# Patient Record
Sex: MALE | Race: WHITE | NOT HISPANIC OR LATINO | URBAN - METROPOLITAN AREA
[De-identification: names, ages, dates, MRNs, and addresses within clinical notes are randomized per-mention and may not be internally consistent; named-entity substitution may affect disease eponyms.]

---

## 2023-03-06 ENCOUNTER — DOCUMENTATION (OUTPATIENT)
Dept: PRIMARY CARE | Facility: CLINIC | Age: 78
End: 2023-03-06

## 2023-03-06 DIAGNOSIS — J01.01 ACUTE RECURRENT MAXILLARY SINUSITIS: Primary | ICD-10-CM

## 2023-03-06 RX ORDER — AZITHROMYCIN 250 MG/1
TABLET, FILM COATED ORAL
Qty: 6 TABLET | Refills: 0 | OUTPATIENT
Start: 2023-03-06 | End: 2023-03-11

## 2023-03-06 NOTE — PROGRESS NOTES
Subjective   Reason for Visit: Travis Hunt is an 77 y.o. male here for a Medicare Wellness visit.               HPI                        Patient Care Team:  Yunior Nava DO as PCP - General     Review of Systems    Objective   Vitals:  There were no vitals taken for this visit.      Physical Exam    Assessment/Plan   Problem List Items Addressed This Visit    None

## 2023-04-03 DIAGNOSIS — R21 RASH: ICD-10-CM

## 2023-04-03 DIAGNOSIS — L29.9 ITCHING: ICD-10-CM

## 2023-04-04 DIAGNOSIS — L29.9 ITCHING: ICD-10-CM

## 2023-04-04 DIAGNOSIS — R21 RASH: ICD-10-CM

## 2023-04-04 RX ORDER — CLOTRIMAZOLE AND BETAMETHASONE DIPROPIONATE 10; .5 MG/ML; MG/ML
LOTION TOPICAL 2 TIMES DAILY
COMMUNITY
Start: 2015-02-06 | End: 2023-04-04 | Stop reason: SDUPTHER

## 2023-04-04 RX ORDER — FLUCONAZOLE 100 MG/1
100 TABLET ORAL DAILY
Qty: 14 TABLET | Refills: 0 | Status: SHIPPED | OUTPATIENT
Start: 2023-04-04 | End: 2023-04-18

## 2023-04-04 RX ORDER — ASPIRIN 81 MG/1
1 TABLET ORAL DAILY
COMMUNITY
Start: 2022-05-11

## 2023-04-04 RX ORDER — METHYLPREDNISOLONE 4 MG/1
TABLET ORAL
COMMUNITY
Start: 2022-04-29

## 2023-04-04 RX ORDER — CLOTRIMAZOLE AND BETAMETHASONE DIPROPIONATE 10; .5 MG/ML; MG/ML
LOTION TOPICAL EVERY 2 HOUR PRN
Qty: 120 ML | Refills: 3 | Status: SHIPPED | OUTPATIENT
Start: 2023-04-04 | End: 2023-12-19 | Stop reason: SDUPTHER

## 2023-04-04 RX ORDER — TAMSULOSIN HYDROCHLORIDE 0.4 MG/1
1 CAPSULE ORAL DAILY
COMMUNITY
Start: 2023-02-07

## 2023-04-04 RX ORDER — CICLOPIROX 80 MG/ML
SOLUTION TOPICAL
COMMUNITY
Start: 2023-02-16

## 2023-04-04 RX ORDER — METAXALONE 800 MG/1
1 TABLET ORAL DAILY
COMMUNITY
Start: 2018-03-14 | End: 2023-04-17

## 2023-04-04 RX ORDER — ATORVASTATIN CALCIUM 40 MG/1
1 TABLET, FILM COATED ORAL DAILY
COMMUNITY
Start: 2023-01-28

## 2023-04-04 RX ORDER — GABAPENTIN 300 MG/1
1 CAPSULE ORAL
COMMUNITY
Start: 2022-05-20

## 2023-04-04 RX ORDER — NITROGLYCERIN 0.4 MG/1
TABLET SUBLINGUAL
COMMUNITY
Start: 2022-05-16

## 2023-04-10 DIAGNOSIS — F32.1 CURRENT MODERATE EPISODE OF MAJOR DEPRESSIVE DISORDER, UNSPECIFIED WHETHER RECURRENT (MULTI): ICD-10-CM

## 2023-04-10 DIAGNOSIS — F34.1 PERSISTENT DEPRESSIVE DISORDER: ICD-10-CM

## 2023-04-10 DIAGNOSIS — E66.01 CLASS 3 SEVERE OBESITY WITH SERIOUS COMORBIDITY AND BODY MASS INDEX (BMI) OF 40.0 TO 44.9 IN ADULT, UNSPECIFIED OBESITY TYPE (MULTI): Primary | ICD-10-CM

## 2023-04-10 DIAGNOSIS — E66.09 OBESITY DUE TO EXCESS CALORIES WITH SERIOUS COMORBIDITY, UNSPECIFIED CLASSIFICATION: ICD-10-CM

## 2023-04-17 DIAGNOSIS — M62.838 MUSCLE SPASM: Primary | ICD-10-CM

## 2023-04-17 RX ORDER — METAXALONE 800 MG/1
TABLET ORAL
Qty: 90 TABLET | Refills: 3 | Status: SHIPPED | OUTPATIENT
Start: 2023-04-17 | End: 2024-03-15

## 2023-07-27 DIAGNOSIS — Z74.09 LIMITED MOBILITY: ICD-10-CM

## 2023-07-27 DIAGNOSIS — M76.891 TENDINITIS OF RIGHT KNEE: ICD-10-CM

## 2023-07-27 DIAGNOSIS — E66.01 CLASS 3 SEVERE OBESITY DUE TO EXCESS CALORIES WITH SERIOUS COMORBIDITY AND BODY MASS INDEX (BMI) OF 40.0 TO 44.9 IN ADULT (MULTI): ICD-10-CM

## 2023-07-27 DIAGNOSIS — G89.29 CHRONIC THORACIC BACK PAIN, UNSPECIFIED BACK PAIN LATERALITY: ICD-10-CM

## 2023-07-27 DIAGNOSIS — M54.6 CHRONIC THORACIC BACK PAIN, UNSPECIFIED BACK PAIN LATERALITY: ICD-10-CM

## 2023-10-26 DIAGNOSIS — I27.82 CHRONIC PULMONARY EMBOLISM WITHOUT ACUTE COR PULMONALE, UNSPECIFIED PULMONARY EMBOLISM TYPE (MULTI): Primary | ICD-10-CM

## 2023-10-26 RX ORDER — WARFARIN SODIUM 5 MG/1
TABLET ORAL
Qty: 90 TABLET | Refills: 3 | Status: SHIPPED | OUTPATIENT
Start: 2023-10-26

## 2023-12-19 DIAGNOSIS — L29.9 ITCHING: ICD-10-CM

## 2023-12-19 DIAGNOSIS — B37.9 YEAST INFECTION: Primary | ICD-10-CM

## 2023-12-19 DIAGNOSIS — R21 RASH: ICD-10-CM

## 2023-12-19 RX ORDER — CLOTRIMAZOLE AND BETAMETHASONE DIPROPIONATE 10; .5 MG/ML; MG/ML
LOTION TOPICAL EVERY 2 HOUR PRN
Qty: 150 ML | Refills: 3 | Status: SHIPPED | OUTPATIENT
Start: 2023-12-19

## 2023-12-20 ENCOUNTER — TELEPHONE (OUTPATIENT)
Dept: PRIMARY CARE | Facility: CLINIC | Age: 78
End: 2023-12-20

## 2023-12-21 DIAGNOSIS — R42 VERTIGO: Primary | ICD-10-CM

## 2023-12-22 RX ORDER — MECLIZINE HYDROCHLORIDE 25 MG/1
25 TABLET ORAL 3 TIMES DAILY PRN
Qty: 90 TABLET | Refills: 3 | Status: SHIPPED | OUTPATIENT
Start: 2023-12-22

## 2023-12-29 DIAGNOSIS — M10.9 GOUTY ARTHRITIS: Primary | ICD-10-CM

## 2023-12-29 RX ORDER — ALLOPURINOL 100 MG/1
100 TABLET ORAL 2 TIMES DAILY
Qty: 180 TABLET | Refills: 3 | Status: SHIPPED | OUTPATIENT
Start: 2023-12-29

## 2024-01-01 ENCOUNTER — APPOINTMENT (OUTPATIENT)
Dept: RADIOLOGY | Facility: HOSPITAL | Age: 79
End: 2024-01-01
Payer: MEDICARE

## 2024-01-01 ENCOUNTER — APPOINTMENT (OUTPATIENT)
Dept: NEUROLOGY | Facility: HOSPITAL | Age: 79
End: 2024-01-01
Payer: MEDICARE

## 2024-01-01 ENCOUNTER — APPOINTMENT (OUTPATIENT)
Dept: CARDIOLOGY | Facility: HOSPITAL | Age: 79
End: 2024-01-01
Payer: MEDICARE

## 2024-01-01 PROCEDURE — 70450 CT HEAD/BRAIN W/O DYE: CPT

## 2024-01-01 PROCEDURE — 71045 X-RAY EXAM CHEST 1 VIEW: CPT

## 2024-01-01 PROCEDURE — 93005 ELECTROCARDIOGRAM TRACING: CPT

## 2024-01-01 PROCEDURE — 74018 RADEX ABDOMEN 1 VIEW: CPT

## 2024-01-01 PROCEDURE — 70553 MRI BRAIN STEM W/O & W/DYE: CPT

## 2024-01-01 PROCEDURE — 70545 MR ANGIOGRAPHY HEAD W/DYE: CPT

## 2024-01-01 PROCEDURE — 72125 CT NECK SPINE W/O DYE: CPT

## 2024-02-12 DIAGNOSIS — M41.9 SCOLIOSIS OF THORACOLUMBAR SPINE, UNSPECIFIED SCOLIOSIS TYPE: ICD-10-CM

## 2024-02-12 DIAGNOSIS — G89.29 CHRONIC BILATERAL THORACIC BACK PAIN: ICD-10-CM

## 2024-02-12 DIAGNOSIS — E11.42 DIABETIC PERIPHERAL NEUROPATHY ASSOCIATED WITH TYPE 2 DIABETES MELLITUS (MULTI): Primary | ICD-10-CM

## 2024-02-12 DIAGNOSIS — M54.6 CHRONIC BILATERAL THORACIC BACK PAIN: ICD-10-CM

## 2024-03-12 DIAGNOSIS — M62.838 MUSCLE SPASM: ICD-10-CM

## 2024-03-15 RX ORDER — METAXALONE 800 MG/1
TABLET ORAL
Qty: 90 TABLET | Refills: 3 | Status: SHIPPED | OUTPATIENT
Start: 2024-03-15

## 2024-04-17 DIAGNOSIS — M54.17 LUMBOSACRAL RADICULOPATHY DUE TO INTERVERTEBRAL DISC DISORDER: Primary | ICD-10-CM

## 2024-04-17 RX ORDER — LIDOCAINE 50 MG/G
1 PATCH TOPICAL DAILY
Qty: 30 PATCH | Refills: 3 | Status: SHIPPED | OUTPATIENT
Start: 2024-04-17 | End: 2024-04-18

## 2024-04-18 ENCOUNTER — DOCUMENTATION (OUTPATIENT)
Dept: PRIMARY CARE | Facility: CLINIC | Age: 79
End: 2024-04-18

## 2024-04-18 DIAGNOSIS — M54.17 LUMBOSACRAL RADICULOPATHY DUE TO INTERVERTEBRAL DISC DISORDER: ICD-10-CM

## 2024-04-18 RX ORDER — LIDOCAINE 50 MG/G
1 PATCH TOPICAL DAILY
Qty: 30 PATCH | Refills: 3 | Status: SHIPPED | OUTPATIENT
Start: 2024-04-18 | End: 2025-04-18

## 2024-04-18 NOTE — PROGRESS NOTES
Prior authorization for Lidocaine patches submitted through [facility] today at 8:00 AM    Robin Brown MA

## 2024-05-31 ENCOUNTER — DOCUMENTATION (OUTPATIENT)
Dept: PRIMARY CARE | Facility: CLINIC | Age: 79
End: 2024-05-31

## 2024-07-17 DIAGNOSIS — J06.9 VIRAL UPPER RESPIRATORY TRACT INFECTION: ICD-10-CM

## 2024-07-17 RX ORDER — METHYLPREDNISOLONE 4 MG/1
TABLET ORAL
Qty: 21 TABLET | Refills: 0 | Status: SHIPPED | OUTPATIENT
Start: 2024-07-17 | End: 2024-07-24

## 2024-08-23 ENCOUNTER — TELEPHONE (OUTPATIENT)
Dept: PRIMARY CARE | Facility: CLINIC | Age: 79
End: 2024-08-23
Payer: MEDICARE

## 2024-08-23 NOTE — TELEPHONE ENCOUNTER
Dr. Nam Cast- pain man- [facility] [phone]    Asking if you can call him Monday to discuss mutual patient

## 2024-08-24 ENCOUNTER — HOSPITAL ENCOUNTER (INPATIENT)
Facility: HOSPITAL | Age: 79
End: 2024-08-24
Attending: STUDENT IN AN ORGANIZED HEALTH CARE EDUCATION/TRAINING PROGRAM | Admitting: STUDENT IN AN ORGANIZED HEALTH CARE EDUCATION/TRAINING PROGRAM
Payer: MEDICARE

## 2024-08-24 ENCOUNTER — ANESTHESIA EVENT (OUTPATIENT)
Dept: OPERATING ROOM | Facility: HOSPITAL | Age: 79
End: 2024-08-24
Payer: MEDICARE

## 2024-08-24 ENCOUNTER — ANESTHESIA (OUTPATIENT)
Dept: OPERATING ROOM | Facility: HOSPITAL | Age: 79
End: 2024-08-24
Payer: MEDICARE

## 2024-08-24 DIAGNOSIS — J96.00 ACUTE RESPIRATORY FAILURE, UNSPECIFIED WHETHER WITH HYPOXIA OR HYPERCAPNIA: ICD-10-CM

## 2024-08-24 DIAGNOSIS — I60.4: ICD-10-CM

## 2024-08-24 DIAGNOSIS — S06.5XAA SUBDURAL HEMATOMA (MULTI): Primary | ICD-10-CM

## 2024-08-24 PROBLEM — Z86.718 HISTORY OF DVT (DEEP VEIN THROMBOSIS): Status: ACTIVE | Noted: 2022-08-03

## 2024-08-24 PROBLEM — E78.5 HYPERLIPIDEMIA: Status: ACTIVE | Noted: 2022-04-26

## 2024-08-24 PROBLEM — K58.9 IRRITABLE BOWEL SYNDROME: Status: ACTIVE | Noted: 2024-08-24

## 2024-08-24 PROBLEM — Z79.01 ANTICOAGULANT LONG-TERM USE: Status: ACTIVE | Noted: 2024-08-24

## 2024-08-24 PROBLEM — Z86.711 HISTORY OF PULMONARY EMBOLISM: Status: ACTIVE | Noted: 2022-08-03

## 2024-08-24 PROBLEM — I25.10 CORONARY ARTERIOSCLEROSIS: Status: ACTIVE | Noted: 2022-04-26

## 2024-08-24 PROBLEM — Z95.5 STENTED CORONARY ARTERY: Status: ACTIVE | Noted: 2024-08-24

## 2024-08-24 PROBLEM — I82.409 DVT (DEEP VENOUS THROMBOSIS) (MULTI): Status: ACTIVE | Noted: 2024-08-24

## 2024-08-24 PROBLEM — Z85.528 HISTORY OF RENAL CELL CARCINOMA: Status: ACTIVE | Noted: 2024-08-24

## 2024-08-24 PROBLEM — I26.99 PULMONARY EMBOLUS (MULTI): Status: ACTIVE | Noted: 2024-08-24

## 2024-08-24 LAB
ABO GROUP (TYPE) IN BLOOD: NORMAL
ALBUMIN SERPL BCP-MCNC: 3.2 G/DL (ref 3.4–5)
ALP SERPL-CCNC: 54 U/L (ref 33–136)
ALT SERPL W P-5'-P-CCNC: 12 U/L (ref 10–52)
ANION GAP BLDA CALCULATED.4IONS-SCNC: 10 MMO/L (ref 10–25)
ANION GAP BLDV CALCULATED.4IONS-SCNC: 9 MMOL/L (ref 10–25)
ANION GAP SERPL CALC-SCNC: 15 MMOL/L (ref 10–20)
ANION GAP SERPL CALC-SCNC: 15 MMOL/L (ref 10–20)
ANTIBODY SCREEN: NORMAL
ANTIBODY SCREEN: NORMAL
AST SERPL W P-5'-P-CCNC: 32 U/L (ref 9–39)
BASE EXCESS BLDA CALC-SCNC: -6 MMOL/L (ref -2–3)
BASE EXCESS BLDV CALC-SCNC: -3.5 MMOL/L (ref -2–3)
BILIRUB SERPL-MCNC: 0.6 MG/DL (ref 0–1.2)
BLOOD EXPIRATION DATE: NORMAL
BODY TEMPERATURE: 37 DEGREES CELSIUS
BODY TEMPERATURE: 37 DEGREES CELSIUS
BUN SERPL-MCNC: 31 MG/DL (ref 6–23)
BUN SERPL-MCNC: 31 MG/DL (ref 6–23)
CA-I BLDA-SCNC: 1.09 MMOL/L (ref 1.1–1.33)
CA-I BLDV-SCNC: 1.18 MMOL/L (ref 1.1–1.33)
CALCIUM SERPL-MCNC: 8.2 MG/DL (ref 8.6–10.6)
CALCIUM SERPL-MCNC: 8.7 MG/DL (ref 8.6–10.6)
CFT FORM KAOLIN IND BLD RES TEG: 0.8 MIN (ref 0.8–2.1)
CHLORIDE BLDA-SCNC: 114 MMOL/L (ref 98–107)
CHLORIDE BLDV-SCNC: 114 MMOL/L (ref 98–107)
CHLORIDE SERPL-SCNC: 109 MMOL/L (ref 98–107)
CHLORIDE SERPL-SCNC: 111 MMOL/L (ref 98–107)
CLOT ANGLE.KAOLIN INDUCED BLD RES TEG: 79 DEG (ref 63–78)
CLOT INIT KAO IND P HEP NEUT BLD RES TEG: 4.8 MIN (ref 4.3–8.3)
CLOT INIT KAO IND P HEP NEUT BLD RES TEG: 5.1 MIN (ref 4.6–9.1)
CO2 SERPL-SCNC: 20 MMOL/L (ref 21–32)
CO2 SERPL-SCNC: 22 MMOL/L (ref 21–32)
CREAT SERPL-MCNC: 1.59 MG/DL (ref 0.5–1.3)
CREAT SERPL-MCNC: 1.63 MG/DL (ref 0.5–1.3)
DISPENSE STATUS: NORMAL
EGFRCR SERPLBLD CKD-EPI 2021: 43 ML/MIN/1.73M*2
EGFRCR SERPLBLD CKD-EPI 2021: 45 ML/MIN/1.73M*2
ERYTHROCYTE [DISTWIDTH] IN BLOOD BY AUTOMATED COUNT: 18.6 % (ref 11.5–14.5)
ETHANOL SERPL-MCNC: <10 MG/DL
FIBRINOGEN BLD CALC-MCNC: 766 MG/DL (ref 278–581)
GLUCOSE BLDA-MCNC: 173 MG/DL (ref 74–99)
GLUCOSE BLDV-MCNC: 137 MG/DL (ref 74–99)
GLUCOSE SERPL-MCNC: 129 MG/DL (ref 74–99)
GLUCOSE SERPL-MCNC: 141 MG/DL (ref 74–99)
HCO3 BLDA-SCNC: 20.7 MMOL/L (ref 22–26)
HCO3 BLDV-SCNC: 23.1 MMOL/L (ref 22–26)
HCT VFR BLD AUTO: 23.7 % (ref 41–52)
HCT VFR BLD EST: 27 % (ref 41–52)
HCT VFR BLD EST: ABNORMAL %
HGB BLD-MCNC: 7.9 G/DL (ref 13.5–17.5)
HGB BLDA-MCNC: ABNORMAL G/DL
HGB BLDV-MCNC: 9.1 G/DL (ref 13.5–17.5)
INHALED O2 CONCENTRATION: 60 %
LACTATE BLDA-SCNC: 1.3 MMOL/L (ref 0.4–2)
LACTATE BLDV-SCNC: 1.6 MMOL/L (ref 0.4–2)
LACTATE SERPL-SCNC: 1.7 MMOL/L (ref 0.4–2)
MA KAOLIN BLD RES TEG: 68 MM (ref 52–69)
MA KAOLIN+TF BLD RES TEG: 68 MM (ref 52–70)
MA TF IND+IIB-IIIA INH BLD RES TEG: 42 MM (ref 15–32)
MAGNESIUM SERPL-MCNC: 1.81 MG/DL (ref 1.6–2.4)
MCH RBC QN AUTO: 32.6 PG (ref 26–34)
MCHC RBC AUTO-ENTMCNC: 33.3 G/DL (ref 32–36)
MCV RBC AUTO: 98 FL (ref 80–100)
NRBC BLD-RTO: 0 /100 WBCS (ref 0–0)
OXYHGB MFR BLDA: ABNORMAL %
OXYHGB MFR BLDV: 54.6 % (ref 45–75)
PCO2 BLDA: 44 MM HG (ref 38–42)
PCO2 BLDV: 48 MM HG (ref 41–51)
PH BLDA: 7.28 PH (ref 7.38–7.42)
PH BLDV: 7.29 PH (ref 7.33–7.43)
PHOSPHATE SERPL-MCNC: 2.7 MG/DL (ref 2.5–4.9)
PHOSPHATE SERPL-MCNC: 2.7 MG/DL (ref 2.5–4.9)
PLATELET # BLD AUTO: 145 X10*3/UL (ref 150–450)
PO2 BLDA: 188 MM HG (ref 85–95)
PO2 BLDV: 37 MM HG (ref 35–45)
POTASSIUM BLDA-SCNC: 4.5 MMOL/L (ref 3.5–5.3)
POTASSIUM BLDV-SCNC: 5.2 MMOL/L (ref 3.5–5.3)
POTASSIUM SERPL-SCNC: 4.3 MMOL/L (ref 3.5–5.3)
POTASSIUM SERPL-SCNC: 4.8 MMOL/L (ref 3.5–5.3)
PRODUCT BLOOD TYPE: 5100
PRODUCT BLOOD TYPE: 5100
PRODUCT BLOOD TYPE: 8400
PRODUCT CODE: NORMAL
PROT SERPL-MCNC: 6.2 G/DL (ref 6.4–8.2)
RBC # BLD AUTO: 2.42 X10*6/UL (ref 4.5–5.9)
RH FACTOR (ANTIGEN D): NORMAL
SAO2 % BLDA: ABNORMAL %
SAO2 % BLDV: 56 % (ref 45–75)
SODIUM BLDA-SCNC: 140 MMOL/L (ref 136–145)
SODIUM BLDV-SCNC: 141 MMOL/L (ref 136–145)
SODIUM SERPL-SCNC: 141 MMOL/L (ref 136–145)
SODIUM SERPL-SCNC: 142 MMOL/L (ref 136–145)
UNIT ABO: NORMAL
UNIT NUMBER: NORMAL
UNIT RH: NORMAL
UNIT VOLUME: 277
UNIT VOLUME: 281
UNIT VOLUME: 283
WBC # BLD AUTO: 11.7 X10*3/UL (ref 4.4–11.3)

## 2024-08-24 PROCEDURE — 86901 BLOOD TYPING SEROLOGIC RH(D): CPT | Performed by: STUDENT IN AN ORGANIZED HEALTH CARE EDUCATION/TRAINING PROGRAM

## 2024-08-24 PROCEDURE — 99291 CRITICAL CARE FIRST HOUR: CPT | Performed by: EMERGENCY MEDICINE

## 2024-08-24 PROCEDURE — 86900 BLOOD TYPING SEROLOGIC ABO: CPT

## 2024-08-24 PROCEDURE — 85027 COMPLETE CBC AUTOMATED: CPT

## 2024-08-24 PROCEDURE — 84132 ASSAY OF SERUM POTASSIUM: CPT

## 2024-08-24 PROCEDURE — 2720000007 HC OR 272 NO HCPCS: Performed by: NEUROLOGICAL SURGERY

## 2024-08-24 PROCEDURE — 82077 ASSAY SPEC XCP UR&BREATH IA: CPT | Performed by: NURSE PRACTITIONER

## 2024-08-24 PROCEDURE — 80053 COMPREHEN METABOLIC PANEL: CPT | Performed by: NURSE PRACTITIONER

## 2024-08-24 PROCEDURE — 61312 CRNEC/CRNOT STTL XDRL/SDRL: CPT | Performed by: NEUROLOGICAL SURGERY

## 2024-08-24 PROCEDURE — 36600 WITHDRAWAL OF ARTERIAL BLOOD: CPT | Performed by: NURSE PRACTITIONER

## 2024-08-24 PROCEDURE — P9037 PLATE PHERES LEUKOREDU IRRAD: HCPCS

## 2024-08-24 PROCEDURE — C1713 ANCHOR/SCREW BN/BN,TIS/BN: HCPCS | Performed by: NEUROLOGICAL SURGERY

## 2024-08-24 PROCEDURE — 99291 CRITICAL CARE FIRST HOUR: CPT | Performed by: STUDENT IN AN ORGANIZED HEALTH CARE EDUCATION/TRAINING PROGRAM

## 2024-08-24 PROCEDURE — 3600000009 HC OR TIME - EACH INCREMENTAL 1 MINUTE - PROCEDURE LEVEL FOUR: Performed by: NEUROLOGICAL SURGERY

## 2024-08-24 PROCEDURE — C1757 CATH, THROMBECTOMY/EMBOLECT: HCPCS | Performed by: NEUROLOGICAL SURGERY

## 2024-08-24 PROCEDURE — 3700000002 HC GENERAL ANESTHESIA TIME - EACH INCREMENTAL 1 MINUTE: Performed by: NEUROLOGICAL SURGERY

## 2024-08-24 PROCEDURE — G0390 TRAUMA RESPONS W/HOSP CRITI: HCPCS

## 2024-08-24 PROCEDURE — 83735 ASSAY OF MAGNESIUM: CPT

## 2024-08-24 PROCEDURE — 86923 COMPATIBILITY TEST ELECTRIC: CPT

## 2024-08-24 PROCEDURE — 94799 UNLISTED PULMONARY SVC/PX: CPT

## 2024-08-24 PROCEDURE — 71045 X-RAY EXAM CHEST 1 VIEW: CPT | Performed by: RADIOLOGY

## 2024-08-24 PROCEDURE — C1763 CONN TISS, NON-HUMAN: HCPCS | Performed by: NEUROLOGICAL SURGERY

## 2024-08-24 PROCEDURE — 2780000003 HC OR 278 NO HCPCS: Performed by: NEUROLOGICAL SURGERY

## 2024-08-24 PROCEDURE — 2500000005 HC RX 250 GENERAL PHARMACY W/O HCPCS: Performed by: NEUROLOGICAL SURGERY

## 2024-08-24 PROCEDURE — 86901 BLOOD TYPING SEROLOGIC RH(D): CPT

## 2024-08-24 PROCEDURE — 2500000001 HC RX 250 WO HCPCS SELF ADMINISTERED DRUGS (ALT 637 FOR MEDICARE OP): Performed by: NEUROLOGICAL SURGERY

## 2024-08-24 PROCEDURE — 3600000004 HC OR TIME - INITIAL BASE CHARGE - PROCEDURE LEVEL FOUR: Performed by: NEUROLOGICAL SURGERY

## 2024-08-24 PROCEDURE — 85384 FIBRINOGEN ACTIVITY: CPT

## 2024-08-24 PROCEDURE — 94002 VENT MGMT INPAT INIT DAY: CPT

## 2024-08-24 PROCEDURE — 72125 CT NECK SPINE W/O DYE: CPT | Performed by: RADIOLOGY

## 2024-08-24 PROCEDURE — 84100 ASSAY OF PHOSPHORUS: CPT

## 2024-08-24 PROCEDURE — 70450 CT HEAD/BRAIN W/O DYE: CPT | Performed by: RADIOLOGY

## 2024-08-24 PROCEDURE — 36430 TRANSFUSION BLD/BLD COMPNT: CPT | Mod: GC

## 2024-08-24 PROCEDURE — 00C40ZZ EXTIRPATION OF MATTER FROM INTRACRANIAL SUBDURAL SPACE, OPEN APPROACH: ICD-10-PCS | Performed by: NEUROLOGICAL SURGERY

## 2024-08-24 PROCEDURE — 3700000001 HC GENERAL ANESTHESIA TIME - INITIAL BASE CHARGE: Performed by: NEUROLOGICAL SURGERY

## 2024-08-24 PROCEDURE — 99222 1ST HOSP IP/OBS MODERATE 55: CPT

## 2024-08-24 PROCEDURE — 36600 WITHDRAWAL OF ARTERIAL BLOOD: CPT

## 2024-08-24 PROCEDURE — 2020000001 HC ICU ROOM DAILY

## 2024-08-24 PROCEDURE — 82374 ASSAY BLOOD CARBON DIOXIDE: CPT

## 2024-08-24 PROCEDURE — 5A1955Z RESPIRATORY VENTILATION, GREATER THAN 96 CONSECUTIVE HOURS: ICD-10-PCS | Performed by: STUDENT IN AN ORGANIZED HEALTH CARE EDUCATION/TRAINING PROGRAM

## 2024-08-24 PROCEDURE — 2500000004 HC RX 250 GENERAL PHARMACY W/ HCPCS (ALT 636 FOR OP/ED)

## 2024-08-24 PROCEDURE — 36415 COLL VENOUS BLD VENIPUNCTURE: CPT | Performed by: STUDENT IN AN ORGANIZED HEALTH CARE EDUCATION/TRAINING PROGRAM

## 2024-08-24 PROCEDURE — 2500000005 HC RX 250 GENERAL PHARMACY W/O HCPCS

## 2024-08-24 PROCEDURE — 36415 COLL VENOUS BLD VENIPUNCTURE: CPT | Performed by: NURSE PRACTITIONER

## 2024-08-24 PROCEDURE — 83605 ASSAY OF LACTIC ACID: CPT | Performed by: NURSE PRACTITIONER

## 2024-08-24 DEVICE — IMPLANTABLE DEVICE: Type: IMPLANTABLE DEVICE | Site: SKULL | Status: FUNCTIONAL

## 2024-08-24 DEVICE — CROSS PIN, AXS SELF-TAP, 1.5 X 4MM: Type: IMPLANTABLE DEVICE | Site: SKULL | Status: FUNCTIONAL

## 2024-08-24 DEVICE — DURAGEN® PLUS DURAL REGENERATION MATRIX , 4 IN X 5 IN
Type: IMPLANTABLE DEVICE | Site: BRAIN | Status: FUNCTIONAL
Brand: DURAGEN® PLUS

## 2024-08-24 RX ORDER — CALCIUM GLUCONATE 20 MG/ML
2 INJECTION, SOLUTION INTRAVENOUS EVERY 6 HOURS PRN
Status: DISCONTINUED | OUTPATIENT
Start: 2024-08-24 | End: 2024-08-25

## 2024-08-24 RX ORDER — CLOPIDOGREL BISULFATE 75 MG/1
75 TABLET ORAL EVERY EVENING
COMMUNITY

## 2024-08-24 RX ORDER — MAGNESIUM SULFATE HEPTAHYDRATE 40 MG/ML
4 INJECTION, SOLUTION INTRAVENOUS EVERY 6 HOURS PRN
Status: DISCONTINUED | OUTPATIENT
Start: 2024-08-24 | End: 2024-08-25

## 2024-08-24 RX ORDER — LIDOCAINE HYDROCHLORIDE AND EPINEPHRINE 5; 5 MG/ML; UG/ML
INJECTION, SOLUTION INFILTRATION; PERINEURAL AS NEEDED
Status: DISCONTINUED | OUTPATIENT
Start: 2024-08-24 | End: 2024-08-25 | Stop reason: HOSPADM

## 2024-08-24 RX ORDER — MAGNESIUM SULFATE HEPTAHYDRATE 40 MG/ML
2 INJECTION, SOLUTION INTRAVENOUS EVERY 6 HOURS PRN
Status: DISCONTINUED | OUTPATIENT
Start: 2024-08-24 | End: 2024-08-25

## 2024-08-24 RX ORDER — POLYMYXIN B 500000 [USP'U]/1
INJECTION, POWDER, LYOPHILIZED, FOR SOLUTION INTRAMUSCULAR; INTRATHECAL; INTRAVENOUS; OPHTHALMIC AS NEEDED
Status: DISCONTINUED | OUTPATIENT
Start: 2024-08-24 | End: 2024-08-25 | Stop reason: HOSPADM

## 2024-08-24 RX ORDER — ROCURONIUM BROMIDE 10 MG/ML
INJECTION, SOLUTION INTRAVENOUS AS NEEDED
Status: DISCONTINUED | OUTPATIENT
Start: 2024-08-24 | End: 2024-08-25

## 2024-08-24 RX ORDER — BISACODYL 5 MG
10 TABLET, DELAYED RELEASE (ENTERIC COATED) ORAL DAILY PRN
Status: DISCONTINUED | OUTPATIENT
Start: 2024-08-24 | End: 2024-08-25

## 2024-08-24 RX ORDER — MINERAL OIL
180 ENEMA (ML) RECTAL EVERY EVENING
COMMUNITY

## 2024-08-24 RX ORDER — CALCIUM GLUCONATE 20 MG/ML
1 INJECTION, SOLUTION INTRAVENOUS EVERY 6 HOURS PRN
Status: DISCONTINUED | OUTPATIENT
Start: 2024-08-24 | End: 2024-08-25

## 2024-08-24 RX ORDER — SODIUM CHLORIDE 9 MG/ML
75 INJECTION, SOLUTION INTRAVENOUS CONTINUOUS
Status: DISCONTINUED | OUTPATIENT
Start: 2024-08-24 | End: 2024-08-27

## 2024-08-24 RX ORDER — POTASSIUM CHLORIDE 14.9 MG/ML
20 INJECTION INTRAVENOUS EVERY 6 HOURS PRN
Status: DISCONTINUED | OUTPATIENT
Start: 2024-08-24 | End: 2024-08-25

## 2024-08-24 RX ORDER — PHENYLEPHRINE HYDROCHLORIDE 10 MG/ML
INJECTION INTRAVENOUS AS NEEDED
Status: DISCONTINUED | OUTPATIENT
Start: 2024-08-24 | End: 2024-08-25

## 2024-08-24 RX ORDER — OXYCODONE AND ACETAMINOPHEN 7.5; 325 MG/1; MG/1
1 TABLET ORAL EVERY 8 HOURS PRN
COMMUNITY

## 2024-08-24 RX ORDER — BACITRACIN 500 [USP'U]/G
OINTMENT TOPICAL AS NEEDED
Status: DISCONTINUED | OUTPATIENT
Start: 2024-08-24 | End: 2024-08-25 | Stop reason: HOSPADM

## 2024-08-24 RX ORDER — CEFAZOLIN 1 G/1
INJECTION, POWDER, FOR SOLUTION INTRAVENOUS AS NEEDED
Status: DISCONTINUED | OUTPATIENT
Start: 2024-08-24 | End: 2024-08-25

## 2024-08-24 RX ORDER — FAMOTIDINE 20 MG/1
20 TABLET, FILM COATED ORAL EVERY EVENING
COMMUNITY

## 2024-08-24 RX ORDER — AMMONIUM LACTATE 12 G/100G
1 LOTION TOPICAL DAILY PRN
COMMUNITY

## 2024-08-24 RX ORDER — SODIUM CHLORIDE 0.9 G/100ML
IRRIGANT IRRIGATION AS NEEDED
Status: DISCONTINUED | OUTPATIENT
Start: 2024-08-24 | End: 2024-08-25 | Stop reason: HOSPADM

## 2024-08-24 RX ORDER — SYRING-NEEDL,DISP,INSUL,0.3 ML 29 G X1/2"
297 SYRINGE, EMPTY DISPOSABLE MISCELLANEOUS ONCE AS NEEDED
Status: DISCONTINUED | OUTPATIENT
Start: 2024-08-24 | End: 2024-08-25

## 2024-08-24 RX ADMIN — PHENYLEPHRINE HYDROCHLORIDE 120 MCG: 10 INJECTION INTRAVENOUS at 23:23

## 2024-08-24 RX ADMIN — ROCURONIUM BROMIDE 20 MG: 10 INJECTION INTRAVENOUS at 23:17

## 2024-08-24 RX ADMIN — ROCURONIUM BROMIDE 30 MG: 10 INJECTION INTRAVENOUS at 22:33

## 2024-08-24 RX ADMIN — NOREPINEPHRINE BITARTRATE 8 MCG: 1 INJECTION, SOLUTION, CONCENTRATE INTRAVENOUS at 23:28

## 2024-08-24 RX ADMIN — DESMOPRESSIN ACETATE 58 MCG: 4 INJECTION, SOLUTION INTRAVENOUS; SUBCUTANEOUS at 22:10

## 2024-08-24 RX ADMIN — PHENYLEPHRINE HYDROCHLORIDE 120 MCG: 10 INJECTION INTRAVENOUS at 23:10

## 2024-08-24 RX ADMIN — NOREPINEPHRINE BITARTRATE 8 MCG: 1 INJECTION, SOLUTION, CONCENTRATE INTRAVENOUS at 23:27

## 2024-08-24 RX ADMIN — CEFAZOLIN 3 G: 1 INJECTION, POWDER, FOR SOLUTION INTRAMUSCULAR; INTRAVENOUS at 22:51

## 2024-08-24 RX ADMIN — ROCURONIUM BROMIDE 20 MG: 10 INJECTION INTRAVENOUS at 23:01

## 2024-08-24 RX ADMIN — SODIUM CHLORIDE, SODIUM LACTATE, POTASSIUM CHLORIDE, AND CALCIUM CHLORIDE: 600; 310; 30; 20 INJECTION, SOLUTION INTRAVENOUS at 22:19

## 2024-08-24 RX ADMIN — PHENYLEPHRINE HYDROCHLORIDE 240 MCG: 10 INJECTION INTRAVENOUS at 23:26

## 2024-08-24 ASSESSMENT — ENCOUNTER SYMPTOMS
SEIZURES: 0
EYE DISCHARGE: 0
CHOKING: 0
ABDOMINAL DISTENTION: 1
WHEEZING: 0
FEVER: 0
TREMORS: 0
JOINT SWELLING: 1

## 2024-08-25 ENCOUNTER — APPOINTMENT (OUTPATIENT)
Dept: RADIOLOGY | Facility: HOSPITAL | Age: 79
End: 2024-08-25
Payer: MEDICARE

## 2024-08-25 LAB
ALBUMIN SERPL BCP-MCNC: 2.8 G/DL (ref 3.4–5)
ALBUMIN SERPL BCP-MCNC: 2.9 G/DL (ref 3.4–5)
ANION GAP BLDA CALCULATED.4IONS-SCNC: 10 MMO/L (ref 10–25)
ANION GAP BLDA CALCULATED.4IONS-SCNC: 12 MMO/L (ref 10–25)
ANION GAP SERPL CALC-SCNC: 13 MMOL/L (ref 10–20)
ANION GAP SERPL CALC-SCNC: 15 MMOL/L (ref 10–20)
BASE EXCESS BLDA CALC-SCNC: -4.2 MMOL/L (ref -2–3)
BASE EXCESS BLDA CALC-SCNC: -5.7 MMOL/L (ref -2–3)
BASE EXCESS BLDA CALC-SCNC: -6.2 MMOL/L (ref -2–3)
BLOOD EXPIRATION DATE: NORMAL
BLOOD EXPIRATION DATE: NORMAL
BODY TEMPERATURE: 37 DEGREES CELSIUS
BUN SERPL-MCNC: 24 MG/DL (ref 6–23)
BUN SERPL-MCNC: 29 MG/DL (ref 6–23)
CA-I BLDA-SCNC: 1.18 MMOL/L (ref 1.1–1.33)
CA-I BLDA-SCNC: 1.19 MMOL/L (ref 1.1–1.33)
CALCIUM SERPL-MCNC: 7.8 MG/DL (ref 8.6–10.6)
CALCIUM SERPL-MCNC: 7.9 MG/DL (ref 8.6–10.6)
CFT FORM KAOLIN IND BLD RES TEG: 0.8 MIN (ref 0.8–2.1)
CHLORIDE BLDA-SCNC: 111 MMOL/L (ref 98–107)
CHLORIDE BLDA-SCNC: 112 MMOL/L (ref 98–107)
CHLORIDE SERPL-SCNC: 110 MMOL/L (ref 98–107)
CHLORIDE SERPL-SCNC: 112 MMOL/L (ref 98–107)
CLOT ANGLE.KAOLIN INDUCED BLD RES TEG: 79.6 DEG (ref 63–78)
CLOT INIT KAO IND P HEP NEUT BLD RES TEG: 4.8 MIN (ref 4.6–9.1)
CLOT INIT KAO IND P HEP NEUT BLD RES TEG: 4.9 MIN (ref 4.3–8.3)
CO2 SERPL-SCNC: 21 MMOL/L (ref 21–32)
CO2 SERPL-SCNC: 22 MMOL/L (ref 21–32)
CREAT SERPL-MCNC: 1.28 MG/DL (ref 0.5–1.3)
CREAT SERPL-MCNC: 1.43 MG/DL (ref 0.5–1.3)
DISPENSE STATUS: NORMAL
DISPENSE STATUS: NORMAL
EGFRCR SERPLBLD CKD-EPI 2021: 51 ML/MIN/1.73M*2
EGFRCR SERPLBLD CKD-EPI 2021: 58 ML/MIN/1.73M*2
ERYTHROCYTE [DISTWIDTH] IN BLOOD BY AUTOMATED COUNT: 15.9 % (ref 11.5–14.5)
ERYTHROCYTE [DISTWIDTH] IN BLOOD BY AUTOMATED COUNT: 16.4 % (ref 11.5–14.5)
ERYTHROCYTE [DISTWIDTH] IN BLOOD BY AUTOMATED COUNT: 16.7 % (ref 11.5–14.5)
FIBRINOGEN BLD CALC-MCNC: 776 MG/DL (ref 278–581)
GLUCOSE BLD MANUAL STRIP-MCNC: 128 MG/DL (ref 74–99)
GLUCOSE BLD MANUAL STRIP-MCNC: 130 MG/DL (ref 74–99)
GLUCOSE BLD MANUAL STRIP-MCNC: 136 MG/DL (ref 74–99)
GLUCOSE BLD MANUAL STRIP-MCNC: 140 MG/DL (ref 74–99)
GLUCOSE BLD MANUAL STRIP-MCNC: 147 MG/DL (ref 74–99)
GLUCOSE BLDA-MCNC: 152 MG/DL (ref 74–99)
GLUCOSE BLDA-MCNC: 169 MG/DL (ref 74–99)
GLUCOSE SERPL-MCNC: 148 MG/DL (ref 74–99)
GLUCOSE SERPL-MCNC: 177 MG/DL (ref 74–99)
HCO3 BLDA-SCNC: 20.1 MMOL/L (ref 22–26)
HCO3 BLDA-SCNC: 20.7 MMOL/L (ref 22–26)
HCO3 BLDA-SCNC: 21.7 MMOL/L (ref 22–26)
HCT VFR BLD AUTO: 21.9 % (ref 41–52)
HCT VFR BLD AUTO: 23.9 % (ref 41–52)
HCT VFR BLD AUTO: 24.3 % (ref 41–52)
HCT VFR BLD EST: 20 % (ref 41–52)
HCT VFR BLD EST: 21 % (ref 41–52)
HGB BLD-MCNC: 6.7 G/DL (ref 13.5–17.5)
HGB BLD-MCNC: 7.2 G/DL (ref 13.5–17.5)
HGB BLD-MCNC: 7.7 G/DL (ref 13.5–17.5)
HGB BLDA-MCNC: 6.7 G/DL (ref 13.5–17.5)
HGB BLDA-MCNC: 6.9 G/DL (ref 13.5–17.5)
INHALED O2 CONCENTRATION: 40 %
INHALED O2 CONCENTRATION: 40 %
INHALED O2 CONCENTRATION: 60 %
LACTATE BLDA-SCNC: 0.9 MMOL/L (ref 0.4–2)
LACTATE BLDA-SCNC: 1.2 MMOL/L (ref 0.4–2)
MA KAOLIN BLD RES TEG: 70.4 MM (ref 52–69)
MA KAOLIN+TF BLD RES TEG: 70 MM (ref 52–70)
MA TF IND+IIB-IIIA INH BLD RES TEG: 42.5 MM (ref 15–32)
MAGNESIUM SERPL-MCNC: 1.57 MG/DL (ref 1.6–2.4)
MAGNESIUM SERPL-MCNC: 2.32 MG/DL (ref 1.6–2.4)
MCH RBC QN AUTO: 28.6 PG (ref 26–34)
MCH RBC QN AUTO: 28.8 PG (ref 26–34)
MCH RBC QN AUTO: 29.5 PG (ref 26–34)
MCHC RBC AUTO-ENTMCNC: 30.1 G/DL (ref 32–36)
MCHC RBC AUTO-ENTMCNC: 30.6 G/DL (ref 32–36)
MCHC RBC AUTO-ENTMCNC: 31.7 G/DL (ref 32–36)
MCV RBC AUTO: 93 FL (ref 80–100)
MCV RBC AUTO: 94 FL (ref 80–100)
MCV RBC AUTO: 95 FL (ref 80–100)
NRBC BLD-RTO: 0 /100 WBCS (ref 0–0)
NRBC BLD-RTO: 0.1 /100 WBCS (ref 0–0)
NRBC BLD-RTO: 0.1 /100 WBCS (ref 0–0)
OXYHGB MFR BLDA: 95.7 % (ref 94–98)
OXYHGB MFR BLDA: 96.3 % (ref 94–98)
OXYHGB MFR BLDA: 96.8 % (ref 94–98)
PCO2 BLDA: 40 MM HG (ref 38–42)
PCO2 BLDA: 43 MM HG (ref 38–42)
PCO2 BLDA: 45 MM HG (ref 38–42)
PH BLDA: 7.27 PH (ref 7.38–7.42)
PH BLDA: 7.31 PH (ref 7.38–7.42)
PH BLDA: 7.31 PH (ref 7.38–7.42)
PHOSPHATE SERPL-MCNC: 2.9 MG/DL (ref 2.5–4.9)
PHOSPHATE SERPL-MCNC: 3.3 MG/DL (ref 2.5–4.9)
PLATELET # BLD AUTO: 167 X10*3/UL (ref 150–450)
PLATELET # BLD AUTO: 191 X10*3/UL (ref 150–450)
PLATELET # BLD AUTO: 203 X10*3/UL (ref 150–450)
PO2 BLDA: 118 MM HG (ref 85–95)
PO2 BLDA: 132 MM HG (ref 85–95)
PO2 BLDA: 177 MM HG (ref 85–95)
POTASSIUM BLDA-SCNC: 4.3 MMOL/L (ref 3.5–5.3)
POTASSIUM BLDA-SCNC: 5 MMOL/L (ref 3.5–5.3)
POTASSIUM SERPL-SCNC: 4.8 MMOL/L (ref 3.5–5.3)
POTASSIUM SERPL-SCNC: 4.9 MMOL/L (ref 3.5–5.3)
PRODUCT BLOOD TYPE: 6200
PRODUCT BLOOD TYPE: 6200
PRODUCT CODE: NORMAL
PRODUCT CODE: NORMAL
RBC # BLD AUTO: 2.33 X10*6/UL (ref 4.5–5.9)
RBC # BLD AUTO: 2.52 X10*6/UL (ref 4.5–5.9)
RBC # BLD AUTO: 2.61 X10*6/UL (ref 4.5–5.9)
SAO2 % BLDA: 97 % (ref 94–100)
SAO2 % BLDA: 99 % (ref 94–100)
SAO2 % BLDA: 99 % (ref 94–100)
SODIUM BLDA-SCNC: 138 MMOL/L (ref 136–145)
SODIUM BLDA-SCNC: 140 MMOL/L (ref 136–145)
SODIUM SERPL-SCNC: 140 MMOL/L (ref 136–145)
SODIUM SERPL-SCNC: 143 MMOL/L (ref 136–145)
UNIT ABO: NORMAL
UNIT ABO: NORMAL
UNIT NUMBER: NORMAL
UNIT NUMBER: NORMAL
UNIT RH: NORMAL
UNIT RH: NORMAL
UNIT VOLUME: 350
UNIT VOLUME: 350
WBC # BLD AUTO: 14.1 X10*3/UL (ref 4.4–11.3)
WBC # BLD AUTO: 16 X10*3/UL (ref 4.4–11.3)
WBC # BLD AUTO: 21.8 X10*3/UL (ref 4.4–11.3)
XM INTEP: NORMAL
XM INTEP: NORMAL

## 2024-08-25 PROCEDURE — 2500000004 HC RX 250 GENERAL PHARMACY W/ HCPCS (ALT 636 FOR OP/ED)

## 2024-08-25 PROCEDURE — 2500000004 HC RX 250 GENERAL PHARMACY W/ HCPCS (ALT 636 FOR OP/ED): Performed by: EMERGENCY MEDICINE

## 2024-08-25 PROCEDURE — 70450 CT HEAD/BRAIN W/O DYE: CPT | Performed by: RADIOLOGY

## 2024-08-25 PROCEDURE — 70450 CT HEAD/BRAIN W/O DYE: CPT | Performed by: STUDENT IN AN ORGANIZED HEALTH CARE EDUCATION/TRAINING PROGRAM

## 2024-08-25 PROCEDURE — 71045 X-RAY EXAM CHEST 1 VIEW: CPT | Performed by: RADIOLOGY

## 2024-08-25 PROCEDURE — 85027 COMPLETE CBC AUTOMATED: CPT

## 2024-08-25 PROCEDURE — 99291 CRITICAL CARE FIRST HOUR: CPT | Performed by: EMERGENCY MEDICINE

## 2024-08-25 PROCEDURE — 84132 ASSAY OF SERUM POTASSIUM: CPT

## 2024-08-25 PROCEDURE — 3E033XZ INTRODUCTION OF VASOPRESSOR INTO PERIPHERAL VEIN, PERCUTANEOUS APPROACH: ICD-10-PCS | Performed by: STUDENT IN AN ORGANIZED HEALTH CARE EDUCATION/TRAINING PROGRAM

## 2024-08-25 PROCEDURE — 71045 X-RAY EXAM CHEST 1 VIEW: CPT

## 2024-08-25 PROCEDURE — P9016 RBC LEUKOCYTES REDUCED: HCPCS

## 2024-08-25 PROCEDURE — 94799 UNLISTED PULMONARY SVC/PX: CPT

## 2024-08-25 PROCEDURE — 2500000005 HC RX 250 GENERAL PHARMACY W/O HCPCS

## 2024-08-25 PROCEDURE — 82947 ASSAY GLUCOSE BLOOD QUANT: CPT

## 2024-08-25 PROCEDURE — 85384 FIBRINOGEN ACTIVITY: CPT

## 2024-08-25 PROCEDURE — 80069 RENAL FUNCTION PANEL: CPT

## 2024-08-25 PROCEDURE — 37799 UNLISTED PX VASCULAR SURGERY: CPT

## 2024-08-25 PROCEDURE — 83880 ASSAY OF NATRIURETIC PEPTIDE: CPT

## 2024-08-25 PROCEDURE — 36430 TRANSFUSION BLD/BLD COMPNT: CPT

## 2024-08-25 PROCEDURE — 82805 BLOOD GASES W/O2 SATURATION: CPT

## 2024-08-25 PROCEDURE — 2020000001 HC ICU ROOM DAILY

## 2024-08-25 PROCEDURE — 83735 ASSAY OF MAGNESIUM: CPT

## 2024-08-25 RX ORDER — LEVETIRACETAM 500 MG/1
500 TABLET ORAL 2 TIMES DAILY
Status: DISCONTINUED | OUTPATIENT
Start: 2024-08-25 | End: 2024-08-25

## 2024-08-25 RX ORDER — FAMOTIDINE 10 MG/ML
20 INJECTION INTRAVENOUS EVERY 12 HOURS SCHEDULED
Status: DISCONTINUED | OUTPATIENT
Start: 2024-08-25 | End: 2024-08-30

## 2024-08-25 RX ORDER — NOREPINEPHRINE BITARTRATE/D5W 8 MG/250ML
.01-.2 PLASTIC BAG, INJECTION (ML) INTRAVENOUS CONTINUOUS
Status: DISCONTINUED | OUTPATIENT
Start: 2024-08-25 | End: 2024-08-27

## 2024-08-25 RX ORDER — PROPOFOL 10 MG/ML
5-50 INJECTION, EMULSION INTRAVENOUS CONTINUOUS
Status: DISCONTINUED | OUTPATIENT
Start: 2024-08-25 | End: 2024-09-02

## 2024-08-25 RX ORDER — INSULIN LISPRO 100 [IU]/ML
0-5 INJECTION, SOLUTION INTRAVENOUS; SUBCUTANEOUS EVERY 4 HOURS
Status: DISCONTINUED | OUTPATIENT
Start: 2024-08-25 | End: 2024-09-06

## 2024-08-25 RX ORDER — ONDANSETRON HYDROCHLORIDE 2 MG/ML
INJECTION, SOLUTION INTRAVENOUS AS NEEDED
Status: DISCONTINUED | OUTPATIENT
Start: 2024-08-25 | End: 2024-08-25

## 2024-08-25 RX ORDER — FENTANYL CITRATE-0.9 % NACL/PF 10 MCG/ML
25-200 PLASTIC BAG, INJECTION (ML) INTRAVENOUS CONTINUOUS
Status: DISCONTINUED | OUTPATIENT
Start: 2024-08-25 | End: 2024-09-09

## 2024-08-25 RX ORDER — PROPOFOL 10 MG/ML
INJECTION, EMULSION INTRAVENOUS CONTINUOUS PRN
Status: DISCONTINUED | OUTPATIENT
Start: 2024-08-25 | End: 2024-08-25

## 2024-08-25 RX ORDER — NOREPINEPHRINE BITARTRATE/D5W 8 MG/250ML
.01-.2 PLASTIC BAG, INJECTION (ML) INTRAVENOUS CONTINUOUS
Status: DISCONTINUED | OUTPATIENT
Start: 2024-08-25 | End: 2024-08-25

## 2024-08-25 RX ORDER — LEVETIRACETAM 5 MG/ML
500 INJECTION INTRAVASCULAR EVERY 12 HOURS
Status: COMPLETED | OUTPATIENT
Start: 2024-08-25 | End: 2024-08-31

## 2024-08-25 RX ADMIN — SUGAMMADEX 200 MG: 100 INJECTION, SOLUTION INTRAVENOUS at 01:05

## 2024-08-25 RX ADMIN — ONDANSETRON 4 MG: 2 INJECTION, SOLUTION INTRAMUSCULAR; INTRAVENOUS at 00:21

## 2024-08-25 RX ADMIN — NOREPINEPHRINE BITARTRATE 8 MCG: 1 INJECTION, SOLUTION, CONCENTRATE INTRAVENOUS at 01:05

## 2024-08-25 RX ADMIN — PROPOFOL 45 MCG/KG/MIN: 10 INJECTION, EMULSION INTRAVENOUS at 00:21

## 2024-08-25 SDOH — SOCIAL STABILITY: SOCIAL INSECURITY
WITHIN THE LAST YEAR, HAVE YOU BEEN KICKED, HIT, SLAPPED, OR OTHERWISE PHYSICALLY HURT BY YOUR PARTNER OR EX-PARTNER?: PATIENT UNABLE TO ANSWER

## 2024-08-25 SDOH — ECONOMIC STABILITY: INCOME INSECURITY
HOW HARD IS IT FOR YOU TO PAY FOR THE VERY BASICS LIKE FOOD, HOUSING, MEDICAL CARE, AND HEATING?: PATIENT UNABLE TO ANSWER

## 2024-08-25 SDOH — SOCIAL STABILITY: SOCIAL NETWORK: HOW OFTEN DO YOU ATTEND CHURCH OR RELIGIOUS SERVICES?: PATIENT UNABLE TO ANSWER

## 2024-08-25 SDOH — SOCIAL STABILITY: SOCIAL INSECURITY: DO YOU FEEL UNSAFE GOING BACK TO THE PLACE WHERE YOU ARE LIVING?: UNABLE TO ASSESS

## 2024-08-25 SDOH — SOCIAL STABILITY: SOCIAL INSECURITY: HAS ANYONE EVER THREATENED TO HURT YOUR FAMILY OR YOUR PETS?: UNABLE TO ASSESS

## 2024-08-25 SDOH — SOCIAL STABILITY: SOCIAL NETWORK
DO YOU BELONG TO ANY CLUBS OR ORGANIZATIONS SUCH AS CHURCH GROUPS UNIONS, FRATERNAL OR ATHLETIC GROUPS, OR SCHOOL GROUPS?: PATIENT UNABLE TO ANSWER

## 2024-08-25 SDOH — SOCIAL STABILITY: SOCIAL NETWORK: ARE YOU MARRIED, WIDOWED, DIVORCED, SEPARATED, NEVER MARRIED, OR LIVING WITH A PARTNER?: PATIENT UNABLE TO ANSWER

## 2024-08-25 SDOH — HEALTH STABILITY: MENTAL HEALTH
HOW OFTEN DO YOU NEED TO HAVE SOMEONE HELP YOU WHEN YOU READ INSTRUCTIONS, PAMPHLETS, OR OTHER WRITTEN MATERIAL FROM YOUR DOCTOR OR PHARMACY?: PATIENT UNABLE TO RESPOND

## 2024-08-25 SDOH — HEALTH STABILITY: MENTAL HEALTH: HOW MANY STANDARD DRINKS CONTAINING ALCOHOL DO YOU HAVE ON A TYPICAL DAY?: PATIENT UNABLE TO ANSWER

## 2024-08-25 SDOH — SOCIAL STABILITY: SOCIAL INSECURITY: HAVE YOU HAD ANY THOUGHTS OF HARMING ANYONE ELSE?: UNABLE TO ASSESS

## 2024-08-25 SDOH — SOCIAL STABILITY: SOCIAL INSECURITY: HAVE YOU HAD THOUGHTS OF HARMING ANYONE ELSE?: NO

## 2024-08-25 SDOH — HEALTH STABILITY: MENTAL HEALTH: HOW OFTEN DO YOU HAVE 6 OR MORE DRINKS ON ONE OCCASION?: PATIENT UNABLE TO ANSWER

## 2024-08-25 SDOH — SOCIAL STABILITY: SOCIAL INSECURITY: WERE YOU ABLE TO COMPLETE ALL THE BEHAVIORAL HEALTH SCREENINGS?: YES

## 2024-08-25 SDOH — ECONOMIC STABILITY: TRANSPORTATION INSECURITY
IN THE PAST 12 MONTHS, HAS LACK OF TRANSPORTATION KEPT YOU FROM MEETINGS, WORK, OR FROM GETTING THINGS NEEDED FOR DAILY LIVING?: PATIENT UNABLE TO ANSWER

## 2024-08-25 SDOH — ECONOMIC STABILITY: FOOD INSECURITY
WITHIN THE PAST 12 MONTHS, YOU WORRIED THAT YOUR FOOD WOULD RUN OUT BEFORE YOU GOT MONEY TO BUY MORE.: PATIENT UNABLE TO ANSWER

## 2024-08-25 SDOH — ECONOMIC STABILITY: HOUSING INSECURITY: AT ANY TIME IN THE PAST 12 MONTHS, WERE YOU HOMELESS OR LIVING IN A SHELTER (INCLUDING NOW)?: PATIENT UNABLE TO ANSWER

## 2024-08-25 SDOH — SOCIAL STABILITY: SOCIAL NETWORK: HOW OFTEN DO YOU ATTENT MEETINGS OF THE CLUB OR ORGANIZATION YOU BELONG TO?: PATIENT UNABLE TO ANSWER

## 2024-08-25 SDOH — HEALTH STABILITY: PHYSICAL HEALTH
ON AVERAGE, HOW MANY DAYS PER WEEK DO YOU ENGAGE IN MODERATE TO STRENUOUS EXERCISE (LIKE A BRISK WALK)?: PATIENT UNABLE TO ANSWER

## 2024-08-25 SDOH — ECONOMIC STABILITY: INCOME INSECURITY
IN THE PAST 12 MONTHS, HAS THE ELECTRIC, GAS, OIL, OR WATER COMPANY THREATENED TO SHUT OFF SERVICE IN YOUR HOME?: PATIENT UNABLE TO ANSWER

## 2024-08-25 SDOH — SOCIAL STABILITY: SOCIAL NETWORK: HOW OFTEN DO YOU GET TOGETHER WITH FRIENDS OR RELATIVES?: PATIENT UNABLE TO ANSWER

## 2024-08-25 SDOH — HEALTH STABILITY: MENTAL HEALTH
STRESS IS WHEN SOMEONE FEELS TENSE, NERVOUS, ANXIOUS, OR CAN'T SLEEP AT NIGHT BECAUSE THEIR MIND IS TROUBLED. HOW STRESSED ARE YOU?: PATIENT UNABLE TO ANSWER

## 2024-08-25 SDOH — SOCIAL STABILITY: SOCIAL INSECURITY: ARE THERE ANY APPARENT SIGNS OF INJURIES/BEHAVIORS THAT COULD BE RELATED TO ABUSE/NEGLECT?: UNABLE TO ASSESS

## 2024-08-25 SDOH — SOCIAL STABILITY: SOCIAL NETWORK: IN A TYPICAL WEEK, HOW MANY TIMES DO YOU TALK ON THE PHONE WITH FAMILY, FRIENDS, OR NEIGHBORS?: PATIENT UNABLE TO ANSWER

## 2024-08-25 SDOH — SOCIAL STABILITY: SOCIAL INSECURITY: ABUSE: ADULT

## 2024-08-25 SDOH — ECONOMIC STABILITY: INCOME INSECURITY
IN THE LAST 12 MONTHS, WAS THERE A TIME WHEN YOU WERE NOT ABLE TO PAY THE MORTGAGE OR RENT ON TIME?: PATIENT UNABLE TO ANSWER

## 2024-08-25 SDOH — SOCIAL STABILITY: SOCIAL INSECURITY
WITHIN THE LAST YEAR, HAVE TO BEEN RAPED OR FORCED TO HAVE ANY KIND OF SEXUAL ACTIVITY BY YOUR PARTNER OR EX-PARTNER?: PATIENT UNABLE TO ANSWER

## 2024-08-25 SDOH — SOCIAL STABILITY: SOCIAL INSECURITY: DOES ANYONE TRY TO KEEP YOU FROM HAVING/CONTACTING OTHER FRIENDS OR DOING THINGS OUTSIDE YOUR HOME?: UNABLE TO ASSESS

## 2024-08-25 SDOH — SOCIAL STABILITY: SOCIAL INSECURITY: WITHIN THE LAST YEAR, HAVE YOU BEEN AFRAID OF YOUR PARTNER OR EX-PARTNER?: PATIENT UNABLE TO ANSWER

## 2024-08-25 SDOH — SOCIAL STABILITY: SOCIAL INSECURITY: ARE YOU OR HAVE YOU BEEN THREATENED OR ABUSED PHYSICALLY, EMOTIONALLY, OR SEXUALLY BY ANYONE?: UNABLE TO ASSESS

## 2024-08-25 SDOH — HEALTH STABILITY: MENTAL HEALTH: HOW OFTEN DO YOU HAVE A DRINK CONTAINING ALCOHOL?: PATIENT UNABLE TO ANSWER

## 2024-08-25 SDOH — SOCIAL STABILITY: SOCIAL INSECURITY
WITHIN THE LAST YEAR, HAVE YOU BEEN HUMILIATED OR EMOTIONALLY ABUSED IN OTHER WAYS BY YOUR PARTNER OR EX-PARTNER?: PATIENT UNABLE TO ANSWER

## 2024-08-25 SDOH — ECONOMIC STABILITY: TRANSPORTATION INSECURITY
IN THE PAST 12 MONTHS, HAS THE LACK OF TRANSPORTATION KEPT YOU FROM MEDICAL APPOINTMENTS OR FROM GETTING MEDICATIONS?: PATIENT UNABLE TO ANSWER

## 2024-08-25 SDOH — ECONOMIC STABILITY: FOOD INSECURITY
WITHIN THE PAST 12 MONTHS, THE FOOD YOU BOUGHT JUST DIDN'T LAST AND YOU DIDN'T HAVE MONEY TO GET MORE.: PATIENT UNABLE TO ANSWER

## 2024-08-25 SDOH — ECONOMIC STABILITY: HOUSING INSECURITY: IN THE PAST 12 MONTHS, HOW MANY TIMES HAVE YOU MOVED WHERE YOU WERE LIVING?: 1

## 2024-08-25 SDOH — HEALTH STABILITY: PHYSICAL HEALTH: ON AVERAGE, HOW MANY MINUTES DO YOU ENGAGE IN EXERCISE AT THIS LEVEL?: PATIENT UNABLE TO ANSWER

## 2024-08-25 SDOH — SOCIAL STABILITY: SOCIAL INSECURITY: DO YOU FEEL ANYONE HAS EXPLOITED OR TAKEN ADVANTAGE OF YOU FINANCIALLY OR OF YOUR PERSONAL PROPERTY?: UNABLE TO ASSESS

## 2024-08-25 ASSESSMENT — ACTIVITIES OF DAILY LIVING (ADL)
GROOMING: UNABLE TO ASSESS
PATIENT'S MEMORY ADEQUATE TO SAFELY COMPLETE DAILY ACTIVITIES?: UNABLE TO ASSESS
JUDGMENT_ADEQUATE_SAFELY_COMPLETE_DAILY_ACTIVITIES: UNABLE TO ASSESS
BATHING: UNABLE TO ASSESS
TOILETING: UNABLE TO ASSESS
WALKS IN HOME: UNABLE TO ASSESS
HEARING - RIGHT EAR: UNABLE TO ASSESS
FEEDING YOURSELF: UNABLE TO ASSESS
DRESSING YOURSELF: UNABLE TO ASSESS
HEARING - LEFT EAR: UNABLE TO ASSESS
ADEQUATE_TO_COMPLETE_ADL: UNABLE TO ASSESS

## 2024-08-25 ASSESSMENT — PATIENT HEALTH QUESTIONNAIRE - PHQ9
SUM OF ALL RESPONSES TO PHQ9 QUESTIONS 1 & 2: 0
2. FEELING DOWN, DEPRESSED OR HOPELESS: NOT AT ALL
1. LITTLE INTEREST OR PLEASURE IN DOING THINGS: NOT AT ALL

## 2024-08-25 ASSESSMENT — PAIN - FUNCTIONAL ASSESSMENT

## 2024-08-25 ASSESSMENT — PAIN SCALES - GENERAL
PAIN_LEVEL: 0
PAINLEVEL_OUTOF10: 0 - NO PAIN

## 2024-08-25 ASSESSMENT — COGNITIVE AND FUNCTIONAL STATUS - GENERAL: PATIENT BASELINE BEDBOUND: UNABLE TO ASSESS AT THIS TIME

## 2024-08-25 ASSESSMENT — LIFESTYLE VARIABLES
AUDIT-C TOTAL SCORE: -1
AUDIT-C TOTAL SCORE: -1
HOW OFTEN DO YOU HAVE A DRINK CONTAINING ALCOHOL: PATIENT UNABLE TO ANSWER
SKIP TO QUESTIONS 9-10: 0
HOW OFTEN DO YOU HAVE 6 OR MORE DRINKS ON ONE OCCASION: PATIENT UNABLE TO ANSWER
AUDIT-C TOTAL SCORE: -1
HOW MANY STANDARD DRINKS CONTAINING ALCOHOL DO YOU HAVE ON A TYPICAL DAY: PATIENT UNABLE TO ANSWER
SKIP TO QUESTIONS 9-10: 0

## 2024-08-25 ASSESSMENT — COLUMBIA-SUICIDE SEVERITY RATING SCALE - C-SSRS
6. HAVE YOU EVER DONE ANYTHING, STARTED TO DO ANYTHING, OR PREPARED TO DO ANYTHING TO END YOUR LIFE?: NO
2. HAVE YOU ACTUALLY HAD ANY THOUGHTS OF KILLING YOURSELF?: NO
1. IN THE PAST MONTH, HAVE YOU WISHED YOU WERE DEAD OR WISHED YOU COULD GO TO SLEEP AND NOT WAKE UP?: NO

## 2024-08-25 NOTE — PROGRESS NOTES
Pharmacy Admission Order Reconciliation Review    Travis Hunt is a 78 y.o. male admitted for Subdural hematoma (Multi). Pharmacy reviewed the patient's unreconciled admission medications.    Prior to admission medications that were reviewed and acted on by the pharmacist include:  Lac-hydrin  Atorvastatin  Plavix  Pepcid  Allegra  Antivert  Omeprazole  Percocet  Warfarin   These medications have been reconciled.     Any other unreconcilied medications have been addressed and will be ordered or held by the patient's medical team. Medications addressed by the pharmacist may be added or changed by the patient's medical team at any time.    Charlene Barajas, PharmD  Transitions of Care Pharmacist  [facility]  Please reach out via Secure Chat for questions

## 2024-08-25 NOTE — PROGRESS NOTES
Physical Therapy                 Therapy Communication Note    Patient Name: Travis Hunt  MRN: [id number]  Today's Date: 8/25/2024     Discipline: Physical Therapy    Missed Visit Reason: Missed Visit Reason:  (Per RN and MD, pt not yet medically ready for therapy services. Plan to hold PT. Att. 0800.)    Missed Time: Attempt   Please schedule follow-up.

## 2024-08-25 NOTE — PROGRESS NOTES
[facility]  TRAUMA ICU - ATTENDING PROGRESS NOTE    I personally saw and evaluated the patient with the resident physician/advanced pactice provider.  I reviewed the case on multidisciplinary rounds this morning.  I reviewed all of the pertinent imaging and laboratory data.  I reviewed the resident/KARTHIKEYAN note.  My independent note with assessment and plan are below::    78-year-old gentleman admitted 8/24/2024 in the setting of traumatic brain injury s/p fall.    I am currently managing this critically ill patient for the following issues:    Traumatic subdural hematoma s/p decompressive craniotomy 8/24  Severe traumatic brain injury  Acute encephalopathy 2/2 TBI  Endotracheally intubated on mechanical ventilation  History of hypertension/hyperlipidemia  History of DVT/PE on warfarin  Acute kidney injury  Acute blood loss anemia  Leukocytosis: Likely reactive in the postoperative period    Daily Plan:  Discussed with trauma team  Neurosurgery recommendations reviewed: Repeat head CT this morning  Keppra through 9/1  Propofol/fentanyl for sedation and pain control: Moving right side, weak on left.  May need EEG/MRI if exam does not improve  Continue mechanical ventilation: SBT/SAT today.  Tolerating pressure support but mental status may preclude safe extubation  Maintain SBP less than 160, PRNs available.  Currently on Levophed due to hypotension, likely sedation related  Check echocardiogram  N.p.o., start tube feeds if not extubated  Urine output/electrolytes acceptable.  Creatinine downtrending.  Maintain euglycemia, sliding scale available  No systemic infectious concerns, monitoring off antibiotics  2 units PRBCs ordered this morning.  Check TEG    DVT Prophylaxis: SCDs  GI Prophylaxis: Pepcid  Diet: NPO  CVC: NO  [place]: Yes  [place]: Yes  Restraints: Yes  Code Status: Full Code  Dispo: ICU, Critically ill     Critical Care Time: 32 min

## 2024-08-25 NOTE — ED PROCEDURE NOTE
Procedure  Critical Care    Performed by: José Miguel Sahu MD  Authorized by: José Miguel Sahu MD    Critical care provider statement:     Critical care time (minutes):  45    Critical care time was exclusive of:  Separately billable procedures and treating other patients and teaching time    Critical care was necessary to treat or prevent imminent or life-threatening deterioration of the following conditions:  Trauma    Critical care was time spent personally by me on the following activities:  Blood draw for specimens, development of treatment plan with patient or surrogate, discussions with consultants, evaluation of patient's response to treatment, examination of patient, ordering and performing treatments and interventions, ordering and review of laboratory studies, ordering and review of radiographic studies, pulse oximetry, re-evaluation of patient's condition and review of old charts    Care discussed with: admitting provider                 José Miguel Sahu MD  08/24/24 5538

## 2024-08-25 NOTE — OP NOTE
RIGHT CRANIOTOMY,SUPRATENTORIAL,EXPLORATORY, SUBDURAL HEMATOMA EVACUATION (R) Operative Note     Date: 2024 - 2024  OR Location: [facility] OR    Name: Travis Hunt YOB: 1945, Age: 78 y.o., MRN: [id number], Sex: male    Diagnosis  Pre-op Diagnosis      * Subdural hematoma (Multi) [S06.5XAA] Post-op Diagnosis     * Subdural hematoma (Multi) [S06.5XAA]     Procedures  RIGHT CRANIOTOMY,SUPRATENTORIAL,EXPLORATORY, SUBDURAL HEMATOMA EVACUATION  01591 - NH CRANIECTOMY/CRANIOTOMY EXPL SUPRATENTORIAL    NH CRANIECTOMY HMTMA SUPRATENTORIAL EXTRA/SUBDURAL [98023]    Right craniotomy for subdural hematoma evacuation    Surgeons      * Zion Dhillon - Primary    Resident/Fellow/Other Assistant:  Surgeons and Role:     * Wilma Miramontes MD - Resident - Assisting     * Lionel Benson MD - Fellow    Procedure Summary  Anesthesia: General  ASA: III  Anesthesia Staff: Anesthesiologist: Alessandra Raymond DO  Anesthesia Resident: Carlos Alberto Boyd MD  Estimated Blood Loss: 250mL  Intra-op Medications:   Administrations occurring from 2215 to 2345 on 24:   Medication Name Total Dose   lidocaine-epinephrine (Xylocaine W/EPI) 0.5 %-1:200,000 injection 20 mL   thrombin (recombinant) (Recothrom) topical solution 10,000 Units   gelatin absorbable (Gelfoam) 100 sponge 1 each   bacitracin ointment 1 Application   polymyxin B injection 1,000,000 Units   sodium chloride 0.9 % irrigation solution 3,000 mL              Anesthesia Record               Intraprocedure I/O Totals          Intake    PLATELETS 560.00 mL    Total Intake 560 mL       Output    Urine 200 mL    Total Output 200 mL       Net    Net Volume 360 mL          Specimen: No specimens collected     Staff:   Circulator: Camilo  Scrub Person: Jo Ann         Drains and/or Catheters:   Closed/Suction Drain Right Scalp Bulb  (Active)       Urethral Catheter Straight-tip 16 Fr. (Active)       Tourniquet Times:         Implants:  Implants       Type Name Action Serial No.       Graft GRAFT MATRIX, DURAL, DURAGEN PLUS 4X5 (1/PK) - [id number] Implanted      Graft GRAFT MATRIX, DURAL, DURAGEN PLUS 4X5 (1/PK) - [id number] Implanted      Screw COVER, LW PROF KORINA HOLE, 14MM W/ - [id number] Implanted      Screw COVER, LW PROF KORINA HOLE, 20MM W/ - [id number] Implanted      Neuro Interventional Implant CROSS PIN, AXS SELF-TAP, 1.5 X 4MM - [id number] Implanted               Findings: thick acute blood under high pressure    Indications: Travis Hunt is an 78 y.o. male who is having surgery for Subdural hematoma (Multi) [S06.5XAA].     The patient was seen in the preoperative area. The risks, benefits, complications, treatment options, non-operative alternatives, expected recovery and outcomes were discussed with the patient. The possibilities of reaction to medication, pulmonary aspiration, injury to surrounding structures, bleeding, recurrent infection, the need for additional procedures, failure to diagnose a condition, and creating a complication requiring transfusion or operation were discussed with the patient. The patient concurred with the proposed plan, giving informed consent.  The site of surgery was properly noted/marked if necessary per policy. The patient has been actively warmed in preoperative area. Preoperative antibiotics have been ordered and given within 1 hours of incision. Venous thrombosis prophylaxis have been ordered including bilateral sequential compression devices    Procedure Details:     Patient was identified in the preop and brought back to the operating room. A safety huddle was performed and patient identifiers, operative site, medications and allergies reviewed. Patient was transferred to the operating table. All pressure points were padded. The patient was already intubated on arrival to the OR.    Patient was placed in supine position with head rotated. Midline was marked. The right side of scalp was cleansed, prepped and draped in usual sterile fashion. A  reverse question tejas incision was planned and marked from the root of zygoma, around  the ear, 5-cm posterior to pinna, superiorly to 1-cm lateral of midline, and anteriorly to just behind hairline. Local anesthetic was used to infiltrate skin over incision.     Incision was opened using #10 blade. Hemostasis was achieved with Eli clips. Skin flap was advanced anteriorly to Santana’s keyhole. Fish hooks were placed under anterior aspect of flap for retraction. Craniectomy flap was marked using bovey electrocautery. High speed acorn drill was used to make 4 burrholes--one along superior aspect of planned craniectomy on coronal suture; one at the posterior aspect of the craniotomy; one at Santana’s keyhole; one at the root of zygoma.     The B1 drillbit with footplate was used to carry out craniectomy from burrhole to burrhole until full craniectomy was complete. Penfield 3 was used to strip dura from bone flap and bone was removed. Dura was largely adherent to the bone flap and came up en bloc with the bone flap. Once good hemostasis was achieved, leksell rongeur was used to bite down bone to floor of middle fossa.     Hemostasis was achieved with combination of thrombin-soaked gel foam, fibrillar, and surgicell. We did have a significant amount of bleeding from the dura along the floor of the middle fossa which was controlled with hemostatics as before.    Then a duragen was placed over the parenchymal surface. The bone was affixed to native skull with Johnson fixation plates and 4mm screws. A 7-flat subgaleal drain was tunneled 1-cm away from incision. Wound was copiously irrigated with antibiotic-impregnated saline, and closure was completed with 2-0 vicryls for galea and staples for skin. Drain was secured with a 2-0 silk suture.    Sterile dressing was applied, and patient was turned over to anesthesia in guarded condition for transport to ICU.      Complications:  None; patient tolerated the procedure well.     Disposition: ICU - intubated and critically ill.  Condition: stable         Additional Details: none    Attending Attestation:     Zion Dhillon  Phone Number: [phone]

## 2024-08-25 NOTE — PROGRESS NOTES
"Travis Hunt is a 78 y.o. male on day 1 of admission presenting with Subdural hematoma (Multi).    Subjective   NAEON       Objective     Physical Exam  Sedated on prop and Fentanyl  Off sedation:   ECNS  RUE flaccid  RLE min withdraws  LUE follows command hand    LLE follows command wiggle toes    Last Recorded Vitals  Blood pressure 150/60, pulse 92, temperature 36.7 °C (98.1 °F), temperature source Temporal, resp. rate 19, height 1.753 m (5' 9.02\"), weight 144 kg (317 lb 15.9 oz), SpO2 100%.  Intake/Output last 3 Shifts:  No intake/output data recorded.    Relevant Results                        Assessment/Plan   Assessment & Plan  Subdural hematoma (Multi)    Stented coronary artery    Pulmonary embolus (Multi)    Irritable bowel syndrome    Anticoagulant long-term use    DVT (deep venous thrombosis) (Multi)    Pt is a 79 yo M w/ h/o HTN, GERD, CAD s/p PCI (on PLX), renal cell carcinoma s/p R nephrectomy, secondary hypoparathyroidism, CKD III, DVT/PE (on Coumadin), p/w syncopal fall    8/24 s/p R crani for SDH evac, CTH w/ improvement of SDH and MLS, incre RF contusion and SDH    Plan:  Trauma primary  Please obtain repeat non-contrast CTH at 9AM  Maintain SGD  Hold PLX/Coumadin   Neurosurgery will continue to follow           Gamaliel Oh MD      "

## 2024-08-25 NOTE — PROGRESS NOTES
[facility]  TRAUMA ICU - PROGRESS NOTE    Patient Name: Travis Hunt  MRN: [id number]  Admit Date: 824  : 1945  AGE: 78 y.o.   GENDER: male  ==============================================================================     MECHANISM OF INJURY:   79 yo male came to [facility] as a transfer from [facility] after a fall backwards from a chair landing on the back of his head. + Headstrike. Unknown LOC. NIH of 4 at , altered and combative, so he was intubated at outside hospital for airway protection. CT Head found large SDH with midline shift and was transferred to [facility] for neurosurgical intervention and ICU admission. 3% NS at OSH. On arrival to [facility], GCS 3. Sedation turned off. Plan for emergent OR for decompressive craniotomy.    LOC (yes/no?): unknown  Anticoagulant / Anti-platelet Rx? (for what dx?): ASA abd Plavix  Referring Facility Name (N/A for scene EMR run): [facility]     INJURIES:   R subdural hematoma with 1.5 cm Right to left shift     OTHER MEDICAL PROBLEMS:  Prior fracture deformity of L transverse process of L5.  R posterior rib fracture of 12th rib unchanged since scan on 22.     INCIDENTAL FINDINGS:  Densities around left kidney  Ventral hernia containing bowel segment.   Solid lung nodule in Right lower lobe increased in size from previous exam on 21 and now 0.7 X 0.6 cm.    PROCEDURES:  Hemicraniotomy     ==============================================================================  TODAY'S ASSESSMENT AND PLAN OF CARE:  Travis Hunt is a 78 y.o. male in the ICU due to: SDH after fall on plavix/coumadin.    NEURO/PAIN/SEDATION:   #Sedation/Pain  - Propofol  - Fentanyl  #Subdural hematoma  - s/p PCC and DDAVP  - OR  with NSGY for decompressive craniotomy  - Keppra BID x 7 days  - SBP <160  - Does NOT need helmet (has bone flap  - DVT ppx 48 hours post op  - Repeat Head CT pending  - Hb>7; INR<1.6; plt >100k; fibrinogen >150 (can  use serial TEGs to inform coagulopathy correction)     RESPIRATORY:   #Intubated and sedated  - Wean vent as able  - Bronchial hygiene    CARDIOVASC:   #CAD, HTN, HLD  - HOLD home meds  #Post-operative hypotension  - Levophed, MAP goal >65    GI:   #NTD     FEN:   #NPO    HEMATOLOGIC:   #Monitor H&H for acute blood loss anemia  - On plavix/coumadin at home  - Reversed with PCC and DDAVP   - Daily coags    ENDOCRINE:   #No hx diabetes or endocrine disorder  - Q4 Glucose checks  - SSI per unit protocol    MUSCULOSKELETAL/SKIN:   #NTD    INFECTIOUS DISEASE:   - Perioperative ancef    GI PROPHYLAXIS: HOLD  DVT PROPHYLAXIS: HOLD pharmacologic DVT ppx, SCD's    DISPOSITION: TICU    ==============================================================================  CHIEF COMPLAINT / OVERNIGHT EVENTS / HPI:   Fall resulting in SDH with midline shift. On plavix/coumaden at home, reversed with pcc and DDAVP. Taken emergently to OR for hemicraniotomy.    MEDICAL HISTORY / ROS:  Admission history and ROS reviewed. Pertinent changes as follows:  None    PHYSICAL EXAM:  Heart Rate:  [82-91]   Resp:  [16-18]   BP: (112-176)/(68-89)   Weight:  [144 kg (318 lb)]   SpO2:  [93 %-100 %]   Physical Exam  Vitals and nursing note reviewed.   Constitutional:       Interventions: He is sedated and intubated.   HENT:      Mouth/Throat:      Comments: Endotracheal tube in place and secured  Cardiovascular:      Rate and Rhythm: Normal rate and regular rhythm.      Pulses: Normal pulses.   Pulmonary:      Effort: No respiratory distress. He is intubated.      Breath sounds: Normal breath sounds.   Abdominal:      Palpations: Abdomen is soft.   Musculoskeletal:      Comments: SCDs in place.   Skin:     General: Skin is warm and dry.         IMAGING SUMMARY:  (summary of new imaging findings, not a copy of dictation)  CT Head/Face: R cerebral subdural hematoma 1.5 cm with right to left midline shift measuring 2 cm  CT C-Spine: Prior fracture  deformity of L transverse process of L5.  CT Chest/Abd/Pelvis: no traumatic abnormality with abdomen or pelvis. Densities around left kidney. Ventral hernia containing bowel segment. No evidence of bowel obstruction. Inflammatory stranding of superficial soft tissues of anterior/inferior abdominopelvic wall.  Solid lung nodule in Right lower lobe increased in size from previous exam on 5/6/21 and now .7X.6cm. R posterior rib fracture of 12th rib unchanged since scan on 11/30/22.  CXR/PXR: diffuse perihilar interstitial prominence with bibasilar opacity. No acute fracture       LABS:          Results from last 7 days   Lab Units 08/24/24 2058   SODIUM mmol/L 141   POTASSIUM mmol/L 4.8   CHLORIDE mmol/L 111*   CO2 mmol/L 20*   BUN mg/dL 31*   CREATININE mg/dL 1.59*   CALCIUM mg/dL 8.2*   PROTEIN TOTAL g/dL 6.2*   BILIRUBIN TOTAL mg/dL 0.6   ALK PHOS U/L 54   ALT U/L 12   AST U/L 32   GLUCOSE mg/dL 129*     Results from last 7 days   Lab Units 08/24/24 2058   BILIRUBIN TOTAL mg/dL 0.6         I have reviewed all medications, laboratory results, and imaging pertinent for today's encounter.

## 2024-08-25 NOTE — SIGNIFICANT EVENT
S/p craniotomy for SDH evacuation    Needs STAT postop CT head without contrast (ordered)  Hb>7; INR<1.6; plt >100k; fibrinogen >150 (can use serial TEGs to inform coagulopathy correction)  Okay for DVT chemoppx on postoperative day 2--hold all other AP/AC until further in patient course, which we will guide  WTE as able  SBP<160  Does NOT need helmet when out of bed---the bone flap was replaced.    Page [id number] for questions

## 2024-08-25 NOTE — PROGRESS NOTES
Pharmacy Medication History Review    Traivs Hunt is a 78 y.o. male admitted for Subdural hematoma (Multi). Pharmacy reviewed the patient's colzq-uo-jtazylluv medications and allergies for accuracy.    The list below reflects the updated PTA list.   Comments regarding how patient may be taking   medications differently can be found   in the Admit Orders Activity  Prior to Admission Medications   Prescriptions  Chart / Spouse Reported?   allopurinol (Zyloprim) 100 mg tablet  Yes   Sig: TAKE 1 TABLET TWICE A DAY  --> Last Fill 6/26/24, #180 / 90 day   ammonium lactate (Lac-Hydrin) 12 % lotion  Yes   Sig: Apply 1 Application topically once daily as needed.  --> Last Fill 8/1/24, 225 g / 30 day   atorvastatin (Lipitor) 40 mg tablet  Yes   Sig: Take 1 tablet (40 mg) by mouth once daily at bedtime.  --> Last Fill 8/4/24, #90 / 90 day   ciclopirox (Penlac) 8 % solution  Yes   Sig: APPLY TO AFFECTED NAILS ONCE DAILY.   REMOVE ONCE WEEKLY WITH ALCOHOL  --> No recent fill history; wife states has not started / has not used.   clopidogrel (Plavix) 75 mg tablet  Yes   Sig: Take 1 tablet (75 mg) by mouth once daily in the evening.  --> Last Fill 7/25/24, #90 / 90 day   clotrimazole-betamethasone (Lotrisone) lotion  Yes   Sig: Apply topically every 2 hours if needed (apply every   2 hours as needed if itching persists).  --> Wife states daily use; Last Fill 7/22/24, #60 / 30 day   fexofenadine (Allegra) 180 mg tablet  Yes   Sig: Take 1 tablet (180 mg) by mouth once daily in the evening.  --> Reported OTC by wife   lidocaine (Lidoderm) 5 % patch  Yes   Sig: Place 1 patch on the skin once daily as needed.   Apply to painful area 12 hours per day, remove for 12 hours.  --> To Knee per wife, Last Fill 5/20/24, #30 / 30 day   meclizine (Antivert) 25 mg tablet  Yes   Sig: Take 1 tablet (25 mg) by mouth once daily at bedtime.  --> Wife states every night at bedtime, Last Fill 3/14/24, #90   metaxalone (Skelaxin) 800 mg tablet  Yes    Sig: Take 1 tablet (800 mg) by mouth once daily as needed.  --> Last Fill 5/31/24, #90 / 90 day; wife states PRN use.   nitroglycerin (Nitrostat) 0.4 mg SL tablet  Yes   Sig: TAKE 1 TABS SUBLINGUAL EVERY 5 MINUTES   AS NEEDED FOR CHEST PAIN  --> No recent fill activity; wife states has but never took.   famotidine (Pepcid) 20 mg tablet  Yes   Sig: Take 1 tablet (20 mg) by mouth once daily in the evening.  --> Reported by wife, no fill history found.      oxyCODONE-acetaminophen (Percocet) 7.5-325 mg tablet  Yes   Sig: Take 1 tablet by mouth every 8 hours if needed.  --> Last Fill 8/2/24, #100 / 30 day.  --> Wife states recent consideration of dose decrease due to potential side effects.   tamsulosin (Flomax) 0.4 mg 24 hr capsule  Yes   Sig: Take 1 capsule (0.4 mg) by mouth once daily in the evening.  --> Last Fill 7/26/24, #90 / 90 day   warfarin (Coumadin) 5 mg tablet  Yes   Sig: TAKE 1 TABLET (5 MG) FIVE DAYS A WEEK AND   TAKE ONE-HALF (1/2) TABLET (2.5 MG) TWO DAYS A WEEK (Mon, Fri).  --> Last Fill 7/22/24, #78 / 90 day  --> Unable to find recent anticoag note; last per [facility] Feb 2024.  --> Wife unsure of current sig; above represents last known regimen.  --> Wife states skipped dose within past 4 days. Last dose possibly yesterday.              The list below reflects the updated allergy list. Please review each documented allergy for additional clarification and justification.  Allergies  Reviewed by Godfrey Arteaga [facility] on 8/24/2024        Severity Reactions Comments    Iodinated Contrast Media Not Specified Hives     Penicillins Not Specified Unknown     Shellfish Containing Products Not Specified Hives           Local Pharmacy if Needed:  [facility]/pharmacy #[id number] - [place], [place] - [address] AT [address] [facility]   P: [phone]     Sources used to complete the med history include:  Pt Interview (in OR, unable to interview).  Interview with Spouse Renu (provides history with med name  prompting)  Epic Dispense Report (Pharmacy Fill History)  Current  Ambulatory Med List / Chart Review / PTA List  CareEverywhere: [facility] (Summary of Episode Notes)  CareEverywhere: OHIP Summarization of Episode Note  [place] (oxycodone/APAP)    Below are additional concerns with the patient's PTA list:  History of aspirin 81 mg daily; previous current encounter notes state pt taking aspirin 81 mg, however, on interview with spouse, she states not currently taking, hasn't for ~1-2 years. Removed from PTA List; follow-up with patient when able to confirm.    Removed from PTA List:   Gabapentin   Medrol Dosepak  Aspirin 81 mg (see comments above)    Added to PTA List:  Clopidogrel  Ammonium Lactate Lotion  Oxycodone / APAP  Allegra  Pepcid    -------------------------------  Godfrey Arteaga, PharmD, [facility]  Transitions of Care Pharmacist  Medication reconciliation complete  Please reach out via [employer] Secure Chat for questions,   or if no response call [employer] or [employer].   Meds Ambulatory and Retail Services

## 2024-08-25 NOTE — PROGRESS NOTES
[facility]  TRAUMA ICU - PROGRESS NOTE    Patient Name: Travis Hunt  MRN: [id number]  Admit Date: 824  : 1945  AGE: 78 y.o.   GENDER: male  ==============================================================================     MECHANISM OF INJURY:   77 yo male came to [facility] as a transfer from [facility] after a fall backwards from a chair landing on the back of his head. + Headstrike. Unknown LOC. NIH of 4 at , altered and combative, so he was intubated at outside hospital for airway protection. CT Head found large SDH with midline shift and was transferred to [facility] for neurosurgical intervention and ICU admission. 3% NS at OSH. On arrival to [facility] campus, GCS 3. Sedation turned off. Plan for emergent OR for decompressive craniotomy.    LOC (yes/no?): unknown  Anticoagulant / Anti-platelet Rx? (for what dx?): ASA abd Plavix  Referring Facility Name (N/A for scene EMR run): [facility]     INJURIES:   R subdural hematoma with 1.5 cm Right to left shift     OTHER MEDICAL PROBLEMS:  Prior fracture deformity of L transverse process of L5.  R posterior rib fracture of 12th rib unchanged since scan on 22.     INCIDENTAL FINDINGS:  Densities around left kidney  Ventral hernia containing bowel segment.   Solid lung nodule in Right lower lobe increased in size from previous exam on 21 and now 0.7 X 0.6 cm.    PROCEDURES:  : Right craniotomy for SDH evacuation    ==============================================================================  TODAY'S ASSESSMENT AND PLAN OF CARE:  Travis Hunt is a 78 y.o. male in the ICU due to: SDH after fall on plavix/coumadin.    NEURO/PAIN/SEDATION:   Severe traumatic brain injury  Subdural hematoma - s/p decompressive craniotomy  - Keppra BID x7 days [-]  - SBP <160  - Does NOT need helmet (has bone flap)  - Repeat CT head pending  Acute pain  - Fentanyl gtt while intubated  - Propofol for sedation    RESPIRATORY:  "  Endotracheally intubated on mechanical ventilation  - SBT/SAT today, extubate as able    CARDIOVASC:   H/o CAD, HLD - s/p coronary stent  H/o HTN  - Holding home atorvastatin while intubated  Hemorrhagic shock - s/p PCC and DDAVP  - Wean Levophed as able  - MAP goal > 65  - TTE to evaluate for heart failure    GI:  H/o GERD  - Continue home famotidine  - Holding home omeprazole     :  DARIUSZ  H/o CKD III in the setting of renal cell carcinoma  H/o BPH  - Holding home tamsulosin  - Evans catheter  - Strict Is & Os    FEN:   - NPO  - NS @ 75 ml/hr pending TTE  - Will place dobhoff for TFs if unable to extubate today    HEMATOLOGIC:   Acute blood loss anemia  - Holding home Plavix and warfarin (reversed with PCC and DDAVP 8/24)  - 2u pRBC this AM, will follow-up with CBC and TEG  - Monitor CBC and TEG  - Hb>7; INR<1.6; plt >100k; fibrinogen >150    ENDOCRINE:   - Q4H POCT glucose + SSI  - Hypoglycemia protocol    MUSCULOSKELETAL/SKIN:   - ICU skin protocol    INFECTIOUS DISEASE:   - Perioperative Ancef    GI PROPHYLAXIS: home famotidine  DVT PROPHYLAXIS: SCDs, holding pharmacologic ppx until 48h post-op [8/27 AM]    DISPOSITION: TICU      This patient was seen and discussed with Dr. Grace.    Zo Benites MD  PGY-1   Trauma ICU  [facility] [id number]  ==============================================================================  CHIEF COMPLAINT / OVERNIGHT EVENTS / HPI:   Patient admitted to ICU overnight following R craniotomy for SDH evacuation in OR with neurosurgery. Patient remains intubated and sedated, plan for second post-op CT head this AM. No nursing concerns.    MEDICAL HISTORY / ROS:  Admission history and ROS reviewed. Pertinent changes as follows: None.    PHYSICAL EXAM:  Heart Rate:  []   Temp:  [35.9 °C (96.6 °F)-36.7 °C (98.1 °F)]   Resp:  [13-27]   BP: (106-176)/(44-89)   Height:  [175.3 cm (5' 9.02\")]   Weight:  [144 kg (317 lb 15.9 oz)-144 kg (318 lb)]   SpO2:  [93 %-100 %]   Physical Exam  Vitals " and nursing note reviewed.   Constitutional:       General: He is not in acute distress.     Interventions: He is sedated and intubated.   HENT:      Head:      Comments: Staples in place reflecting s/p R craniotomy; subgaleal drain in place to bulb suction     Nose: Nose normal.      Mouth/Throat:      Comments: Endotracheal tube in place and secured  Eyes:      Conjunctiva/sclera: Conjunctivae normal.      Pupils: Pupils are equal, round, and reactive to light.   Cardiovascular:      Rate and Rhythm: Normal rate and regular rhythm.      Pulses: Normal pulses.   Pulmonary:      Effort: Pulmonary effort is normal. No respiratory distress. He is intubated.      Comments: Mechanically ventilated on AC VC  Abdominal:      Palpations: Abdomen is soft.   Genitourinary:     Comments: Evans catheter in place.  Musculoskeletal:         General: Swelling present.      Comments: SCDs in place. Significant non-pitting edema to BLE.   Skin:     General: Skin is warm and dry.      Findings: Bruising and lesion present.      Comments: Scattered ecchymoses and bullae on BLE.   Neurological:      Comments: Sedated. Moves RUE and RLE; does not withdraw LUE or LLE from pain.       IMAGING SUMMARY:  8/25 CT head, post-op: decreased SDH and MLS; increased RF contusion/SDH    LABS:  Results from last 7 days   Lab Units 08/25/24  0848 08/25/24  0148 08/24/24  2300   WBC AUTO x10*3/uL 16.0* 21.8* 11.7*   HEMOGLOBIN g/dL 6.7* 7.2* 7.9*   HEMATOCRIT % 21.9* 23.9* 23.7*   PLATELETS AUTO x10*3/uL 191 203 145*         Results from last 7 days   Lab Units 08/25/24  0148 08/24/24  2201 08/24/24  2058   SODIUM mmol/L 143 142 141   POTASSIUM mmol/L 4.8 4.3 4.8   CHLORIDE mmol/L 112* 109* 111*   CO2 mmol/L 21 22 20*   BUN mg/dL 29* 31* 31*   CREATININE mg/dL 1.43* 1.63* 1.59*   CALCIUM mg/dL 7.9* 8.7 8.2*   PROTEIN TOTAL g/dL  --   --  6.2*   BILIRUBIN TOTAL mg/dL  --   --  0.6   ALK PHOS U/L  --   --  54   ALT U/L  --   --  12   AST U/L  --   --   32   GLUCOSE mg/dL 177* 141* 129*     Results from last 7 days   Lab Units 08/24/24  2058   BILIRUBIN TOTAL mg/dL 0.6     Results from last 7 days   Lab Units 08/25/24  0849 08/25/24  0147 08/24/24  2354   POCT PH, ARTERIAL pH 7.31* 7.27* 7.31*   POCT PCO2, ARTERIAL mm Hg 43* 45* 40   POCT PO2, ARTERIAL mm Hg 132* 118* 177*   POCT HCO3 CALCULATED, ARTERIAL mmol/L 21.7* 20.7* 20.1*   POCT BASE EXCESS, ARTERIAL mmol/L -4.2* -6.2* -5.7*     I have reviewed all medications, laboratory results, and imaging pertinent for today's encounter.

## 2024-08-25 NOTE — H&P
[facility]  TRAUMA SERVICE - HISTORY AND PHYSICAL / CONSULT    Patient Name: Travis Hunt  MRN: [id number]  Admit Date: 824  : 1945  AGE: 78 y.o.   GENDER: male  ==============================================================================  MECHANISM OF INJURY / CHIEF COMPLAINT:   77 yo male came to [facility] as a transfer from [facility] after a fall backwards into a chair. + Headstrike. Unknown LOC. NIH of 4, altered and combative, so he was intubated at outside hospital for airway protection. On arrival to [facility], GCS 3. Sedation turned off.   LOC (yes/no?): unknown  Anticoagulant / Anti-platelet Rx? (for what dx?): ASA abd Plavix  Referring Facility Name (N/A for scene EMR run): [facility]    INJURIES:   R subdural hematoma with 1.5 cm Right to left shift      OTHER MEDICAL PROBLEMS:  Prior fracture deformity of L transverse process of L5.  R posterior rib fracture of 12th rib unchanged since scan on 22.    INCIDENTAL FINDINGS:  Densities around left kidney  Ventral hernia containing bowel segment.   Solid lung nodule in Right lower lobe increased in size from previous exam on 21 and now .7X.6cm.  ==============================================================================  ADMISSION PLAN OF CARE:  R subdural hematoma  Admit to ICU. Ortho consulted and will take to OR for L craniotomy/ectomy for subdural hematoma evacuation. Platelets and DDAVP prior to OR  Pain control  Intubated: wean vent as able  NPO  Plavix/coumadin reversed with PCC and DDAVP  Daily coags  Periop abx: Ancef    Consultants notified (specialty, provider name, time): KELLEE     Pt seen and discussed with attending Dr. Kent     Total face to face time spent with patient/family of 30 minutes, with >50% of the time spent discussing plan of care/management, counseling/educating on disease processes, explaining results of diagnostic testing. I have reviewed all medications,  laboratory results, and imaging pertinent for today's encounter.    Cecile Fitzgerald PA-C  Trauma, Critical Care, Acute Care Surgery   Floor: [id number]  TSICU: [id number]    ==============================================================================  PAST MEDICAL HISTORY:   PMH:   Past Medical History:   Diagnosis Date    Abnormal weight loss 12/22/2020    Excessive weight loss    Personal history of other venous thrombosis and embolism     History of deep venous thrombosis       PSH:   Past Surgical History:   Procedure Laterality Date    CHOLECYSTECTOMY  06/11/2015    Cholecystectomy    HAND SURGERY  06/11/2015    Hand Surgery                                                                                                                                                          HEMORRHOID SURGERY  06/11/2015    Hemorrhoidectomy     SOCIAL HISTORY:    Smoking: unknown  Social History     Tobacco Use   Smoking Status Not on file   Smokeless Tobacco Not on file       Alcohol: unknown  Social History     Substance and Sexual Activity   Alcohol Use Not on file       Drug use: unknown    MEDICATIONS:   Prior to Admission medications    Medication Sig Start Date End Date Taking? Authorizing Provider   allopurinol (Zyloprim) 100 mg tablet TAKE 1 TABLET TWICE A DAY 12/29/23   Niranjan Ferrari DO   aspirin 81 mg EC tablet Take 1 tablet (81 mg) by mouth once daily. 5/11/22   Historical Provider, MD   atorvastatin (Lipitor) 40 mg tablet Take 1 tablet (40 mg) by mouth once daily. 1/28/23   Historical Provider, MD   ciclopirox (Penlac) 8 % solution APPLY TO AFFECTED NAILS ONCE DAILY. REMOVE ONCE WEEKLY WITH ALCOHOL 2/16/23   Historical Provider, MD   clotrimazole-betamethasone (Lotrisone) lotion Apply topically every 2 hours if needed (apply every 2 hours as needed if itching persists). 12/19/23   Niranjan Ferrari DO   gabapentin (Neurontin) 300 mg capsule Take 1 capsule (300 mg) by mouth every 6 hours during the day. 5/20/22    Historical Provider, MD   lidocaine (Lidoderm) 5 % patch PLACE 1 PATCH OVER 12 HOURS ON THE SKIN ONCE DAILY. APPLY TO PAINFUL AREA 12 HOURS PER DAY, REMOVE FOR 12 HOURS. 4/18/24 4/18/25  Niranjan Ferrari DO   meclizine (Antivert) 25 mg tablet TAKE 1 TABLET BY MOUTH THREE TIMES A DAY AS NEEDED 12/22/23   Niranjan Ferrari DO   metaxalone (Skelaxin) 800 mg tablet TAKE 1 TABLET DAILY 3/15/24   Niranjan Ferrari DO   methylPREDNISolone (Medrol Dospak) 4 mg tablets TAKE 6 TABLETS ON DAY 1 AS DIRECTED ON PACKAGE AND DECREASE BY 1 TAB EACH DAY FOR A TOTAL OF 6 DAYS 4/29/22   Historical Provider, MD   nitroglycerin (Nitrostat) 0.4 mg SL tablet TAKE 1 TABS SUBLINGUAL EVERY 5 MINUTES AS NEEDED FOR CHEST PAIN 5/16/22   Historical Provider, MD   omeprazole (PriLOSEC) 20 mg DR capsule TAKE 1 CAPSULE DAILY 4/13/23   Yunior Nava DO   tamsulosin (Flomax) 0.4 mg 24 hr capsule Take 1 capsule (0.4 mg) by mouth once daily. 2/7/23   Historical Provider, MD   warfarin (Coumadin) 5 mg tablet TAKE 1 TABLET (5 MG) FIVE DAYS A WEEK AND TAKE ONE-HALF (1/2) TABLET (2.5 MG) TWO DAYS A WEEK 10/26/23   Niranjan Ferrari DO     ALLERGIES: contrast  No Known Allergies    REVIEW OF SYSTEMS:  Review of Systems   Constitutional:  Negative for fever.   HENT:  Negative for mouth sores and sneezing.    Eyes:  Negative for discharge.   Respiratory:  Negative for choking and wheezing.    Cardiovascular:  Positive for leg swelling.        B/l lower extremity swelling   Gastrointestinal:  Positive for abdominal distention.   Genitourinary:  Positive for scrotal swelling.        Scrotal hematoma   Musculoskeletal:  Positive for joint swelling.        Swelling of Left medial ankle   Skin:         Severe skin break down generalized   Neurological:  Negative for tremors and seizures.     PHYSICAL EXAM:  PRIMARY SURVEY:  Airway  Airway is patent. Patient was pre-arrival intubation prior to arrival with a(n) endotracheal tube.    Breathing  Breathing is  normal. Right breath sounds are normal. Left breath sounds are normal.     Circulation  Cardiac rhythm is regular. Rate is regular.   Pulses  Radial: 2+ on the right; 2+ on the left.  Femoral: 2+ on the right; 2+ on the left.  Pedal: 2+ on the right; 2+ on the left.    Disability  Garrison Coma Score  Eye:1   Verbal:1T   Motor:1      3T  Pupils  Right Pupil:   round   not reactive     Left Pupil:   round   not reactive        Motor Strength   strength:  0/5 on the right  0/5 on the left  Dorsiflex strength:  0/5 on the right  0/5 on the left  Plantarflex strength:  0/5 on the right  0/5 on the left        SECONDARY SURVEY/PHYSICAL EXAM:  Physical Exam  Vitals reviewed.   Constitutional:       Appearance: He is obese. He is ill-appearing.   HENT:      Head: Normocephalic.      Right Ear: External ear normal.      Left Ear: External ear normal.      Nose: Nose normal.      Mouth/Throat:      Mouth: Mucous membranes are moist.      Pharynx: Oropharynx is clear.      Comments: Intubated  Eyes:      Comments: B/l fixed pinpoint pupils   Cardiovascular:      Rate and Rhythm: Normal rate and regular rhythm.      Pulses: Normal pulses.      Heart sounds: Normal heart sounds.   Pulmonary:      Effort: Pulmonary effort is normal.      Breath sounds: Normal breath sounds.   Abdominal:      General: There is distension.      Comments: Ventral hernia on palpation. Morbidly obese abdomen   Genitourinary:     Comments: Scrotal hematoma. No blood at urethral meatus  Musculoskeletal:         General: Swelling present.      Right lower leg: Edema present.      Left lower leg: Edema present.      Comments: Swelling of medial L ankle.    Skin:     General: Skin is warm and dry.      Findings: Bruising, erythema, lesion and rash present.      Comments: Purpura to upper b/L arms and chest. Scattered ecchymosis to b/l UE. Erythematous rash under chest skin folds and abdominal skin folds. 3 cm skin tear underneath the right side of the  abdominal skin fold. 4 cm bullae on b/L knees. Scrotal hematoma. Left bullae is ruptured.   Neurological:      Comments: No purposeful movements. Currently intubated GCS 3.        IMAGING SUMMARY:  (summary of findings, not a copy of dictation). Reviewed images via EMS from [facility].   CT Head/Face: R cerebral subdural hematoma 1.5 cm with right to left midline shift measuring 2 cm  CT C-Spine: Prior fracture deformity of L transverse process of L5.  CT Chest/Abd/Pelvis: no traumatic abnormality with abdomen or pelvis. Densities around left kidney. Ventral hernia containing bowel segment. No evidence of bowel obstruction. Inflammatory stranding of superficial soft tissues of anterior/inferior abdominopelvic wall.  Solid lung nodule in Right lower lobe increased in size from previous exam on 5/6/21 and now .7X.6cm. R posterior rib fracture of 12th rib unchanged since scan on 11/30/22.  CXR/PXR: diffuse perihilar interstitial prominence with bibasilar opacity. No acute fracture      LABS:  Results for orders placed or performed during the hospital encounter of 08/24/24 (from the past 24 hour(s))   Blood Gas Venous Full Panel Unsolicited   Result Value Ref Range    POCT pH, Venous 7.29 (L) 7.33 - 7.43 pH    POCT pCO2, Venous 48 41 - 51 mm Hg    POCT pO2, Venous 37 35 - 45 mm Hg    POCT SO2, Venous 56 45 - 75 %    POCT Oxy Hemoglobin, Venous 54.6 45.0 - 75.0 %    POCT Hematocrit Calculated, Venous 27.0 (L) 41.0 - 52.0 %    POCT Sodium, Venous 141 136 - 145 mmol/L    POCT Potassium, Venous 5.2 3.5 - 5.3 mmol/L    POCT Chloride, Venous 114 (H) 98 - 107 mmol/L    POCT Ionized Calicum, Venous 1.18 1.10 - 1.33 mmol/L    POCT Glucose, Venous 137 (H) 74 - 99 mg/dL    POCT Lactate, Venous 1.6 0.4 - 2.0 mmol/L    POCT Base Excess, Venous -3.5 (L) -2.0 - 3.0 mmol/L    POCT HCO3 Calculated, Venous 23.1 22.0 - 26.0 mmol/L    POCT Hemoglobin, Venous 9.1 (L) 13.5 - 17.5 g/dL    POCT Anion Gap, Venous 9.0 (L) 10.0 - 25.0 mmol/L     Patient Temperature 37.0 degrees Celsius   Comprehensive Metabolic Panel   Result Value Ref Range    Glucose 129 (H) 74 - 99 mg/dL    Sodium 141 136 - 145 mmol/L    Potassium 4.8 3.5 - 5.3 mmol/L    Chloride 111 (H) 98 - 107 mmol/L    Bicarbonate 20 (L) 21 - 32 mmol/L    Anion Gap 15 10 - 20 mmol/L    Urea Nitrogen 31 (H) 6 - 23 mg/dL    Creatinine 1.59 (H) 0.50 - 1.30 mg/dL    eGFR 45 (L) >60 mL/min/1.73m*2    Calcium 8.2 (L) 8.6 - 10.6 mg/dL    Albumin 3.2 (L) 3.4 - 5.0 g/dL    Alkaline Phosphatase 54 33 - 136 U/L    Total Protein 6.2 (L) 6.4 - 8.2 g/dL    AST 32 9 - 39 U/L    Bilirubin, Total 0.6 0.0 - 1.2 mg/dL    ALT 12 10 - 52 U/L   Alcohol   Result Value Ref Range    Alcohol <10 <=10 mg/dL   Phosphorus   Result Value Ref Range    Phosphorus 2.7 2.5 - 4.9 mg/dL   Prepare Platelets   Result Value Ref Range    PRODUCT CODE M4759M72     Unit Number [id number]     Unit ABO O     Unit RH POS     Dispense Status EI     Blood Expiration Date 8/25/2024 11:59:00 PM EDT     PRODUCT BLOOD TYPE 5100     UNIT VOLUME 277     PRODUCT CODE C1727E04     Unit Number [id number]     Unit ABO O     Unit RH POS     Dispense Status RE     Blood Expiration Date 8/25/2024 11:59:00 PM EDT     PRODUCT BLOOD TYPE 5100     UNIT VOLUME 281    Prepare Platelets   Result Value Ref Range    PRODUCT CODE U3755Q25     Unit Number [id number]     Unit ABO AB     Unit RH POS     Dispense Status EI     Blood Expiration Date 8/25/2024 11:59:00 PM EDT     PRODUCT BLOOD TYPE 8400     UNIT VOLUME 283        I have reviewed all laboratory and imaging results ordered/pertinent for this encounter.

## 2024-08-25 NOTE — CONSULTS
Reason For Consult  Acute subdural hematoma    History Of Present Illness  Travis Hunt is a 78 y.o. male presenting with mechanical fall vs syncopal event with + headstrike.     Past Medical History  He has a past medical history of Abnormal weight loss (12/22/2020) and Personal history of other venous thrombosis and embolism.    Surgical History  He has a past surgical history that includes Hemorrhoid surgery (06/11/2015); Cholecystectomy (06/11/2015); and Hand surgery (06/11/2015).     Social History  He has no history on file for tobacco use, alcohol use, and drug use.    Family History  No family history on file.     Allergies  Patient has no known allergies.    Review of Systems  10 point ROS is obtained and negative except the ones mentioned in the HPI      Physical Exam  General: intubated, no sedation  HEENT: mucus membranes moist  Neuro:   Intubated  Eyes open to noxious stimulus  Cranial Nerves II-XII: PERRL, both eyes 3R, +c/+c/+g,  Face symmetric,   Motor: RUE spontaneous  RLE withdraws to noxious stimulus  L flaccid  Sensation: unable to assess due to patient's mental status  DTRS: 2+ Throughout, No Hoffmans or Clonus   Cardiac: RRR on monitor  Respiratory: breathing comfortably on RA  Abdomen: non tender, non distended   Skin: ecchymosis throughout     Last Recorded Vitals  Blood pressure 136/74, pulse 83, resp. rate 17, SpO2 100%.    Relevant Results  Imaging personally reviewed and is significant for: CTH with left > 1cm acute traumatic subdural with tentorial component causing significant compression and MLS.      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 Dhillon who agrees with the above recommendations    Wilma Miramontes MD

## 2024-08-25 NOTE — HOSPITAL COURSE
77 yo male presented to  General ED after fall backwards from a chair. + Headstrike Unknown LOC. Altered and combative, intubated to protect airway. Transferred via EMS to [facility]. Pt underwent Right decompressive craniotomy for hematoma evacuation on 8/24, and was admitted to TICU. Pt remained intubated for poor GCS. Completed keppra seizure ppx. Pt did develop VAP on 8/30 for which antibiotics were initiated. BAL cultures grew E coli and GBS for which patient was maintained on cefepime.  Due to poor mental status, vEEG ordered which showed severe diffuse encephalopathy and right hemispheric structural lesion. No seizures seen. He also underwent MRI/MRV with no new findings. Neuroprognostication team was consulted on 9/7. Clarified that based on imaging and exam, the patient has some neurologic functional reserve; especially thalami and brainstem are largely reserved and pt is tracking. However, cognitive, motor, visual and sensory deficits can be expected, and recovery to pt's new baseline will likely take a considerable amount of time during which he might need an artifical airway and PEG.      Current status is due to both underlying structural abnormality (large SDH, midline shift, R frontal contusion) as well as metabolic (eg, DARIUSZ) and infectious illness (eg, pneumonia). From the neurologic perspective, his prognosis remains guarded and the chance of functional and meaningful neurologic recovery is, unfortunately, unlikely. Recommend ongoing GOC with family by primary team.     Ongoing goals of care with family. On 9/12, patient's family elected to pursue comfort care/palliative measures. Patient was compassionately extubated per wishes of the family. Comfort care protocols initiated. Time of death declared at 19:17 on 9/12/2024 by JANET Urias MD. Exam was documented in EMR. Multiple unsuccessful attempts were made to notify family by phone. Post-mortem measures implemented by nursing team. Patient transferred  [facility]on [facility].

## 2024-08-25 NOTE — CONSULTS
Wound Care Consult     Visit Date: 8/25/2024      Patient Name: Travis Hunt         MRN: [id number]           YOB: 1945     Reason for Consult: assess sacrum, B/L knees, Left hand, groin              Pertinent Labs:   Albumin   Date Value Ref Range Status   08/25/2024 2.9 (L) 3.4 - 5.0 g/dL Final       Wound Assessment:  Wound 08/24/24  Buttocks Right (Active)   Wound Image   08/25/24 1239   Margins Attached edges 08/25/24 0400   Drainage Description None 08/25/24 0400   Drainage Amount None 08/25/24 0400   Dressing Foam 08/25/24 0400       Wound 08/24/24 Pretibial Proximal;Right (Active)   Wound Image   08/24/24 2150   Drainage Description Sanguineous 08/25/24 0400   Drainage Amount Scant 08/25/24 0400   Dressing Open to air 08/25/24 0400       Wound 08/24/24 Pretibial Left;Proximal (Active)   Wound Image   08/25/24 1232   Wound Length (cm) 5 cm 08/25/24 1232   Wound Width (cm) 4.5 cm 08/25/24 1232   Wound Surface Area (cm^2) 22.5 cm^2 08/25/24 1232   Drainage Description None 08/25/24 1232   Drainage Amount None 08/25/24 1232   Dressing Xeroform;Silicone border dressing 08/25/24 1232   Dressing Changed New 08/25/24 1232   Dressing Status Clean 08/25/24 1232       Wound 08/24/24 Leg Left;Proximal;Upper;Ventral (Active)   Wound Image   08/24/24 2151   Drainage Description Sanguineous 08/25/24 0400   Drainage Amount Small 08/25/24 0400   Dressing Open to air 08/25/24 0400       Wound 08/24/24 Leg Proximal;Right;Upper;Ventral (Active)   Wound Image   08/24/24 2153   Drainage Description Sanguineous 08/25/24 0400   Drainage Amount Small 08/25/24 0400   Dressing Open to air 08/25/24 0400       Wound 08/24/24 Incision Head Right;Upper (Active)   Closure Approximated;Staples 08/25/24 0400   Sutures/Staple Line Approximated 08/25/24 0400   Dressing Non adherent 08/25/24 0400   Dressing Changed New 08/25/24 0101   Dressing Status Clean;Dry;Occlusive 08/25/24 0400       Wound 08/25/24 Hand Dorsal;Left (Active)    Wound Image   08/25/24 1224   Wound Length (cm) 4 cm 08/25/24 1224   Wound Width (cm) 3.5 cm 08/25/24 1224   Wound Surface Area (cm^2) 14 cm^2 08/25/24 1224   Wound Depth (cm) 0.2 cm 08/25/24 1224   Wound Volume (cm^3) 2.8 cm^3 08/25/24 1224   Drainage Description Sanguineous 08/25/24 1224   Drainage Amount Small 08/25/24 1224   Dressing Xeroform;Silicone border dressing 08/25/24 1224   Dressing Changed New 08/25/24 1224   Dressing Status Clean 08/25/24 1224       Wound Pressure Injury Ischium Left (Active)   Site Assessment Purple 08/25/24 1239   Shahana-Wound Assessment Ecchymotic 08/25/24 1239   Pressure Injury Stage DTPI 08/25/24 1239   Wound Length (cm) 6 cm 08/25/24 1239   Wound Width (cm) 6 cm 08/25/24 1239   Wound Surface Area (cm^2) 36 cm^2 08/25/24 1239   Drainage Description Serosanguineous 08/25/24 1239   Drainage Amount Scant 08/25/24 1239   Dressing Silicone border dressing;Xeroform 08/25/24 1239   Dressing Changed New 08/25/24 1239   Dressing Status Clean 08/25/24 1239       Wound Pressure Injury Ischium Right (Active)   Site Assessment Other (Comment);Purple 08/25/24 1239   Shahana-Wound Assessment Ecchymotic 08/25/24 1239   Pressure Injury Stage DTPI 08/25/24 1239   Wound Length (cm) 6 cm 08/25/24 1239   Wound Width (cm) 5 cm 08/25/24 1239   Wound Surface Area (cm^2) 30 cm^2 08/25/24 1239   Drainage Description Sanguineous 08/25/24 1239   Drainage Amount Scant 08/25/24 1239   Dressing Silicone border dressing;Xeroform 08/25/24 1239   Dressing Changed New 08/25/24 1239   Dressing Status Clean 08/25/24 1239       Wound Team Summary Assessment:   Recommendation for Left hand:   Cleanse wound with normal saline   Cover skin tear with Xeroform  Apply Mepilex border dressing   Apply second layer of DSD and kerlix if needed for pressure r/t bleeding     Recommendation for Left knee   Gently cleanse wound with normal saline   Cover blister with Xeroform   Cover with Mepilex border dressing     Recommendation  For B/L ischiums :  Gently cleanse wound with normal saline   Cover blister with Xeroform   Cover with Mepilex border dressing   Turn every 2 hours   Elevate heels at all times      Wound Team Plan: Please review recommendations. Wound care will reassess 9/2    Krys Martins RN CWON  8/25/2024  5:01 PM

## 2024-08-25 NOTE — ED PROVIDER NOTES
Emergency Department Provider Note        History of Present Illness     History provided by: EMS  Limitations to History: Trauma Activation    HPI:  Travis Hunt is a 78 y.o. male presenting to the ED as a Full Trauma Activation after found with subarachnoid at outside hospital.  Patient was intubated at outside hospital for airway protection.  On arrival to trauma bay, patient's GCS is 3T.  Sedation turned off.  Patient arrived from [facility] after a fall backwards from a chair.  On aspirin and Plavix, received Kcentra and vitamin K at outside hospital.  NIH of 4 and became were altered and combative so he was intubated.  CT at the outside hospital revealed a right subdural hematoma with 1.5 cm right-to-left shift    Physical Exam   Arrival Vitals:  T    HR 90  /68  RR 18  O2 100 %      Primary Survey:  Airway: Intact & patent  Breathing: Equal breath sounds bilaterally  Circulation: 2+ radial and DP pulses bilaterally  Disability: GCS 3.  Gross motor and sensation intact in the bilateral upper and lower extremities.  Exposure: Patient fully exposed, warm blankets applied    Secondary Survey:    Head: Atraumatic. No cephalohematoma.  Eye: Pupils equal, round, and reactive to light. Gaze is conjugate. No orbital ridge bony step-offs, or tenderness.  ENT:   Midface is stable.  No mandibular tenderness or dental malocclusion's.  There is no nasal bone tenderness or deformity.  No epistaxis.  No blood or CSF drainage and external auditory canals.  No intraoral lesions.  Neck: Cervical collar in place. There is no C-spine midline tenderness to palpation, step-offs, or deformities.  Trachea is midline.  Chest: Clear to auscultation bilaterally, no chest wall tenderness palpation, crepitus, flail segments noted.  No bruising or abrasions noted to the anterior chest wall.  Cardiovascular: Regular rate, rhythm  Abdomen: Soft, nontender, nondistended.  No bruising or lacerations noted.  Not  peritonitic.  Ventral hernia  Pelvis: Stable to compression  : Normal external genitalia, no blood at the urethral meatus  Back/spine: No midline T or L-spine tenderness palpation, step-offs, or deformities.  No lacerations, abrasions, or bruising noted.  Extremities: No gross bony deformities, no bony tenderness palpation.  Full range of motion all in 4 extremities.  Skin: Multiple skin lesions and abrasions present over the bilateral chest wall.  Patient also has large violaceous bullae with surrounding erythema in the bilateral knees.    Neuro: Alert and oriented to person, place, time.  Face symmetric, speech fluent.  Gross motor and sensory function intact in the bilateral upper and lower extremities.    Medical Decision Making & ED Course   Medical Decision Makin y.o. male presenting to the ED as a Full Trauma Activation after fall with head bleed.  On arrival to the ED, the patient was immediately brought to the resuscitation bay.  Primary and secondary survey completed.  Chest x-ray in the trauma bay ordered.  Patient will require hemicraniotomy with neurosurgery.  Patient stabilized, and immediately transported to the trauma ICU for further management.  ----    EKG Independent Interpretation: EKG interpreted by myself. Please see ED Course for full interpretation.    Independent Result Review and Interpretation: Relevant laboratory and radiographic results were reviewed and independently interpreted by myself.  As necessary, they are commented on in the ED Course.    Social Determinants of Health which Significantly Impact Care: None identified     Chronic conditions affecting the patient's care: As documented above in [facility]    The patient was discussed with the following consultants/services: Trauma Surgery regarding traumatic injuries and final disposition.    Care Considerations: As documented above in [facility]    ED Course:       Disposition   As a result of their workup, the patient will require  admission to the hospital.  The patient was informed of his diagnosis.  The patient was given the opportunity to ask questions and I answered them. The patient agreed to be admitted to the hospital.    Procedures   Procedures    Patient seen and discussed with ED attending physician.    Oksana Morales DO  Emergency Medicine       Oksana Morales DO  Resident  08/24/24 5613

## 2024-08-25 NOTE — ANESTHESIA PREPROCEDURE EVALUATION
Patient: Travis Hunt    Procedure Information       Date: 08/24/24    Procedure: CRANIECTOMY OR CRANIOTOMY,SUPRATENTORIAL,EXPLORATORY (Right)    Location: [facility] OR    Surgeons: Zion Dhillon MD            Relevant Problems   Anesthesia (within normal limits)      Cardiac   (+) Coronary arteriosclerosis   (+) Hyperlipidemia   (+) Stented coronary artery (Per wife, LAD and RCA stented in 2022)      Pulmonary   (+) Pulmonary embolus (Multi) (History of PE per wife )      Neuro  Current traumatic subdural hematoma       GI   (+) Irritable bowel syndrome      /Renal  History of nephrectomy      Liver (within normal limits)      Hematology  History of DVT and PE, on warfarin and Plavix at home for DVT/PE history and coronary stent    (+) Anticoagulant long-term use   (+) DVT (deep venous thrombosis) (Multi)      Musculoskeletal  Lower extremity skin changes noted bilaterally        Clinical information reviewed:                   NPO Detail:  No data recorded     Drug Allergies per wife: penicillin, iodine, shellfish     Physical Exam    Airway  Mallampati: unable to assess  Comments: ETT in situ, cervical collar in place    Cardiovascular    Dental    Pulmonary    Abdominal      Other findings: ETT in situ, cervical collar in place, PIV          Anesthesia Plan    History of general anesthesia?: yes  History of complications of general anesthesia?: no    ASA 3 - emergent     general   (GETA, ETT in situ, arterial line, additional PIV access )  inhalational induction   Anesthetic plan and risks discussed with spouse.  Use of blood products discussed with spouse who consented to blood products.    Plan discussed with resident and attending.    Patient with subdural hematoma with emergency need for OR for evacuation with Neurosurgery. Patient seen in TICU, intubated and on ventilator. Per report, patient received neuromuscular blockade for intubation which was not reversed; no sedation on board when patient  seen in TICU. Patient's history and anesthesia plan and risks discussed with patient's wife and son-in-law at the bedside. Questions answered, patient's wife agrees to proceed. Will plan to return to TICU, likely intubated, post-operatively. Patient transported with ICU monitors in place, Ambu-bag for ventilation via ETT in situ to OR. Vitals monitored and stable throughout transport.     Preoperative evaluation done prior to proceeding to OR, entered late due to nature of emergent case.   GO Raymond DO

## 2024-08-25 NOTE — ANESTHESIA POSTPROCEDURE EVALUATION
Patient: Travis Hunt    Procedure Summary       Date: 08/24/24 Room / Location: [facility] OR 25 / [facility] OR    Anesthesia Start: 2219 Anesthesia Stop: 08/25/24 0115    Procedure: RIGHT CRANIOTOMY,SUPRATENTORIAL,EXPLORATORY, SUBDURAL HEMATOMA EVACUATION (Right) Diagnosis:       Subdural hematoma (Multi)      (Subdural hematoma (Multi) [S06.5XAA])    Surgeons: Zion Dhillon MD Responsible Provider: Alessandra Raymond DO    Anesthesia Type: general ASA Status: 3 - Emergent            Anesthesia Type: general    Vitals Value Taken Time   /92 08/25/24 0108   Temp 36 08/25/24 0115   Pulse 87 08/25/24 0114   Resp 22 08/25/24 0057   SpO2 100 08/25/24 0115   Vitals shown include unfiled device data.    Anesthesia Post Evaluation    Patient location during evaluation: ICU  Patient participation: complete - patient cannot participate  Level of consciousness: sedated  Pain score: 0  Pain management: adequate  Multimodal analgesia pain management approach  Airway patency: patent  Cardiovascular status: acceptable and hemodynamically stable  Respiratory status: acceptable, intubated and ventilator  Hydration status: acceptable  Postoperative Nausea and Vomiting: none        There were no known notable events for this encounter.

## 2024-08-25 NOTE — ANESTHESIA PROCEDURE NOTES
Peripheral IV  Date/Time: 8/24/2024 11:50 AM      Placement  Needle size: 16 G  Laterality: left  Location: antecubital  Local anesthetic: none  Site prep: chlorhexidine  Technique: ultrasound guided  Attempts: 1  Difficult Venous Access: Yes

## 2024-08-25 NOTE — ED TRIAGE NOTES
Patient is a full trauma activation from [facility] following a fall backwards into a chair; patient is on ASA and Plavix and received kcentra and Vitamin K at OSH to reverse this; he was initially with a NIH of 4, but progressively became more altered and combative, so they ultimately made the decision to intubate him; GCS 3T; R pupil is fixed and pinpoint; CT at OSH revealed R SDH 1.5 cm with R to L midline shift; also has a ventral hernia

## 2024-08-25 NOTE — ANESTHESIA PROCEDURE NOTES
Arterial Line:    Date/Time: 8/24/2024 10:38 PM    Staffing  Performed: attending   Authorized by: Alessandra Raymond DO    Performed by: Carlos Alberto Boyd MD    An arterial line was placed. Procedure performed using ultrasound guidance.in the OR for the following indication(s): continuous blood pressure monitoring and blood sampling needed.    A 20 gauge (size), 1 and 3/4 inch (length), Angiocath (type) catheter was placed into the Right radial artery, secured by Tegaderm,   Seldinger technique used.  Events:  patient tolerated procedure well with no complications.

## 2024-08-26 ENCOUNTER — APPOINTMENT (OUTPATIENT)
Dept: CARDIOLOGY | Facility: HOSPITAL | Age: 79
End: 2024-08-26
Payer: MEDICARE

## 2024-08-26 VITALS
BODY MASS INDEX: 46.65 KG/M2 | HEIGHT: 69 IN | DIASTOLIC BLOOD PRESSURE: 44 MMHG | RESPIRATION RATE: 16 BRPM | HEART RATE: 78 BPM | SYSTOLIC BLOOD PRESSURE: 152 MMHG | WEIGHT: 315 LBS | OXYGEN SATURATION: 100 % | TEMPERATURE: 97 F

## 2024-08-26 LAB
ALBUMIN SERPL BCP-MCNC: 2.7 G/DL (ref 3.4–5)
ANION GAP BLDA CALCULATED.4IONS-SCNC: 11 MMO/L (ref 10–25)
ANION GAP SERPL CALC-SCNC: 12 MMOL/L (ref 10–20)
ANION GAP SERPL CALC-SCNC: 13 MMOL/L (ref 10–20)
AORTIC VALVE MEAN GRADIENT: 23 MMHG
AORTIC VALVE PEAK VELOCITY: 3.39 M/S
APTT PPP: 28 SECONDS (ref 27–38)
AV PEAK GRADIENT: 46 MMHG
AVA (PEAK VEL): 1.29 CM2
AVA (VTI): 1.35 CM2
BASE EXCESS BLDA CALC-SCNC: -4.5 MMOL/L (ref -2–3)
BLOOD EXPIRATION DATE: NORMAL
BNP SERPL-MCNC: 55 PG/ML (ref 0–99)
BODY SURFACE AREA: 2.65 M2
BODY TEMPERATURE: 37 DEGREES CELSIUS
BUN SERPL-MCNC: 22 MG/DL (ref 6–23)
BUN SERPL-MCNC: 23 MG/DL (ref 6–23)
CA-I BLDA-SCNC: 1.15 MMOL/L (ref 1.1–1.33)
CALCIUM SERPL-MCNC: 7.4 MG/DL (ref 8.6–10.6)
CALCIUM SERPL-MCNC: 7.5 MG/DL (ref 8.6–10.6)
CHLORIDE BLDA-SCNC: 110 MMOL/L (ref 98–107)
CHLORIDE SERPL-SCNC: 110 MMOL/L (ref 98–107)
CHLORIDE SERPL-SCNC: 111 MMOL/L (ref 98–107)
CO2 SERPL-SCNC: 22 MMOL/L (ref 21–32)
CO2 SERPL-SCNC: 23 MMOL/L (ref 21–32)
CREAT SERPL-MCNC: 1.11 MG/DL (ref 0.5–1.3)
CREAT SERPL-MCNC: 1.22 MG/DL (ref 0.5–1.3)
DISPENSE STATUS: NORMAL
EGFRCR SERPLBLD CKD-EPI 2021: 61 ML/MIN/1.73M*2
EGFRCR SERPLBLD CKD-EPI 2021: 69 ML/MIN/1.73M*2
EJECTION FRACTION APICAL 4 CHAMBER: 66.1
EJECTION FRACTION: 68 %
ERYTHROCYTE [DISTWIDTH] IN BLOOD BY AUTOMATED COUNT: 16.1 % (ref 11.5–14.5)
GLUCOSE BLD MANUAL STRIP-MCNC: 117 MG/DL (ref 74–99)
GLUCOSE BLD MANUAL STRIP-MCNC: 120 MG/DL (ref 74–99)
GLUCOSE BLD MANUAL STRIP-MCNC: 136 MG/DL (ref 74–99)
GLUCOSE BLD MANUAL STRIP-MCNC: 138 MG/DL (ref 74–99)
GLUCOSE BLD MANUAL STRIP-MCNC: 147 MG/DL (ref 74–99)
GLUCOSE BLDA-MCNC: 147 MG/DL (ref 74–99)
GLUCOSE SERPL-MCNC: 137 MG/DL (ref 74–99)
GLUCOSE SERPL-MCNC: 144 MG/DL (ref 74–99)
HCO3 BLDA-SCNC: 21.1 MMOL/L (ref 22–26)
HCT VFR BLD AUTO: 24.1 % (ref 41–52)
HCT VFR BLD EST: 29 % (ref 41–52)
HGB BLD-MCNC: 7.4 G/DL (ref 13.5–17.5)
HGB BLDA-MCNC: 9.8 G/DL (ref 13.5–17.5)
INHALED O2 CONCENTRATION: 40 %
INR PPP: 1.1 (ref 0.9–1.1)
LACTATE BLDA-SCNC: 0.7 MMOL/L (ref 0.4–2)
LEFT ATRIUM VOLUME AREA LENGTH INDEX BSA: 27.2 ML/M2
LEFT VENTRICLE INTERNAL DIMENSION DIASTOLE: 5.2 CM (ref 3.5–6)
LEFT VENTRICULAR OUTFLOW TRACT DIAMETER: 2.5 CM
MAGNESIUM SERPL-MCNC: 2.2 MG/DL (ref 1.6–2.4)
MCH RBC QN AUTO: 28.8 PG (ref 26–34)
MCHC RBC AUTO-ENTMCNC: 30.7 G/DL (ref 32–36)
MCV RBC AUTO: 94 FL (ref 80–100)
MITRAL VALVE E/A RATIO: 0.68
NRBC BLD-RTO: 0.5 /100 WBCS (ref 0–0)
OXYHGB MFR BLDA: 96.6 % (ref 94–98)
PCO2 BLDA: 40 MM HG (ref 38–42)
PH BLDA: 7.33 PH (ref 7.38–7.42)
PHOSPHATE SERPL-MCNC: 2.4 MG/DL (ref 2.5–4.9)
PLATELET # BLD AUTO: 165 X10*3/UL (ref 150–450)
PO2 BLDA: 120 MM HG (ref 85–95)
POTASSIUM BLDA-SCNC: 4.7 MMOL/L (ref 3.5–5.3)
POTASSIUM SERPL-SCNC: 4.6 MMOL/L (ref 3.5–5.3)
POTASSIUM SERPL-SCNC: 4.6 MMOL/L (ref 3.5–5.3)
PRODUCT BLOOD TYPE: 5100
PRODUCT BLOOD TYPE: 5100
PRODUCT BLOOD TYPE: 8400
PRODUCT CODE: NORMAL
PROTHROMBIN TIME: 12.2 SECONDS (ref 9.8–12.8)
RBC # BLD AUTO: 2.57 X10*6/UL (ref 4.5–5.9)
RIGHT VENTRICLE FREE WALL PEAK S': 21.2 CM/S
RIGHT VENTRICLE PEAK SYSTOLIC PRESSURE: 40.9 MMHG
SAO2 % BLDA: 99 % (ref 94–100)
SODIUM BLDA-SCNC: 137 MMOL/L (ref 136–145)
SODIUM SERPL-SCNC: 140 MMOL/L (ref 136–145)
SODIUM SERPL-SCNC: 141 MMOL/L (ref 136–145)
TRICUSPID ANNULAR PLANE SYSTOLIC EXCURSION: 2.4 CM
UNIT ABO: NORMAL
UNIT NUMBER: NORMAL
UNIT RH: NORMAL
UNIT VOLUME: 277
UNIT VOLUME: 281
UNIT VOLUME: 283
WBC # BLD AUTO: 13.3 X10*3/UL (ref 4.4–11.3)

## 2024-08-26 PROCEDURE — 71045 X-RAY EXAM CHEST 1 VIEW: CPT | Performed by: RADIOLOGY

## 2024-08-26 PROCEDURE — 02HV33Z INSERTION OF INFUSION DEVICE INTO SUPERIOR VENA CAVA, PERCUTANEOUS APPROACH: ICD-10-PCS | Performed by: SURGERY

## 2024-08-26 PROCEDURE — 2500000004 HC RX 250 GENERAL PHARMACY W/ HCPCS (ALT 636 FOR OP/ED): Performed by: EMERGENCY MEDICINE

## 2024-08-26 PROCEDURE — 85610 PROTHROMBIN TIME: CPT

## 2024-08-26 PROCEDURE — 37799 UNLISTED PX VASCULAR SURGERY: CPT | Performed by: STUDENT IN AN ORGANIZED HEALTH CARE EDUCATION/TRAINING PROGRAM

## 2024-08-26 PROCEDURE — 37799 UNLISTED PX VASCULAR SURGERY: CPT

## 2024-08-26 PROCEDURE — 83735 ASSAY OF MAGNESIUM: CPT

## 2024-08-26 PROCEDURE — 94003 VENT MGMT INPAT SUBQ DAY: CPT

## 2024-08-26 PROCEDURE — 84132 ASSAY OF SERUM POTASSIUM: CPT

## 2024-08-26 PROCEDURE — 0B9J8ZX DRAINAGE OF LEFT LOWER LUNG LOBE, VIA NATURAL OR ARTIFICIAL OPENING ENDOSCOPIC, DIAGNOSTIC: ICD-10-PCS | Performed by: SURGERY

## 2024-08-26 PROCEDURE — 82947 ASSAY GLUCOSE BLOOD QUANT: CPT

## 2024-08-26 PROCEDURE — 84132 ASSAY OF SERUM POTASSIUM: CPT | Performed by: STUDENT IN AN ORGANIZED HEALTH CARE EDUCATION/TRAINING PROGRAM

## 2024-08-26 PROCEDURE — 2580000001 HC RX 258 IV SOLUTIONS: Performed by: STUDENT IN AN ORGANIZED HEALTH CARE EDUCATION/TRAINING PROGRAM

## 2024-08-26 PROCEDURE — 2500000004 HC RX 250 GENERAL PHARMACY W/ HCPCS (ALT 636 FOR OP/ED)

## 2024-08-26 PROCEDURE — 70450 CT HEAD/BRAIN W/O DYE: CPT | Performed by: RADIOLOGY

## 2024-08-26 PROCEDURE — 93306 TTE W/DOPPLER COMPLETE: CPT

## 2024-08-26 PROCEDURE — 0B9F8ZX DRAINAGE OF RIGHT LOWER LUNG LOBE, VIA NATURAL OR ARTIFICIAL OPENING ENDOSCOPIC, DIAGNOSTIC: ICD-10-PCS | Performed by: SURGERY

## 2024-08-26 PROCEDURE — 85027 COMPLETE CBC AUTOMATED: CPT

## 2024-08-26 PROCEDURE — 2020000001 HC ICU ROOM DAILY

## 2024-08-26 PROCEDURE — 93306 TTE W/DOPPLER COMPLETE: CPT | Performed by: INTERNAL MEDICINE

## 2024-08-26 RX ORDER — 3% SODIUM CHLORIDE 3 G/100ML
30 INJECTION, SOLUTION INTRAVENOUS ONCE
Status: DISCONTINUED | OUTPATIENT
Start: 2024-08-26 | End: 2024-08-26

## 2024-08-26 ASSESSMENT — PAIN - FUNCTIONAL ASSESSMENT

## 2024-08-26 NOTE — CARE PLAN
The clinical goals for the shift include to Remain hemodynamically stable      Problem: Skin  Goal: Decreased wound size/increased tissue granulation at next dressing change  Outcome: Progressing  Goal: Participates in plan/prevention/treatment measures  Outcome: Progressing  Goal: Prevent/manage excess moisture  Outcome: Progressing  Goal: Prevent/minimize sheer/friction injuries  Outcome: Progressing  Goal: Promote/optimize nutrition  Outcome: Progressing  Goal: Promote skin healing  Outcome: Progressing     Problem: Fall/Injury  Goal: Not fall by end of shift  Outcome: Progressing  Goal: Be free from injury by end of the shift  Outcome: Progressing  Goal: Verbalize understanding of personal risk factors for fall in the hospital  Outcome: Progressing  Goal: Verbalize understanding of risk factor reduction measures to prevent injury from fall in the home  Outcome: Progressing  Goal: Use assistive devices by end of the shift  Outcome: Progressing  Goal: Pace activities to prevent fatigue by end of the shift  Outcome: Progressing     Problem: Pain  Goal: Takes deep breaths with improved pain control throughout the shift  Outcome: Progressing  Goal: Turns in bed with improved pain control throughout the shift  Outcome: Progressing  Goal: Walks with improved pain control throughout the shift  Outcome: Progressing  Goal: Performs ADL's with improved pain control throughout shift  Outcome: Progressing  Goal: Participates in PT with improved pain control throughout the shift  Outcome: Progressing  Goal: Free from opioid side effects throughout the shift  Outcome: Progressing  Goal: Free from acute confusion related to pain meds throughout the shift  Outcome: Progressing     Problem: Safety - Medical Restraint  Goal: Remains free of injury from restraints (Restraint for Interference with Medical Device)  Outcome: Progressing  Goal: Free from restraint(s) (Restraint for Interference with Medical Device)  Outcome:  Progressing

## 2024-08-26 NOTE — CONSULTS
"Nutrition Initial Assessment:   Nutrition Assessment    Reason for Assessment: Admission nursing screening, Tube feeding recommendations    Patient is a 78 y.o. male presenting with as a transfer from [facility] after a fall backwards into a chair, + headstrike. Found with R subdural hematoma. S/p hemicraniectomy on 8/24. Intubated and sedated.     Nutrition History:  Food and Nutrient History: Unable to obtain nutrition hx 2/2 intubation + sedation. No family present. OGT in place. Currently running Letsgofordinner 1.5 @ 10ml/hr.  Food Allergies/Intolerances:   shellfish containing products     Anthropometrics:  Height: 175.3 cm (5' 9.02\")   Weight: 144 kg (317 lb 15.9 oz)   BMI (Calculated): 46.94  IBW/kg (Dietitian Calculated): 72.7 kg  Percent of IBW: 198 %     Weight History:   Wt Readings from Last 10 Encounters:   08/25/24 144 kg (317 lb 15.9 oz)   08/24/24 144 kg (318 lb)   10/04/22 128 kg (283 lb)   10/22/21 128 kg (283 lb)   10/19/21 129 kg (285 lb)   10/14/21 113 kg (249 lb)   07/29/21 112 kg (248 lb)   07/16/21 114 kg (251 lb)   07/07/21 114 kg (251 lb)   06/28/21 114 kg (251 lb)   Weight from outside facility on 5/20/24: 148.2kg  Weight from [facility] on 2/6/24: 140.6kg      Weight Change %:  Weight History / % Weight Change: Weight increased by 16kg over the last 2 years.    Nutrition Focused Physical Exam Findings:  Subcutaneous Fat Loss:   Orbital Fat Pads: Well nourished (slightly bulging fat pads)  Buccal Fat Pads: Well nourished (full, rounded cheeks)  Muscle Wasting:  Temporalis: Well nourished (well-defined muscle)  Pectoralis (Clavicular Region): Well nourished (clavicle not visible)  Deltoid/Trapezius: Well nourished (rounded appearance at arm, shoulder, neck)  Quadriceps: Well nourished (well developed, well rounded)  Edema:  Edema: +3 moderate  Edema Location: generalized  Physical Findings:  Skin: Positive (wound to L and R leg, DTPI to L and R ischium, surgical incision to head)    Nutrition " "Significant Labs:  CBC Trend:   Results from last 7 days   Lab Units 08/26/24 0222 08/25/24 2001 08/25/24 0848 08/25/24 0148   WBC AUTO x10*3/uL 13.3* 14.1* 16.0* 21.8*   RBC AUTO x10*6/uL 2.57* 2.61* 2.33* 2.52*   HEMOGLOBIN g/dL 7.4* 7.7* 6.7* 7.2*   HEMATOCRIT % 24.1* 24.3* 21.9* 23.9*   MCV fL 94 93 94 95   PLATELETS AUTO x10*3/uL 165 167 191 203    , Renal Lab Trend:   Results from last 7 days   Lab Units 08/26/24 0222 08/25/24 1631 08/25/24 0148 08/24/24 2201   POTASSIUM mmol/L 4.6 4.9 4.8 4.3   PHOSPHORUS mg/dL 2.4* 2.9 3.3 2.7   SODIUM mmol/L 141 140 143 142   MAGNESIUM mg/dL 2.20 2.32 1.57* 1.81   EGFR mL/min/1.73m*2 61 58* 51* 43*   BUN mg/dL 23 24* 29* 31*   CREATININE mg/dL 1.22 1.28 1.43* 1.63*    , TPN/PPN Labs:   Results from last 7 days   Lab Units 08/26/24 0222 08/25/24 1631 08/25/24 0148 08/24/24 2201 08/24/24 2058 08/24/24 2058   GLUCOSE mg/dL 144* 148* 177* 141*  --  129*   POTASSIUM mmol/L 4.6 4.9 4.8 4.3  --  4.8   PHOSPHORUS mg/dL 2.4* 2.9 3.3 2.7  --  2.7   MAGNESIUM mg/dL 2.20 2.32 1.57* 1.81   < >  --    SODIUM mmol/L 141 140 143 142  --  141   CHLORIDE mmol/L 111* 110* 112* 109*  --  111*   ALT U/L  --   --   --   --   --  12   AST U/L  --   --   --   --   --  32   ALK PHOS U/L  --   --   --   --   --  54   BILIRUBIN TOTAL mg/dL  --   --   --   --   --  0.6    < > = values in this interval not displayed.    , Vit D:   Lab Results   Component Value Date    VITD25 13 (A) 10/04/2022    , Vit B12: No results found for: \"[id number]\"     Nutrition Specific Medications:  Scheduled medications  famotidine, 20 mg, intravenous, q12h RUBÉN  insulin lispro, 0-5 Units, subcutaneous, q4h  levETIRAcetam, 500 mg, intravenous, q12h  oxygen, , inhalation, Continuous - Inhalation  perflutren lipid microspheres, 0.5-10 mL of dilution, intravenous, Once in imaging  perflutren protein A microsphere, 0.5 mL, intravenous, Once in imaging  sulfur hexafluoride microsphr, 2 mL, intravenous, Once in " imaging      Continuous medications  fentaNYL,  mcg/hr, Last Rate: 50 mcg/hr (08/26/24 0331)  norepinephrine, 0.01-0.2 mcg/kg/min, Last Rate: 0.03 mcg/kg/min (08/26/24 0715)  propofol, 5-50 mcg/kg/min, Last Rate: Stopped (08/26/24 0310)  sodium chloride 0.9%, 75 mL/hr, Last Rate: 75 mL/hr (08/26/24 0000)        I/O:   Last BM Date:  (unknown); Stool Appearance: Unable to assess (08/26/24 0400)    Dietary Orders (From admission, onward)       Start     Ordered    08/26/24 0102  Enteral feeding with NPO Isosource 1.5; NG (nasogastric tube); 10  Diet effective now        Question Answer Comment   Tube feeding formula: Isosource 1.5    Tube feeding additive: Pro-Stat AWC    Tube feeding additive frequency: Once a day    Feeding route: NG (nasogastric tube)    Tube feeding continuous rate (mL/hr): 10        08/26/24 0101                     Estimated Needs:   Total Energy Estimated Needs (kCal):  (3711-3881)  Method for Estimating Needs: 11-14kcal/kg CBW (25kcal/kg IBW (+3 edema) = 1818 / PSU REE =2258)  Total Protein Estimated Needs (g): 145 g  Method for Estimating Needs: ~ 2.0g/kg IBW  Total Fluid Estimated Needs (mL):  (per MD/team)  Method for Estimating Needs: per MD /team        Nutrition Diagnosis   Malnutrition Diagnosis  Patient has Malnutrition Diagnosis: No    Nutrition Diagnosis  Patient has Nutrition Diagnosis: Yes  Diagnosis Status (1): New  Nutrition Diagnosis 1: Increased nutrient needs  Related to (1): increased metabolic demand  As Evidenced by (1): s/p hemicranietcomy, critical illness, vent support       Nutrition Interventions/Recommendations         Nutrition Prescription:  Individualized Nutrition Prescription Provided for : enteral nutrition  Replete low electrolytes prior to advancing TF rate   Would start 100mg thiamine as precaution d/t low phos   Change to Pivot 1.5 @ 45ml/hr + 3pkt Prostat daily   Start @ 15ml/hr and by [id number] until goal is met   Start advancement as medically  feasible   FWF per MD/team discretion     *shellfish allergy noted- Pivot contains interesterfied marine oils however is typically well tolerated and is deemed suitable for patients with shellfish allergies         Nutrition Interventions:   Interventions: Enteral intake  Enteral Intake: Modify composition of enteral nutrition  Goal: Pivot 1.5 @ 45ml/hr + 3pkt prostat   TF = 1920kcal, 152gm protein, and 900ml free water      Nutrition Monitoring and Evaluation   Food/Nutrient Related History Monitoring  Monitoring and Evaluation Plan: Enteral and parenteral nutrition intake  Enteral and Parenteral Nutrition Intake: Enteral nutrition intake  Criteria: tolerate TF @ goal         Biochemical Data, Medical Tests and Procedures  Monitoring and Evaluation Plan: Electrolyte/renal panel, Glucose/endocrine profile  Electrolyte and Renal Panel: Magnesium, Phosphorus, Potassium  Criteria: lytes WNL  Glucose/Endocrine Profile: Glucose, casual  Criteria: ICU gluc control 140-180mg/dL              Time Spent (min): 45 minutes

## 2024-08-26 NOTE — PROGRESS NOTES
08/26/24 1131   Discharge Planning   Living Arrangements Spouse/significant other   Support Systems Family members;Spouse/significant other;Friends/neighbors   Assistance Needed IPTA with ADL's   Type of Residence Private residence   Number of Stairs to Enter Residence 3   Number of Stairs Within Residence 13   Who is requesting discharge planning? Provider   Home or Post Acute Services Post acute facilities (Rehab/SNF/etc)   Type of Post Acute Facility Services Rehab   Expected Discharge Disposition IRF   Does the patient need discharge transport arranged? Yes   RoundTrip coordination needed? 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 No     PAYOR: [employer] Medicare     DISCHARGE NEED: LOC TBD by remaining hospital course     BARRIER: none    SOCIAL: Renu (sp) [phone]  Confirmed demographics with spouse via telephone. 3 SEAN through garage, bedroom and bathroom on ground level of home. IPTA- uses walker to ambulate long distances. Patient had 1 fall last week prior to fall that led to admission. [place] [facility] [place], fell the next day and [place] [facility] [place] [facility]   -discussed FMLA ppw with spouse who is employed FT as a nurse.     PCP: Niranjan Ferrari- last visit within 6 months of hospital admission.     DME: walker

## 2024-08-26 NOTE — PROGRESS NOTES
[facility]  TRAUMA ICU - ATTENDING PROGRESS NOTE    Patient Name: Travis Hunt  MRN: [id number]  : 1945  AGE: 78 y.o.   GENDER: male  ==============================================================================    2024  1:01 PM    I evaluated and examined the patient with the critical care team. As a multidisciplinary team, we discussed all findings, as well as assessment and plan. I personally spent 35 minutes of critical care time excluding procedures at the bedside with the patient. I personally reviewed all labs, results and studies. My independent note with assessment and plan are below:    Neurologic:        Severe TBI, SDH with midline shift    Decompressive craniotomy   Repeat head CT planned.  Worsening right frontal edema around IPH   GCS 11T.  Follows on right and only moves toes on left       Analgesia: fentanyl       Sedation: propofol  HEENT:        CHARLIE       C-collar: none  Cardiovascular:        PMHx: CAD, HTN, HLD       Vasopressors: levophed 0.02  Pulmonary:        Acute hypoxic respiratory failure       Oxygen requirement:  CMV 16/500/5/40  Gastrointestinal:        NS@75       Diet: NPO, OG tube until stable head CT       GI prophylaxis: pepcid  Renal/:        RCC stage 3       BPH, flomax       Evans catheter: maintain for accurate I/O  Hematologic:       DVT, PE on coumadin (held and reversed)       Central line: place central line       Arterial line: remove arterial line       DVT prophylaxis: SCD's; lovenox held for worsening head CT  Musculoskeletal:       ICU Mobility Score: 0       Skin breakdown: groin wound, under abdomen, bilateral thigh, unstageable sacral wound present on admission       Restraints: yes, maintain restraints  Endocrine:       CHARLIE       Glucose control <180, POC glucose checks and insulin sliding scale  ID:       CHARLIE       Antibiotics: none    Disposition: The patient remains remains critically ill.    Osmel CLARKE  MD [place]

## 2024-08-26 NOTE — PROGRESS NOTES
"Travis Hunt is a 78 y.o. male on day 2 of admission presenting with Subdural hematoma (Multi).    Subjective   NAEON       Objective     Physical Exam  Sedated on prop and Fentanyl  Off sedation:   ECNS  R FC  LUE min w/d  LLE min spont    Last Recorded Vitals  Blood pressure (!) 124/36, pulse 74, temperature 36.2 °C (97.2 °F), temperature source Temporal, resp. rate 18, height 1.753 m (5' 9.02\"), weight 144 kg (317 lb 15.9 oz), SpO2 100%.  Intake/Output last 3 Shifts:  I/O last 3 completed shifts:  In: 5322.8 (36.9 mL/kg) [I.V.:1662.8 (11.5 mL/kg); Blood:910; IV Piggyback:2750]  Out: 1789 (12.4 mL/kg) [Urine:1194 (0.2 mL/kg/hr); Emesis/NG output:150; Drains:295; Blood:150]  Weight: 144.2 kg     Relevant Results                        Assessment/Plan   Assessment & Plan  Subdural hematoma (Multi)    Stented coronary artery    Pulmonary embolus (Multi)    Irritable bowel syndrome    Anticoagulant long-term use    DVT (deep venous thrombosis) (Multi)    Pt is a 77 yo M w/ h/o HTN, GERD, CAD s/p PCI (on PLX), renal cell carcinoma s/p R nephrectomy, secondary hypoparathyroidism, CKD III, 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

## 2024-08-26 NOTE — PROGRESS NOTES
[facility]  TRAUMA SERVICE - PROGRESS NOTE    Patient Name: Travis Hunt  MRN: [id number]  Admit Date: 824  : 1945  AGE: 78 y.o.   GENDER: male  ==============================================================================  MECHANISM OF INJURY:   79 yo male came to [facility] as a transfer from [facility] after a fall backwards from a chair landing on the back of his head. + Headstrike. Unknown LOC. CTH with large SDH with midline shift and was transferred to [facility] for neurosurgical intervention and ICU admission. 3% NS at OSH. On arrival to [facility] campus, GCS 3. S/p emergent OR for decompressive craniotomy.     LOC (yes/no?): unknown  Anticoagulant / Anti-platelet Rx? 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  R posterior rib fracture of 12th rib unchanged since scan on 22.  Densities around left kidney  Ventral hernia containing bowel segment.   Solid lung nodule in Right lower lobe increased in size from previous exam on 21 and now 0.7 X 0.6 cm    PROCEDURES:  : Right craniotomy for hematoma evacuation    ==============================================================================  TODAY'S ASSESSMENT AND PLAN OF CARE:  79 yo M s/p fall with R SDH on coumadin/ASA/plavix s/p decompressive craniotomy. Repeat CTH with continued blossoming of intraparenchymal hematoma but stable subdural and subarachnoid collections.    - Continue keppra (until )  - Plan for CTH  AM  - Wean levo as tolerated  - NPO until stable CTH  - DVT ppx: held in context of worsening bleed    ==============================================================================  CHIEF COMPLAINT / OVERNIGHT EVENTS:    No acute overnight events.    MEDICAL HISTORY / ROS:  Admission history and ROS reviewed. Pertinent changes as follows:  None    PHYSICAL EXAM:  Heart Rate:  []   Temp:  [35.9 °C (96.6 °F)-36.9 °C (98.4 °F)]   Resp:  [15-32]   BP: (107-170)/(29-62)   SpO2:  [97 %-100 %]   Physical Exam  Constitutional:       Comments: Sedated   Eyes:      Pupils: Pupils are equal, round, and reactive to light.   Cardiovascular:      Rate and Rhythm: Normal rate and regular rhythm.   Pulmonary:      Effort: No respiratory distress.      Comments: Intubated  Abdominal:      General: There is no distension.      Palpations: Abdomen is soft.      Tenderness: There is no abdominal tenderness.   Skin:     General: Skin is warm and dry.   Neurological:      Comments: GCS 11T.  Follows on right and only moves toes on left.   S/p craniotomy         IMAGING SUMMARY:  (summary of new imaging findings, not a copy of dictation)  [facility] 08/26:  1. Interval increase in intraparenchymal hematoma and similar subdural and subarachnoid collections as described above. Interval worsening of right-to-left midline shift now measuring up to 1.1 cm.  2. Persistent vasogenic edema and diffuse sulcal effacement of the right cerebral hemisphere.    LABS:  Results from last 7 days   Lab Units 08/26/24 0222 08/25/24 2001 08/25/24  0848   WBC AUTO x10*3/uL 13.3* 14.1* 16.0*   HEMOGLOBIN g/dL 7.4* 7.7* 6.7*   HEMATOCRIT % 24.1* 24.3* 21.9*   PLATELETS AUTO x10*3/uL 165 167 191     Results from last 7 days   Lab Units 08/26/24 0222   APTT seconds 28   INR  1.1     Results from last 7 days   Lab Units 08/26/24 0222 08/25/24  1631 08/25/24  0148 08/24/24  2201 08/24/24  2058   SODIUM mmol/L 141 140 143   < > 141   POTASSIUM mmol/L 4.6 4.9 4.8   < > 4.8   CHLORIDE mmol/L 111* 110* 112*   < > 111*   CO2 mmol/L 22 22 21   < > 20*   BUN mg/dL 23 24* 29*   < > 31*   CREATININE mg/dL 1.22 1.28 1.43*   < > 1.59*   CALCIUM mg/dL 7.4* 7.8* 7.9*   < > 8.2*   PROTEIN  TOTAL g/dL  --   --   --   --  6.2*   BILIRUBIN TOTAL mg/dL  --   --   --   --  0.6   ALK PHOS U/L  --   --   --   --  54   ALT U/L  --   --   --   --  12   AST U/L  --   --   --   --  32   GLUCOSE mg/dL 144* 148* 177*   < > 129*    < > = values in this interval not displayed.     Results from last 7 days   Lab Units 08/24/24 2058   BILIRUBIN TOTAL mg/dL 0.6     Results from last 7 days   Lab Units 08/26/24  0223 08/25/24  0849 08/25/24  0147   POCT PH, ARTERIAL pH 7.33* 7.31* 7.27*   POCT PCO2, ARTERIAL mm Hg 40 43* 45*   POCT PO2, ARTERIAL mm Hg 120* 132* 118*   POCT HCO3 CALCULATED, ARTERIAL mmol/L 21.1* 21.7* 20.7*   POCT BASE EXCESS, ARTERIAL mmol/L -4.5* -4.2* -6.2*       I have reviewed all medications, laboratory results, and imaging pertinent for today's encounter.

## 2024-08-26 NOTE — PROGRESS NOTES
[facility]  TRAUMA ICU - PROGRESS NOTE     Patient Name: Travis Hunt  MRN: [id number]  Admit Date: 824  : 1945                      AGE: 78 y.o.                             GENDER: male  ==============================================================================     MECHANISM OF INJURY:   77 yo male came to [facility] as a transfer from [facility] after a fall backwards from a chair landing on the back of his head. + Headstrike. Unknown LOC. NIH of 4 at , altered and combative, so he was intubated at outside hospital for airway protection. CT Head found large SDH with midline shift and was transferred to [facility] for neurosurgical intervention and ICU admission. 3% NS at OSH. On arrival to [facility], GCS 3. Sedation turned off. Plan for emergent OR for decompressive craniotomy.     LOC (yes/no?): unknown  Anticoagulant / Anti-platelet Rx? (for what dx?): ASA abd Plavix  Referring Facility Name (N/A for scene EMR run): [facility]     INJURIES:   R subdural hematoma with 1.5 cm Right to left shift     OTHER MEDICAL PROBLEMS:  Prior fracture deformity of L transverse process of L5.  R posterior rib fracture of 12th rib unchanged since scan on 22.     INCIDENTAL FINDINGS:  Densities around left kidney  Ventral hernia containing bowel segment.   Solid lung nodule in Right lower lobe increased in size from previous exam on 21 and now 0.7 X 0.6 cm.     PROCEDURES:  : Right craniotomy for SDH evacuation     ==============================================================================  TODAY'S ASSESSMENT AND PLAN OF CARE:  Travis Hunt is a 78 y.o. male in the ICU due to: SDH after fall on plavix/coumadin.     NEURO/PAIN/SEDATION:   # Severe traumatic brain injury  # Subdural hematoma - s/p decompressive craniotomy  - rCTH: worsening MLS     -> 3% NS 30ml/hr     -> rCTH   - Keppra BID x7 days [-]  - SBP <160  - Does NOT need helmet (has bone flap)  - Repeat  CT head pending  # Acute pain  - Fentanyl gtt while intubated  - Propofol for sedation     RESPIRATORY:   # Intubated on mechanical ventilation  - settings: 18, 450, 5, 40      CARDIOVASC:   # H/o CAD, HLD - s/p coronary stent  # H/o HTN  - Holding home atorvastatin while intubated  # Hemorrhagic shock - s/p PCC and DDAVP  - On levophed 0.01   - MAP goal > 65  - TTE to evaluate for heart failure     GI:  H/o GERD  - Dobhoff   - Continue home famotidine  - Holding home omeprazole     :  # Resolved DARIUSZ Cr 1.22   H/o CKD III in the setting of renal cell carcinoma  H/o BPH  - Holding home tamsulosin  - Evans catheter  - Strict Is & Os     FEN:   - NPO  - NS @ 75 ml/hr pending TTE     HEMATOLOGIC:   # Acute blood loss anemia  - Holding home Plavix and warfarin (reversed with PCC and DDAVP 8/24)  - 2u pRBC this AM, will follow-up with CBC and TEG  - Monitor CBC and TEG  - Hb>7; INR<1.6; plt >100k; fibrinogen >150     ENDOCRINE:   - Q4H POCT glucose + SSI  - Hypoglycemia protocol     MUSCULOSKELETAL/SKIN:   - ICU skin protocol     INFECTIOUS DISEASE:   # Leukocytosis 13.3   - Perioperative Ancef     GI PROPHYLAXIS: home famotidine  DVT PROPHYLAXIS: SCDs, holding pharmacologic iso SDH      DISPOSITION: TICU     This patient was seen and discussed with Dr. Naylor     ==============================================================================  CHIEF COMPLAINT / OVERNIGHT EVENTS / HPI:   No acute events overnight      MEDICAL HISTORY / ROS:  Admission history and ROS reviewed. Pertinent changes as follows: None.     PHYSICAL EXAM:  .Heart Rate:  []   Temp:  [35.9 °C (96.6 °F)-36.9 °C (98.4 °F)]   Resp:  [15-32]   BP: (107-170)/(29-62)   SpO2:  [97 %-100 %]      Physical Exam  Vitals and nursing note reviewed.   Constitutional:       General: He is not in acute distress.     Interventions: He is sedated and intubated.   HENT:      Head:      Comments: Staples in place reflecting s/p R craniotomy; subgaleal drain in  place to bulb suction     Nose: Nose normal.      Mouth/Throat:      Comments: Endotracheal tube in place and secured  Eyes:      Conjunctiva/sclera: Conjunctivae normal.      Pupils: Pupils are equal, round, and reactive to light.   Cardiovascular:      Rate and Rhythm: Normal rate and regular rhythm.      Pulses: Normal pulses.   Pulmonary:      Effort: Pulmonary effort is normal. No respiratory distress. He is intubated.      Comments: Mechanically ventilated on AC VC  Abdominal:      Palpations: Abdomen is soft.   Genitourinary:     Comments: Evans catheter in place.  Musculoskeletal:         General: Swelling present.      Comments: SCDs in place. Significant non-pitting edema to BLE.   Skin:     General: Skin is warm and dry.      Findings: Bruising and lesion present.      Comments: Scattered ecchymoses and bullae on BLE.   Neurological:      Comments: Sedated. Moves RUE and RLE; does not withdraw LUE or LLE from pain.         IMAGING SUMMARY:  CXR: Bilateral atelectasis     I have reviewed all medications, laboratory results, and imaging pertinent for today's encounter.     Zulay Juárez DDS      Case seen and discussed with Dr Naylor

## 2024-08-26 NOTE — PROCEDURES
Central Venous Catheter Insertion Procedure Note        Indications:  vascular access    Procedure Details   Informed consent was obtained for the procedure, including sedation.  Risks of lung perforation, hemorrhage, arrhythmia, and adverse drug reaction were discussed.     Maximum sterile technique was used including antiseptics, cap, gloves, gown, hand hygiene, mask, and sheet.    Under sterile conditions the skin above the on the right internal jugular vein was prepped with betadine and covered with a sterile drape. Local anesthesia was applied to the skin and subcutaneous tissues. A 22-gauge needle was used to identify the vein. An 18-gauge needle was then inserted into the vein. A guide wire was then passed easily through the catheter. There were no arrhythmias. The catheter was then withdrawn. A 7.0 Guamanian triple-lumen was then inserted into the vessel over the guide wire. The catheter was sutured into place.    Findings:  There were no changes to vital signs. Catheter was flushed with 10 cc NS. Patient did tolerate procedure well.    Recommendations:  CXR ordered to verify placement.

## 2024-08-27 ENCOUNTER — APPOINTMENT (OUTPATIENT)
Dept: RADIOLOGY | Facility: HOSPITAL | Age: 79
End: 2024-08-27
Payer: MEDICARE

## 2024-08-27 LAB
ALBUMIN SERPL BCP-MCNC: 2.4 G/DL (ref 3.4–5)
ALBUMIN SERPL BCP-MCNC: 2.4 G/DL (ref 3.4–5)
ALBUMIN SERPL BCP-MCNC: 2.5 G/DL (ref 3.4–5)
ANION GAP BLDA CALCULATED.4IONS-SCNC: 10 MMO/L (ref 10–25)
ANION GAP BLDV CALCULATED.4IONS-SCNC: 9 MMOL/L (ref 10–25)
ANION GAP SERPL CALC-SCNC: 11 MMOL/L (ref 10–20)
ANION GAP SERPL CALC-SCNC: 11 MMOL/L (ref 10–20)
ANION GAP SERPL CALC-SCNC: 13 MMOL/L (ref 10–20)
ANION GAP SERPL CALC-SCNC: 13 MMOL/L (ref 10–20)
ANION GAP SERPL CALC-SCNC: 14 MMOL/L (ref 10–20)
ANION GAP SERPL CALC-SCNC: 14 MMOL/L (ref 10–20)
APTT PPP: 26 SECONDS (ref 27–38)
BASE EXCESS BLDA CALC-SCNC: -2.4 MMOL/L (ref -2–3)
BASE EXCESS BLDV CALC-SCNC: -2.1 MMOL/L (ref -2–3)
BLOOD EXPIRATION DATE: NORMAL
BODY TEMPERATURE: 37 DEGREES CELSIUS
BODY TEMPERATURE: 37 DEGREES CELSIUS
BUN SERPL-MCNC: 21 MG/DL (ref 6–23)
BUN SERPL-MCNC: 22 MG/DL (ref 6–23)
BUN SERPL-MCNC: 23 MG/DL (ref 6–23)
CA-I BLDA-SCNC: 1.2 MMOL/L (ref 1.1–1.33)
CA-I BLDV-SCNC: 1.19 MMOL/L (ref 1.1–1.33)
CALCIUM SERPL-MCNC: 7.4 MG/DL (ref 8.6–10.6)
CALCIUM SERPL-MCNC: 7.5 MG/DL (ref 8.6–10.6)
CALCIUM SERPL-MCNC: 7.5 MG/DL (ref 8.6–10.6)
CALCIUM SERPL-MCNC: 7.7 MG/DL (ref 8.6–10.6)
CHLORIDE BLDA-SCNC: 115 MMOL/L (ref 98–107)
CHLORIDE BLDV-SCNC: 112 MMOL/L (ref 98–107)
CHLORIDE SERPL-SCNC: 113 MMOL/L (ref 98–107)
CHLORIDE SERPL-SCNC: 113 MMOL/L (ref 98–107)
CHLORIDE SERPL-SCNC: 114 MMOL/L (ref 98–107)
CHLORIDE SERPL-SCNC: 114 MMOL/L (ref 98–107)
CHLORIDE SERPL-SCNC: 115 MMOL/L (ref 98–107)
CHLORIDE SERPL-SCNC: 116 MMOL/L (ref 98–107)
CO2 SERPL-SCNC: 20 MMOL/L (ref 21–32)
CO2 SERPL-SCNC: 20 MMOL/L (ref 21–32)
CO2 SERPL-SCNC: 21 MMOL/L (ref 21–32)
CO2 SERPL-SCNC: 22 MMOL/L (ref 21–32)
CO2 SERPL-SCNC: 24 MMOL/L (ref 21–32)
CO2 SERPL-SCNC: 24 MMOL/L (ref 21–32)
CREAT SERPL-MCNC: 1.02 MG/DL (ref 0.5–1.3)
CREAT SERPL-MCNC: 1.02 MG/DL (ref 0.5–1.3)
CREAT SERPL-MCNC: 1.07 MG/DL (ref 0.5–1.3)
CREAT SERPL-MCNC: 1.07 MG/DL (ref 0.5–1.3)
CREAT SERPL-MCNC: 1.09 MG/DL (ref 0.5–1.3)
CREAT SERPL-MCNC: 1.09 MG/DL (ref 0.5–1.3)
DISPENSE STATUS: NORMAL
EGFRCR SERPLBLD CKD-EPI 2021: 70 ML/MIN/1.73M*2
EGFRCR SERPLBLD CKD-EPI 2021: 70 ML/MIN/1.73M*2
EGFRCR SERPLBLD CKD-EPI 2021: 72 ML/MIN/1.73M*2
EGFRCR SERPLBLD CKD-EPI 2021: 72 ML/MIN/1.73M*2
EGFRCR SERPLBLD CKD-EPI 2021: 76 ML/MIN/1.73M*2
EGFRCR SERPLBLD CKD-EPI 2021: 76 ML/MIN/1.73M*2
ERYTHROCYTE [DISTWIDTH] IN BLOOD BY AUTOMATED COUNT: 15.8 % (ref 11.5–14.5)
ERYTHROCYTE [DISTWIDTH] IN BLOOD BY AUTOMATED COUNT: 15.8 % (ref 11.5–14.5)
ERYTHROCYTE [DISTWIDTH] IN BLOOD BY AUTOMATED COUNT: 15.9 % (ref 11.5–14.5)
ERYTHROCYTE [DISTWIDTH] IN BLOOD BY AUTOMATED COUNT: 16.3 % (ref 11.5–14.5)
GLUCOSE BLD MANUAL STRIP-MCNC: 110 MG/DL (ref 74–99)
GLUCOSE BLD MANUAL STRIP-MCNC: 111 MG/DL (ref 74–99)
GLUCOSE BLD MANUAL STRIP-MCNC: 112 MG/DL (ref 74–99)
GLUCOSE BLD MANUAL STRIP-MCNC: 118 MG/DL (ref 74–99)
GLUCOSE BLD MANUAL STRIP-MCNC: 129 MG/DL (ref 74–99)
GLUCOSE BLD MANUAL STRIP-MCNC: 134 MG/DL (ref 74–99)
GLUCOSE BLDA-MCNC: 124 MG/DL (ref 74–99)
GLUCOSE BLDV-MCNC: 124 MG/DL (ref 74–99)
GLUCOSE SERPL-MCNC: 120 MG/DL (ref 74–99)
GLUCOSE SERPL-MCNC: 125 MG/DL (ref 74–99)
GLUCOSE SERPL-MCNC: 126 MG/DL (ref 74–99)
GLUCOSE SERPL-MCNC: 126 MG/DL (ref 74–99)
HCO3 BLDA-SCNC: 20.9 MMOL/L (ref 22–26)
HCO3 BLDV-SCNC: 23.2 MMOL/L (ref 22–26)
HCT VFR BLD AUTO: 20.8 % (ref 41–52)
HCT VFR BLD AUTO: 21.1 % (ref 41–52)
HCT VFR BLD AUTO: 22.3 % (ref 41–52)
HCT VFR BLD AUTO: 22.5 % (ref 41–52)
HCT VFR BLD EST: 17 % (ref 41–52)
HCT VFR BLD EST: 32 % (ref 41–52)
HGB BLD-MCNC: 6.5 G/DL (ref 13.5–17.5)
HGB BLD-MCNC: 7.1 G/DL (ref 13.5–17.5)
HGB BLD-MCNC: 7.1 G/DL (ref 13.5–17.5)
HGB BLD-MCNC: 7.4 G/DL (ref 13.5–17.5)
HGB BLDA-MCNC: 5.8 G/DL (ref 13.5–17.5)
HGB BLDV-MCNC: 10.6 G/DL (ref 13.5–17.5)
HOLD SPECIMEN: NORMAL
INHALED O2 CONCENTRATION: 30 %
INHALED O2 CONCENTRATION: 30 %
INR PPP: 1 (ref 0.9–1.1)
LACTATE BLDA-SCNC: 1 MMOL/L (ref 0.4–2)
LACTATE BLDV-SCNC: 0.8 MMOL/L (ref 0.4–2)
MAGNESIUM SERPL-MCNC: 1.89 MG/DL (ref 1.6–2.4)
MCH RBC QN AUTO: 28.6 PG (ref 26–34)
MCH RBC QN AUTO: 28.7 PG (ref 26–34)
MCH RBC QN AUTO: 28.9 PG (ref 26–34)
MCH RBC QN AUTO: 29.1 PG (ref 26–34)
MCHC RBC AUTO-ENTMCNC: 30.8 G/DL (ref 32–36)
MCHC RBC AUTO-ENTMCNC: 31.6 G/DL (ref 32–36)
MCHC RBC AUTO-ENTMCNC: 33.2 G/DL (ref 32–36)
MCHC RBC AUTO-ENTMCNC: 34.1 G/DL (ref 32–36)
MCV RBC AUTO: 85 FL (ref 80–100)
MCV RBC AUTO: 86 FL (ref 80–100)
MCV RBC AUTO: 91 FL (ref 80–100)
MCV RBC AUTO: 95 FL (ref 80–100)
NRBC BLD-RTO: 0.2 /100 WBCS (ref 0–0)
NRBC BLD-RTO: 0.2 /100 WBCS (ref 0–0)
NRBC BLD-RTO: 0.3 /100 WBCS (ref 0–0)
NRBC BLD-RTO: 0.3 /100 WBCS (ref 0–0)
OSMOLALITY SERPL: 301 MOSM/KG (ref 280–300)
OSMOLALITY SERPL: 302 MOSM/KG (ref 280–300)
OSMOLALITY SERPL: 302 MOSM/KG (ref 280–300)
OXYHGB MFR BLDA: 96.1 % (ref 94–98)
OXYHGB MFR BLDV: 62.8 % (ref 45–75)
PCO2 BLDA: 28 MM HG (ref 38–42)
PCO2 BLDV: 41 MM HG (ref 41–51)
PH BLDA: 7.48 PH (ref 7.38–7.42)
PH BLDV: 7.36 PH (ref 7.33–7.43)
PHOSPHATE SERPL-MCNC: 1.5 MG/DL (ref 2.5–4.9)
PHOSPHATE SERPL-MCNC: 1.7 MG/DL (ref 2.5–4.9)
PHOSPHATE SERPL-MCNC: 2 MG/DL (ref 2.5–4.9)
PLATELET # BLD AUTO: 129 X10*3/UL (ref 150–450)
PLATELET # BLD AUTO: 136 X10*3/UL (ref 150–450)
PLATELET # BLD AUTO: 141 X10*3/UL (ref 150–450)
PLATELET # BLD AUTO: 141 X10*3/UL (ref 150–450)
PO2 BLDA: 84 MM HG (ref 85–95)
PO2 BLDV: 36 MM HG (ref 35–45)
POTASSIUM BLDA-SCNC: 4.3 MMOL/L (ref 3.5–5.3)
POTASSIUM BLDV-SCNC: 4.7 MMOL/L (ref 3.5–5.3)
POTASSIUM SERPL-SCNC: 4.1 MMOL/L (ref 3.5–5.3)
POTASSIUM SERPL-SCNC: 4.2 MMOL/L (ref 3.5–5.3)
POTASSIUM SERPL-SCNC: 4.7 MMOL/L (ref 3.5–5.3)
POTASSIUM SERPL-SCNC: 4.7 MMOL/L (ref 3.5–5.3)
PRODUCT BLOOD TYPE: 6200
PRODUCT CODE: NORMAL
PROTHROMBIN TIME: 11.7 SECONDS (ref 9.8–12.8)
RBC # BLD AUTO: 2.23 X10*6/UL (ref 4.5–5.9)
RBC # BLD AUTO: 2.46 X10*6/UL (ref 4.5–5.9)
RBC # BLD AUTO: 2.47 X10*6/UL (ref 4.5–5.9)
RBC # BLD AUTO: 2.59 X10*6/UL (ref 4.5–5.9)
SAO2 % BLDA: 99 % (ref 94–100)
SAO2 % BLDV: 65 % (ref 45–75)
SODIUM BLDA-SCNC: 142 MMOL/L (ref 136–145)
SODIUM BLDV-SCNC: 139 MMOL/L (ref 136–145)
SODIUM SERPL-SCNC: 143 MMOL/L (ref 136–145)
SODIUM SERPL-SCNC: 143 MMOL/L (ref 136–145)
SODIUM SERPL-SCNC: 144 MMOL/L (ref 136–145)
SODIUM SERPL-SCNC: 144 MMOL/L (ref 136–145)
SODIUM SERPL-SCNC: 146 MMOL/L (ref 136–145)
SODIUM SERPL-SCNC: 146 MMOL/L (ref 136–145)
UNIT ABO: NORMAL
UNIT NUMBER: NORMAL
UNIT RH: NORMAL
UNIT VOLUME: 350
WBC # BLD AUTO: 10 X10*3/UL (ref 4.4–11.3)
WBC # BLD AUTO: 10.1 X10*3/UL (ref 4.4–11.3)
WBC # BLD AUTO: 10.9 X10*3/UL (ref 4.4–11.3)
WBC # BLD AUTO: 9.2 X10*3/UL (ref 4.4–11.3)
XM INTEP: NORMAL

## 2024-08-27 PROCEDURE — 2500000005 HC RX 250 GENERAL PHARMACY W/O HCPCS

## 2024-08-27 PROCEDURE — 85610 PROTHROMBIN TIME: CPT

## 2024-08-27 PROCEDURE — 85027 COMPLETE CBC AUTOMATED: CPT | Performed by: STUDENT IN AN ORGANIZED HEALTH CARE EDUCATION/TRAINING PROGRAM

## 2024-08-27 PROCEDURE — 84295 ASSAY OF SERUM SODIUM: CPT | Performed by: STUDENT IN AN ORGANIZED HEALTH CARE EDUCATION/TRAINING PROGRAM

## 2024-08-27 PROCEDURE — 2500000004 HC RX 250 GENERAL PHARMACY W/ HCPCS (ALT 636 FOR OP/ED)

## 2024-08-27 PROCEDURE — 94799 UNLISTED PULMONARY SVC/PX: CPT

## 2024-08-27 PROCEDURE — 84132 ASSAY OF SERUM POTASSIUM: CPT

## 2024-08-27 PROCEDURE — 37799 UNLISTED PX VASCULAR SURGERY: CPT

## 2024-08-27 PROCEDURE — P9016 RBC LEUKOCYTES REDUCED: HCPCS

## 2024-08-27 PROCEDURE — 83735 ASSAY OF MAGNESIUM: CPT

## 2024-08-27 PROCEDURE — 71045 X-RAY EXAM CHEST 1 VIEW: CPT | Performed by: STUDENT IN AN ORGANIZED HEALTH CARE EDUCATION/TRAINING PROGRAM

## 2024-08-27 PROCEDURE — 94003 VENT MGMT INPAT SUBQ DAY: CPT

## 2024-08-27 PROCEDURE — 82565 ASSAY OF CREATININE: CPT

## 2024-08-27 PROCEDURE — 2580000001 HC RX 258 IV SOLUTIONS: Performed by: STUDENT IN AN ORGANIZED HEALTH CARE EDUCATION/TRAINING PROGRAM

## 2024-08-27 PROCEDURE — 85027 COMPLETE CBC AUTOMATED: CPT

## 2024-08-27 PROCEDURE — 2500000004 HC RX 250 GENERAL PHARMACY W/ HCPCS (ALT 636 FOR OP/ED): Performed by: EMERGENCY MEDICINE

## 2024-08-27 PROCEDURE — 2500000001 HC RX 250 WO HCPCS SELF ADMINISTERED DRUGS (ALT 637 FOR MEDICARE OP): Performed by: PHYSICIAN ASSISTANT

## 2024-08-27 PROCEDURE — 70450 CT HEAD/BRAIN W/O DYE: CPT | Performed by: RADIOLOGY

## 2024-08-27 PROCEDURE — 71045 X-RAY EXAM CHEST 1 VIEW: CPT

## 2024-08-27 PROCEDURE — 2500000005 HC RX 250 GENERAL PHARMACY W/O HCPCS: Performed by: STUDENT IN AN ORGANIZED HEALTH CARE EDUCATION/TRAINING PROGRAM

## 2024-08-27 PROCEDURE — 3E0G76Z INTRODUCTION OF NUTRITIONAL SUBSTANCE INTO UPPER GI, VIA NATURAL OR ARTIFICIAL OPENING: ICD-10-PCS | Performed by: STUDENT IN AN ORGANIZED HEALTH CARE EDUCATION/TRAINING PROGRAM

## 2024-08-27 PROCEDURE — 83930 ASSAY OF BLOOD OSMOLALITY: CPT | Performed by: STUDENT IN AN ORGANIZED HEALTH CARE EDUCATION/TRAINING PROGRAM

## 2024-08-27 PROCEDURE — 36430 TRANSFUSION BLD/BLD COMPNT: CPT

## 2024-08-27 PROCEDURE — 82947 ASSAY GLUCOSE BLOOD QUANT: CPT

## 2024-08-27 PROCEDURE — 2020000001 HC ICU ROOM DAILY

## 2024-08-27 PROCEDURE — 2580000001 HC RX 258 IV SOLUTIONS: Performed by: PHYSICIAN ASSISTANT

## 2024-08-27 RX ORDER — 3% SODIUM CHLORIDE 3 G/100ML
40 INJECTION, SOLUTION INTRAVENOUS ONCE
Status: COMPLETED | OUTPATIENT
Start: 2024-08-27 | End: 2024-08-27

## 2024-08-27 RX ORDER — LIDOCAINE HYDROCHLORIDE 10 MG/ML
10 INJECTION, SOLUTION EPIDURAL; INFILTRATION; INTRACAUDAL; PERINEURAL ONCE
Status: COMPLETED | OUTPATIENT
Start: 2024-08-27 | End: 2024-08-27

## 2024-08-27 RX ORDER — LIDOCAINE HYDROCHLORIDE 10 MG/ML
INJECTION, SOLUTION INFILTRATION; PERINEURAL
Status: DISPENSED
Start: 2024-08-27 | End: 2024-08-28

## 2024-08-27 RX ORDER — ACETAMINOPHEN 325 MG/1
650 TABLET ORAL EVERY 6 HOURS PRN
Status: DISCONTINUED | OUTPATIENT
Start: 2024-08-27 | End: 2024-08-28 | Stop reason: SDUPTHER

## 2024-08-27 RX ORDER — NYSTATIN 100000 [USP'U]/G
1 POWDER TOPICAL 2 TIMES DAILY
Status: DISCONTINUED | OUTPATIENT
Start: 2024-08-27 | End: 2024-09-05

## 2024-08-27 RX ORDER — 3% SODIUM CHLORIDE 3 G/100ML
40 INJECTION, SOLUTION INTRAVENOUS CONTINUOUS
Status: DISCONTINUED | OUTPATIENT
Start: 2024-08-27 | End: 2024-08-28

## 2024-08-27 ASSESSMENT — PAIN - FUNCTIONAL ASSESSMENT
PAIN_FUNCTIONAL_ASSESSMENT: CPOT (CRITICAL CARE PAIN OBSERVATION TOOL)

## 2024-08-27 NOTE — PROGRESS NOTES
[facility]  TRAUMA ICU - ATTENDING PROGRESS NOTE    Patient Name: Travis Hunt  MRN: [id number]  : 1945  AGE: 78 y.o.   GENDER: male  ==============================================================================    2024  11:56 AM    I evaluated and examined the patient with the critical care team. As a multidisciplinary team, we discussed all findings, as well as assessment and plan. I personally spent 35 minutes of critical care time excluding procedures at the bedside with the patient. I personally reviewed all labs, results and studies. My independent note with assessment and plan are below:    Neurologic:        Severe TBI, SDH with midline shift    Decompressive craniotomy   Repeat head CT worsening right frontal edema around IPH   GCS 11T.  Follows on right and only moves toes on left   Goal Na 140-150, 3% NaCl and Q6 hour Na checks   Continue Q1 hour neuro checks       Analgesia: fentanyl       Sedation: propofol  HEENT:        CHARLIE       C-collar: none  Cardiovascular:        PMHx: CAD, HTN, HLD       Vasopressors: none  Pulmonary:        Acute hypoxic respiratory failure       Oxygen requirement:  CMV 16/500/5/40  Gastrointestinal:        NS@75 and 3% at 30       Diet: TF via NG tube, advance to goal       GI prophylaxis: pepcid  Renal/:        RCC stage 3       BPH, flomax       Evans catheter: maintain for accurate I/O  Hematologic:       DVT, PE on coumadin (held and reversed)       Central line: central line       Arterial line: arterial line       DVT prophylaxis: SCD's; lovenox held for worsening head CT.  Plan to start lovenox  AM dose  Musculoskeletal:       ICU Mobility Score: 0       Skin breakdown: groin wound, under abdomen, bilateral thigh, unstageable sacral wound present on admission       Restraints: yes, maintain restraints  Endocrine:       CHARLIE       Glucose control <180, POC glucose checks and insulin sliding scale  ID:       CHARLIE        Antibiotics: none    Disposition: The patient remains remains critically ill.    Osmel Naylor MD

## 2024-08-27 NOTE — PROGRESS NOTES
Physical Therapy                 Therapy Communication Note    Patient Name: Travis Hunt  MRN: [id number]  Today's Date: 8/27/2024     Discipline: Physical Therapy    Missed Visit Reason: Missed Visit Reason:  (0832: Per ID rounds, pt remains not appropriate for mobilization at this time, will hold PT eval.)    Missed Time: Attempt    Dayanara Carr, PT

## 2024-08-27 NOTE — PROGRESS NOTES
[facility]  TRAUMA SERVICE - PROGRESS NOTE    Patient Name: Travis Hunt  MRN: [id number]  Admit Date: 824  : 1945  AGE: 78 y.o.   GENDER: male  ==============================================================================  MECHANISM OF INJURY:   79 yo male came to [facility] as a transfer from [facility] after a fall backwards from a chair landing on the back of his head. + Headstrike. Unknown LOC. CTH with large SDH with midline shift and [place] [facility] [place] admission. 3% NS at OSH. On arrival to [facility], GCS 3. S/p emergent OR for decompressive craniotomy.     LOC (yes/no?): unknown  Anticoagulant / Anti-platelet Rx? (for what dx?): coumadin/ASA, plavix  Referring Facility Name (N/A for scene EMR run): [facility]    INJURIES:   R SDH with 1.5 cm midline shift    OTHER MEDICAL PROBLEMS:  HTN  HLD  CAD  PE/DVTs (on coumadin)  Renal cell carcinoma    INCIDENTAL FINDINGS:  Prior fracture deformity of L transverse process of L5.  R posterior rib fracture of 12th rib unchanged since scan on 22.  Densities around left kidney  Ventral hernia containing bowel segment.   Solid lung nodule in Right lower lobe increased in size from previous exam on 21 and now 0.7 X 0.6 cm    PROCEDURES:  : Right craniotomy for hematoma evacuation    ==============================================================================  TODAY'S ASSESSMENT AND PLAN OF CARE:  79 yo M s/p fall with R SDH on coumadin/ASA/plavix s/p decompressive craniotomy. Repeat CTH with continued blossoming of intraparenchymal hematoma but stable subdural and subarachnoid collections.    - Continue keppra (until )  - Initiate tube feeds and start increasing towards goal  - DVT ppx: to start     ==============================================================================  CHIEF COMPLAINT / OVERNIGHT EVENTS:   Worsening mental status last evening.  Repeat CTH stable.    MEDICAL HISTORY / ROS:  Admission history and ROS reviewed. Pertinent changes as follows:  None    PHYSICAL EXAM:  Heart Rate:  [62-96]   Temp:  [36.6 °C (97.9 °F)-37.3 °C (99.1 °F)]   Resp:  [14-28]   BP: (108-160)/(33-62)   SpO2:  [90 %-100 %]   Physical Exam  Constitutional:       Comments: Sedated   Eyes:      Pupils: Pupils are equal, round, and reactive to light.   Cardiovascular:      Rate and Rhythm: Normal rate and regular rhythm.   Pulmonary:      Effort: No respiratory distress.      Comments: Intubated  Abdominal:      General: There is no distension.      Palpations: Abdomen is soft.      Tenderness: There is no abdominal tenderness.   Skin:     General: Skin is warm and dry.   Neurological:      Comments: GCS 11T.  Follows on right and only moves toes on left.   S/p craniotomy       IMAGING SUMMARY:  (summary of new imaging findings, not a copy of dictation)  CT 08/27:  1. Compared to CT head from 08/26/2024, there is unchanged right frontal lobe hematoma with surrounding edema.  2. Unchanged mass effect of the bilateral frontal horns of the lateral ventricle and a unchanged 1.3 cm leftward shift without central or downward herniation..  3. Unchanged subdural hemorrhage along the bilateral cerebral convexities.  4. Evolving changes of right lateral craniotomy.    LABS:  Results from last 7 days   Lab Units 08/27/24  0521 08/27/24  0100 08/26/24  0222   WBC AUTO x10*3/uL 10.9 10.0 13.3*   HEMOGLOBIN g/dL 7.1* 6.5* 7.4*   HEMATOCRIT % 22.5* 21.1* 24.1*   PLATELETS AUTO x10*3/uL 141* 136* 165     Results from last 7 days   Lab Units 08/27/24  0100 08/26/24  0222   APTT seconds 26* 28   INR  1.0 1.1     Results from last 7 days   Lab Units 08/27/24  0100 08/26/24  1710 08/26/24  0222 08/24/24  2201 08/24/24  2058   SODIUM mmol/L 143  143 140 141   < > 141   POTASSIUM mmol/L 4.7  4.7 4.6 4.6   < > 4.8   CHLORIDE mmol/L 113*  113* 110* 111*   < > 111*   CO2 mmol/L 24  24 23 22   <  > 20*   BUN mg/dL 21  21 22 23   < > 31*   CREATININE mg/dL 1.02  1.02 1.11 1.22   < > 1.59*   CALCIUM mg/dL 7.5*  7.5* 7.5* 7.4*   < > 8.2*   PROTEIN TOTAL g/dL  --   --   --   --  6.2*   BILIRUBIN TOTAL mg/dL  --   --   --   --  0.6   ALK PHOS U/L  --   --   --   --  54   ALT U/L  --   --   --   --  12   AST U/L  --   --   --   --  32   GLUCOSE mg/dL 125*  125* 137* 144*   < > 129*    < > = values in this interval not displayed.     Results from last 7 days   Lab Units 08/24/24 2058   BILIRUBIN TOTAL mg/dL 0.6     Results from last 7 days   Lab Units 08/26/24  0223 08/25/24  0849 08/25/24  0147   POCT PH, ARTERIAL pH 7.33* 7.31* 7.27*   POCT PCO2, ARTERIAL mm Hg 40 43* 45*   POCT PO2, ARTERIAL mm Hg 120* 132* 118*   POCT HCO3 CALCULATED, ARTERIAL mmol/L 21.1* 21.7* 20.7*   POCT BASE EXCESS, ARTERIAL mmol/L -4.5* -4.2* -6.2*       I have reviewed all medications, laboratory results, and imaging pertinent for today's encounter.

## 2024-08-27 NOTE — PROGRESS NOTES
[facility]  TRAUMA ICU - PROGRESS NOTE     Patient Name: Travis Hunt  MRN: [id number]  Admit Date: 824  : 1945                      AGE: 78 y.o.                             GENDER: male  ==============================================================================     MECHANISM OF INJURY:   77 yo male came to [facility] as a transfer from [facility] after a fall backwards from a chair landing on the back of his head. + Headstrike. Unknown LOC. NIH of 4 at , altered and combative, so he was intubated at outside hospital for airway protection. CT Head found large SDH with midline shift and was transferred to [facility] for neurosurgical intervention and ICU admission. 3% NS at OSH. On arrival to [facility], GCS 3. Sedation turned off. Plan for emergent OR for decompressive craniotomy.     LOC (yes/no?): unknown  Anticoagulant / Anti-platelet Rx? (for what dx?): ASA abd Plavix  Referring Facility Name (N/A for scene EMR run): [facility]     INJURIES:   R subdural hematoma with 1.5 cm Right to left shift     OTHER MEDICAL PROBLEMS:  Prior fracture deformity of L transverse process of L5.  R posterior rib fracture of 12th rib unchanged since scan on 22.     INCIDENTAL FINDINGS:  Densities around left kidney  Ventral hernia containing bowel segment.   Solid lung nodule in Right lower lobe increased in size from previous exam on 21 and now 0.7 X 0.6 cm.     PROCEDURES:  : Right craniotomy for SDH evacuation     ==============================================================================  TODAY'S ASSESSMENT AND PLAN OF CARE:  Travis Hunt is a 78 y.o. male in the ICU due to: SDH after fall on plavix/coumadin.     NEURO/PAIN/SEDATION:   # Severe traumatic brain injury  # Subdural hematoma - s/p decompressive craniotomy  - rCTH: worsening MLS ()     -> 3% NS 30ml/hr     -> Na goal 150 (143)     -> rCTH - Stable SDH and IPH, increase in mass effect and MLS   - Keppra  BID x7 days [8/24-9/1]  - SBP <160  - Does NOT need helmet (has bone flap)  - [facility] (8/27) Stable   # Acute pain  - Fentanyl gtt while intubated     RESPIRATORY:   # Intubated on mechanical ventilation  - settings: 18, 450, 5, 40      CARDIOVASC:   # H/o CAD, HLD - s/p coronary stent  # H/o HTN  - Holding home atorvastatin while intubated  # Hemorrhagic shock - s/p PCC and DDAVP  - Off levophed 8/27 AM   - MAP goal > 65  - TTE  EF 65-70%, moderate aortic valve stenosis     GI:  H/o GERD  - Dobhoff   - Continue home famotidine  - Holding home omeprazole     :  # Resolved DARIUSZ Cr 1.02  H/o CKD III in the setting of renal cell carcinoma  H/o BPH  - Holding home tamsulosin  - Evans catheter  - Strict Is & Os     FEN:   - Start trickle feed to goal 45 ml/hr pivot 1.5   - NS @ 75 ml/hr     HEMATOLOGIC:   # Acute blood loss anemia  - Hgb 7.1 from 6.5 ( given 1u RBC 8/27) - INR 1.0   - Holding home Plavix and warfarin (reversed with PCC and DDAVP 8/24)  - Hb>7; INR<1.6; plt >100k; fibrinogen >150     ENDOCRINE:   -    - Q4H POCT glucose + SSI  - Hypoglycemia protocol     MUSCULOSKELETAL/SKIN:   - ICU skin protocol     INFECTIOUS DISEASE:   # Resolved Leukocytosis   - monitor s/s for infection      GI PROPHYLAXIS: home famotidine  DVT PROPHYLAXIS: SCDs, start lovenox 8/29 AM dose     DISPOSITION: TICU     This patient was seen and discussed with Dr. Naylor      ==============================================================================  CHIEF COMPLAINT / OVERNIGHT EVENTS / HPI:   Hgb dropped to 6.5 and was given 1u RBC      MEDICAL HISTORY / ROS:  Admission history and ROS reviewed. Pertinent changes as follows: None.     PHYSICAL EXAM:  .Heart Rate:  []   Temp:  [36.6 °C (97.9 °F)-37.3 °C (99.1 °F)]   Resp:  [14-28]   BP: (108-160)/(33-62)   SpO2:  [90 %-100 %]       Physical Exam  Vitals and nursing note reviewed.   Constitutional:       General: He is not in acute distress.     Interventions: He is sedated  and intubated.   HENT:      Head:      Comments: Staples in place reflecting s/p R craniotomy; subgaleal drain in place to bulb suction     Nose: Nose normal.      Mouth/Throat:      Comments: Endotracheal tube in place and secured  Eyes:      Conjunctiva/sclera: Conjunctivae normal.      Pupils: Pupils are equal, round, and reactive to light.   Cardiovascular:      Rate and Rhythm: Normal rate and regular rhythm.      Pulses: Normal pulses.   Pulmonary:      Effort: Pulmonary effort is normal. No respiratory distress. He is intubated.      Comments: Mechanically ventilated on AC VC  Abdominal:      Palpations: Abdomen is soft.   Genitourinary:     Comments: Evans catheter in place.  Musculoskeletal:         General: Swelling present.      Comments: SCDs in place. Significant non-pitting edema to BLE.   Skin:     General: Skin is warm and dry.      Findings: Bruising and lesion present.      Comments: Scattered ecchymoses and bullae on BLE.   Neurological:      Comments: Sedated. Moves RUE and RLE;  withdraw LUE or LLE from pain.         IMAGING SUMMARY:  CXR: Bilateral atelectasis      I have reviewed all medications, laboratory results, and imaging pertinent for today's encounter.     Zulay Juárez DDS      Case seen and discussed with Dr Naylor

## 2024-08-27 NOTE — SIGNIFICANT EVENT
Patient examined by team (see progress note). Repeat CTH reviewed with stable SDH, MLS, and edema. Drain with low output. Drain was removed at bedside, tolerated well. Long discussion occurred with family regarding drain removal, neurologic status, surgery, and outpatient follow up. Can consider EEG if no improvement in exam or clinical concern for seizure. No acute neurosurgical intervention or additional neuroimaging needed at this time. Continue to hold plavix and coumadin, will discuss restart at follow up visit. Ok for dvt ppx post-op day 2. A 2 week follow up with a CT head will be arranged. Please place in patient's discharge profile when it populates in visit history. Thank you for allowing us to participate in the care of this patient. Will sign off at this time. Please page [phone] with any questions or concerns.

## 2024-08-27 NOTE — PROGRESS NOTES
"Travis Hunt is a 78 y.o. male on day 3 of admission presenting with Subdural hematoma (Multi).    Subjective   NAEON       Objective     Physical Exam  Sedated on prop and Fentanyl  Off sedation:   ECNS  R FC  LUE min w/d  LLE min spont    Last Recorded Vitals  Blood pressure (!) 120/42, pulse 68, temperature 37 °C (98.6 °F), resp. rate 24, height 1.753 m (5' 9.02\"), weight 144 kg (317 lb 15.9 oz), SpO2 96%.  Intake/Output last 3 Shifts:  I/O last 3 completed shifts:  In: 3587.6 (24.9 mL/kg) [I.V.:3037.6 (21.1 mL/kg); Blood:300; NG/GT:50; IV Piggyback:200]  Out: 2821 (19.6 mL/kg) [Urine:2601 (0.5 mL/kg/hr); Emesis/NG output:50; Drains:170]  Weight: 144.2 kg     Relevant Results                        Assessment/Plan   Assessment & Plan  Subdural hematoma (Multi)    Stented coronary artery    Pulmonary embolus (Multi)    Irritable bowel syndrome    Anticoagulant long-term use    DVT (deep venous thrombosis) (Multi)    Pt is a 77 yo M w/ h/o HTN, GERD, CAD s/p PCI (on PLX), renal cell carcinoma s/p R nephrectomy, secondary hypoparathyroidism, CKD III, DVT/PE (on Coumadin), p/w syncopal fall    8/24 s/p R crani for SDH evac, CTH w/ improvement of SDH and MLS, incre RF contusion and SDH, r cTH cont blossoming of RF cont, rrCTH stable    8/26 [facility] table    Plan:  Trauma primary  Will dc SGD today  Hold PLX/Coumadin until follow up   Will arrange two week follow up           Wilma Miramontes MD      "

## 2024-08-28 ENCOUNTER — APPOINTMENT (OUTPATIENT)
Dept: RADIOLOGY | Facility: HOSPITAL | Age: 79
End: 2024-08-28
Payer: MEDICARE

## 2024-08-28 LAB
ALBUMIN SERPL BCP-MCNC: 2.3 G/DL (ref 3.4–5)
ALBUMIN SERPL BCP-MCNC: 2.4 G/DL (ref 3.4–5)
ANION GAP BLDA CALCULATED.4IONS-SCNC: 9 MMO/L (ref 10–25)
ANION GAP SERPL CALC-SCNC: 12 MMOL/L (ref 10–20)
ANION GAP SERPL CALC-SCNC: 13 MMOL/L (ref 10–20)
ANION GAP SERPL CALC-SCNC: 13 MMOL/L (ref 10–20)
BASE EXCESS BLDA CALC-SCNC: -1.5 MMOL/L (ref -2–3)
BODY TEMPERATURE: 37 DEGREES CELSIUS
BUN SERPL-MCNC: 26 MG/DL (ref 6–23)
BUN SERPL-MCNC: 26 MG/DL (ref 6–23)
BUN SERPL-MCNC: 28 MG/DL (ref 6–23)
BUN SERPL-MCNC: 28 MG/DL (ref 6–23)
BUN SERPL-MCNC: 29 MG/DL (ref 6–23)
BUN SERPL-MCNC: 30 MG/DL (ref 6–23)
CA-I BLDA-SCNC: 1.19 MMOL/L (ref 1.1–1.33)
CALCIUM SERPL-MCNC: 7.3 MG/DL (ref 8.6–10.6)
CALCIUM SERPL-MCNC: 7.3 MG/DL (ref 8.6–10.6)
CALCIUM SERPL-MCNC: 7.5 MG/DL (ref 8.6–10.6)
CALCIUM SERPL-MCNC: 7.5 MG/DL (ref 8.6–10.6)
CALCIUM SERPL-MCNC: 7.7 MG/DL (ref 8.6–10.6)
CALCIUM SERPL-MCNC: 7.7 MG/DL (ref 8.6–10.6)
CHLORIDE BLDA-SCNC: 117 MMOL/L (ref 98–107)
CHLORIDE SERPL-SCNC: 118 MMOL/L (ref 98–107)
CHLORIDE SERPL-SCNC: 119 MMOL/L (ref 98–107)
CHLORIDE SERPL-SCNC: 119 MMOL/L (ref 98–107)
CO2 SERPL-SCNC: 21 MMOL/L (ref 21–32)
CO2 SERPL-SCNC: 22 MMOL/L (ref 21–32)
CO2 SERPL-SCNC: 22 MMOL/L (ref 21–32)
CO2 SERPL-SCNC: 23 MMOL/L (ref 21–32)
CREAT SERPL-MCNC: 1.11 MG/DL (ref 0.5–1.3)
CREAT SERPL-MCNC: 1.12 MG/DL (ref 0.5–1.3)
CREAT SERPL-MCNC: 1.15 MG/DL (ref 0.5–1.3)
CREAT SERPL-MCNC: 1.23 MG/DL (ref 0.5–1.3)
EGFRCR SERPLBLD CKD-EPI 2021: 61 ML/MIN/1.73M*2
EGFRCR SERPLBLD CKD-EPI 2021: 66 ML/MIN/1.73M*2
EGFRCR SERPLBLD CKD-EPI 2021: 68 ML/MIN/1.73M*2
EGFRCR SERPLBLD CKD-EPI 2021: 69 ML/MIN/1.73M*2
ERYTHROCYTE [DISTWIDTH] IN BLOOD BY AUTOMATED COUNT: 16.1 % (ref 11.5–14.5)
GLUCOSE BLD MANUAL STRIP-MCNC: 114 MG/DL (ref 74–99)
GLUCOSE BLD MANUAL STRIP-MCNC: 123 MG/DL (ref 74–99)
GLUCOSE BLD MANUAL STRIP-MCNC: 131 MG/DL (ref 74–99)
GLUCOSE BLD MANUAL STRIP-MCNC: 144 MG/DL (ref 74–99)
GLUCOSE BLD MANUAL STRIP-MCNC: 95 MG/DL (ref 74–99)
GLUCOSE BLDA-MCNC: 147 MG/DL (ref 74–99)
GLUCOSE SERPL-MCNC: 120 MG/DL (ref 74–99)
GLUCOSE SERPL-MCNC: 126 MG/DL (ref 74–99)
GLUCOSE SERPL-MCNC: 130 MG/DL (ref 74–99)
GLUCOSE SERPL-MCNC: 143 MG/DL (ref 74–99)
GLUCOSE SERPL-MCNC: 145 MG/DL (ref 74–99)
GLUCOSE SERPL-MCNC: 145 MG/DL (ref 74–99)
HCO3 BLDA-SCNC: 21.6 MMOL/L (ref 22–26)
HCT VFR BLD AUTO: 22.1 % (ref 41–52)
HCT VFR BLD EST: 24 % (ref 41–52)
HGB BLD-MCNC: 7.3 G/DL (ref 13.5–17.5)
HGB BLDA-MCNC: 8.1 G/DL (ref 13.5–17.5)
INHALED O2 CONCENTRATION: 30 %
LACTATE BLDA-SCNC: 0.9 MMOL/L (ref 0.4–2)
MAGNESIUM SERPL-MCNC: 1.94 MG/DL (ref 1.6–2.4)
MAGNESIUM SERPL-MCNC: 1.99 MG/DL (ref 1.6–2.4)
MCH RBC QN AUTO: 28.9 PG (ref 26–34)
MCHC RBC AUTO-ENTMCNC: 33 G/DL (ref 32–36)
MCV RBC AUTO: 87 FL (ref 80–100)
NRBC BLD-RTO: 0.3 /100 WBCS (ref 0–0)
OSMOLALITY SERPL: 305 MOSM/KG (ref 280–300)
OSMOLALITY SERPL: 310 MOSM/KG (ref 280–300)
OSMOLALITY SERPL: 311 MOSM/KG (ref 280–300)
OSMOLALITY SERPL: 312 MOSM/KG (ref 280–300)
OSMOLALITY SERPL: 314 MOSM/KG (ref 280–300)
OXYHGB MFR BLDA: 96.5 % (ref 94–98)
PCO2 BLDA: 29 MM HG (ref 38–42)
PH BLDA: 7.48 PH (ref 7.38–7.42)
PHOSPHATE SERPL-MCNC: 1.5 MG/DL (ref 2.5–4.9)
PHOSPHATE SERPL-MCNC: 1.7 MG/DL (ref 2.5–4.9)
PHOSPHATE SERPL-MCNC: 2.1 MG/DL (ref 2.5–4.9)
PHOSPHATE SERPL-MCNC: 2.6 MG/DL (ref 2.5–4.9)
PHOSPHATE SERPL-MCNC: 2.8 MG/DL (ref 2.5–4.9)
PLATELET # BLD AUTO: 147 X10*3/UL (ref 150–450)
PO2 BLDA: 99 MM HG (ref 85–95)
POTASSIUM BLDA-SCNC: 3.9 MMOL/L (ref 3.5–5.3)
POTASSIUM SERPL-SCNC: 3.6 MMOL/L (ref 3.5–5.3)
POTASSIUM SERPL-SCNC: 3.7 MMOL/L (ref 3.5–5.3)
POTASSIUM SERPL-SCNC: 3.8 MMOL/L (ref 3.5–5.3)
POTASSIUM SERPL-SCNC: 3.9 MMOL/L (ref 3.5–5.3)
POTASSIUM SERPL-SCNC: 4 MMOL/L (ref 3.5–5.3)
POTASSIUM SERPL-SCNC: 4 MMOL/L (ref 3.5–5.3)
RBC # BLD AUTO: 2.53 X10*6/UL (ref 4.5–5.9)
SAO2 % BLDA: 99 % (ref 94–100)
SODIUM BLDA-SCNC: 144 MMOL/L (ref 136–145)
SODIUM SERPL-SCNC: 147 MMOL/L (ref 136–145)
SODIUM SERPL-SCNC: 147 MMOL/L (ref 136–145)
SODIUM SERPL-SCNC: 148 MMOL/L (ref 136–145)
SODIUM SERPL-SCNC: 149 MMOL/L (ref 136–145)
SODIUM SERPL-SCNC: 149 MMOL/L (ref 136–145)
SODIUM SERPL-SCNC: 150 MMOL/L (ref 136–145)
WBC # BLD AUTO: 9.5 X10*3/UL (ref 4.4–11.3)

## 2024-08-28 PROCEDURE — 97167 OT EVAL HIGH COMPLEX 60 MIN: CPT | Mod: GO

## 2024-08-28 PROCEDURE — 94003 VENT MGMT INPAT SUBQ DAY: CPT

## 2024-08-28 PROCEDURE — 74018 RADEX ABDOMEN 1 VIEW: CPT | Performed by: RADIOLOGY

## 2024-08-28 PROCEDURE — 83735 ASSAY OF MAGNESIUM: CPT

## 2024-08-28 PROCEDURE — 2020000001 HC ICU ROOM DAILY

## 2024-08-28 PROCEDURE — 83930 ASSAY OF BLOOD OSMOLALITY: CPT | Performed by: STUDENT IN AN ORGANIZED HEALTH CARE EDUCATION/TRAINING PROGRAM

## 2024-08-28 PROCEDURE — 2500000004 HC RX 250 GENERAL PHARMACY W/ HCPCS (ALT 636 FOR OP/ED)

## 2024-08-28 PROCEDURE — 37799 UNLISTED PX VASCULAR SURGERY: CPT

## 2024-08-28 PROCEDURE — 37799 UNLISTED PX VASCULAR SURGERY: CPT | Performed by: STUDENT IN AN ORGANIZED HEALTH CARE EDUCATION/TRAINING PROGRAM

## 2024-08-28 PROCEDURE — 84132 ASSAY OF SERUM POTASSIUM: CPT

## 2024-08-28 PROCEDURE — 85027 COMPLETE CBC AUTOMATED: CPT

## 2024-08-28 PROCEDURE — 82947 ASSAY GLUCOSE BLOOD QUANT: CPT

## 2024-08-28 PROCEDURE — 2500000005 HC RX 250 GENERAL PHARMACY W/O HCPCS: Performed by: STUDENT IN AN ORGANIZED HEALTH CARE EDUCATION/TRAINING PROGRAM

## 2024-08-28 PROCEDURE — 2500000005 HC RX 250 GENERAL PHARMACY W/O HCPCS

## 2024-08-28 PROCEDURE — 2500000004 HC RX 250 GENERAL PHARMACY W/ HCPCS (ALT 636 FOR OP/ED): Performed by: EMERGENCY MEDICINE

## 2024-08-28 PROCEDURE — 71045 X-RAY EXAM CHEST 1 VIEW: CPT | Performed by: RADIOLOGY

## 2024-08-28 PROCEDURE — 97162 PT EVAL MOD COMPLEX 30 MIN: CPT | Mod: GP

## 2024-08-28 PROCEDURE — 71045 X-RAY EXAM CHEST 1 VIEW: CPT

## 2024-08-28 PROCEDURE — 80048 BASIC METABOLIC PNL TOTAL CA: CPT | Mod: CCI

## 2024-08-28 PROCEDURE — 2580000001 HC RX 258 IV SOLUTIONS: Performed by: PHYSICIAN ASSISTANT

## 2024-08-28 RX ORDER — ACETAMINOPHEN 325 MG/1
650 TABLET ORAL EVERY 6 HOURS
Status: DISCONTINUED | OUTPATIENT
Start: 2024-08-28 | End: 2024-08-29

## 2024-08-28 ASSESSMENT — COGNITIVE AND FUNCTIONAL STATUS - GENERAL
MOBILITY SCORE: 6
CLIMB 3 TO 5 STEPS WITH RAILING: TOTAL
WALKING IN HOSPITAL ROOM: TOTAL
TOILETING: TOTAL
STANDING UP FROM CHAIR USING ARMS: TOTAL
MOVING TO AND FROM BED TO CHAIR: TOTAL
DRESSING REGULAR LOWER BODY CLOTHING: TOTAL
PERSONAL GROOMING: TOTAL
MOVING FROM LYING ON BACK TO SITTING ON SIDE OF FLAT BED WITH BEDRAILS: TOTAL
DAILY ACTIVITIY SCORE: 6
TURNING FROM BACK TO SIDE WHILE IN FLAT BAD: TOTAL
EATING MEALS: TOTAL
DRESSING REGULAR UPPER BODY CLOTHING: TOTAL
HELP NEEDED FOR BATHING: TOTAL

## 2024-08-28 ASSESSMENT — PAIN SCALES - GENERAL
PAINLEVEL_OUTOF10: 0 - NO PAIN
PAINLEVEL_OUTOF10: 0 - NO PAIN

## 2024-08-28 ASSESSMENT — ACTIVITIES OF DAILY LIVING (ADL): BATHING_ASSISTANCE: TOTAL

## 2024-08-28 ASSESSMENT — PAIN - FUNCTIONAL ASSESSMENT
PAIN_FUNCTIONAL_ASSESSMENT: CPOT (CRITICAL CARE PAIN OBSERVATION TOOL)

## 2024-08-28 NOTE — PROGRESS NOTES
Occupational Therapy    Evaluation    Patient Name: Travis Hunt  MRN: [id number]  Today's Date: 8/28/2024  Time Calculation  Start Time: 1054  Stop Time: 1119  Time Calculation (min): 25 min    Assessment  IP OT Assessment  OT Assessment: Patient with decreased command follow and alertness depsite sensory stimuli provided impacting participation in therapy evaluation. Recommend MOD intensity OT services when medically appropriate and continue acute care OT services to increase functional independence.  Prognosis: Fair  Barriers to Discharge: Inaccessible home environment, Other (Comment) (medical acuity)  Evaluation/Treatment Tolerance: Other (Comment) (limited 2/2 alertness/command follow)  End of Session Communication: Bedside nurse  End of Session Patient Position: Bed, 3 rail up (B wrist restraints donned.)  Plan:  Treatment Interventions: ADL retraining, Visual perceptual retraining, Functional transfer training, UE strengthening/ROM, Cognitive reorientation, Patient/family training, Neuromuscular reeducation, Fine motor coordination activities, Endurance training, Equipment evaluation/education, Compensatory technique education, UE splinting, Continued evaluation  OT Frequency: 3 times per week  OT Discharge Recommendations: Moderate intensity level of continued care  OT Recommended Transfer Status: Dependent (in bed activity)  OT - OK to Discharge: Yes (when medically appropriate)    Subjective   Current Problem:  1. Subdural hematoma (Multi)  Case Request Operating Room: CRANIECTOMY OR CRANIOTOMY,SUPRATENTORIAL,EXPLORATORY    Case Request Operating Room: CRANIECTOMY OR CRANIOTOMY,SUPRATENTORIAL,EXPLORATORY    Transthoracic Echo (TTE) Complete    Transthoracic Echo (TTE) Complete    CT head wo IV contrast      2. Nontraumatic subarachnoid hemorrhage from basilar artery (Multi)  Transthoracic Echo (TTE) Complete    Transthoracic Echo (TTE) Complete        General:  General  Reason for Referral: 79 y/o M  presenting with fall backwards out of chair resulting in R subdural hematoma with 1.5 cm (Right to left shift); s/p 8/24: Right craniotomy for SDH evacuation.  Past Medical History Relevant to Rehab: 1. HTN  2. HLD  3. CAD  4. PE/DVTs (on coumadin)  5. Renal cell carcinoma  Family/Caregiver Present: No  Co-Treatment: PT  Co-Treatment Reason: co-treat to maximize patient/caregiver safety with plan for EOB activity and skilled cueing for cognitive deficits.  Prior to Session Communication: Bedside nurse  Patient Position Received: Bed, 3 rail up (B wrist restraints donned.)  General Comment: Per RN, ok to see. Patient supine in bed with sedation on.  Precautions:  Hearing/Visual Limitations: difficult to determine due to poor command follow and decreased sustained eye opening.  Medical Precautions: Fall precautions, Oxygen therapy device and L/min (ETT to vent: VC-AC, 30% FiO2, 16 RR, 5 PEEP)    Vital Sign (Past 2hrs)        Date/Time Vitals Session Patient Position Pulse Resp SpO2 BP MAP (mmHg)               08/28/24 1054 --  --  74  23  98 %  128/45  73                08/28/24 1119 --  --  78  23  98 %  142/48  79                              Pain:  Pain Assessment  Pain Assessment: CPOT (Critical Care Pain Observation Tool)  0-10 (Numeric) Pain Score: 0 - No pain    Objective   Cognition:  Overall Cognitive Status: Impaired  Arousal/Alertness: Inconsistent responses to stimuli  Orientation Level: Unable to assess  Cognition Comments: No yes/no command follow observed. Inconsistent with command follow regarding movement (observed wiggling toes and squeezing R hand to command; however inconsistent). Demo spontaneous movement in 4/4 extremities. No response observed to ice cold sensory stimuli provided to mouth, face and hands.  Processing Speed: Delayed     Patricia Agitation Sedation Scale  Patricia Agitation Sedation Scale (RASS): Light sedation  Home Living:  Type of Home: House  Lives With: Spouse  Home Layout:  One level (3STE)  Home Living Comments: per EMR, lives with wife in house with 3 SEAN through garage, bedroom and bathroom on ground level of home.   Prior Function:  Receives Help From:  (wife works full time as a nurse)  Prior Function Comments: per EMR, pt IND PTA using walker for long distances. (+) fall in the week PTA    ADL:  Eating Assistance: Total  Grooming Assistance: Total  Bathing Assistance: Total  UE Dressing Assistance: Total  LE Dressing Assistance: Total  Toileting Assistance with Device: Total    Bed Mobility/Transfers: Bed Mobility  Bed Mobility: Yes  Bed Mobility 1  Bed Mobility 1: Forward lean  Level of Assistance 1: Dependent, +2  Bed Mobility Comments 1: ~20 seconds with midline head control noted.    Transfers  Transfer: No    Functional Mobility:  Functional Mobility  Functional Mobility Performed: No    Vision:     and Vision - Complex Assessment  Vision Comments: R eye with blink to threat; L eye no blink to threat. No tracking noted in B eyes. No active eye opening noted.  Sensation:  Sensation Comment: Withdraws to painful stim in LUE consistently, RUE inconsistently. Withdraws in BLEs.  Strength:  Strength Comments: spontaneous RUE movement and resistive LUE with attempt for movement and when BP cuff going off. Need further asssessment with increased command follow.    Coordination:  Movements are Fluid and Coordinated: No   Hand Function:  Hand Function  Gross Grasp:  (R functional; L impaired)  Coordination: Impaired  Extremities: RUE   RUE :  (PROM WFL; AROM grasp WFL) and LUE   LUE:  (PROM WFL)    Outcome Measures: [facility] Daily Activity  Putting on and taking off regular lower body clothing: Total  Bathing (including washing, rinsing, drying): Total  Putting on and taking off regular upper body clothing: Total  Toileting, which includes using toilet, bedpan or urinal: Total  Taking care of personal grooming such as brushing teeth: Total  Eating Meals: Total  Daily Activity - Total  Score: 6     Confusion Assessment Method-ICU (CAM-ICU)  Feature 1: Acute Onset or Fluctuating Course: Positive  Feature 2: Inattention: Positive  Feature 3: Altered Level of Consciousness: Positive  Overall CAM-ICU: Positive  Education Documentation  Body Mechanics, taught by Adele Lira OT at 8/28/2024  1:24 PM.  Learner: Patient  Readiness: Acceptance  Method: Explanation  Response: No Evidence of Learning  Comment: Reviewed orientation and reasoning for sensory stimulation provided.    Precautions, taught by Adele Lira OT at 8/28/2024  1:24 PM.  Learner: Patient  Readiness: Acceptance  Method: Explanation  Response: No Evidence of Learning  Comment: Reviewed orientation and reasoning for sensory stimulation provided.    ADL Training, taught by Adele Lira OT at 8/28/2024  1:24 PM.  Learner: Patient  Readiness: Acceptance  Method: Explanation  Response: No Evidence of Learning  Comment: Reviewed orientation and reasoning for sensory stimulation provided.    Education Comments  No comments found.      Goals:   Encounter Problems       Encounter Problems (Active)       ADLs       Patient will complete grooming with MOD assist and min cues.   (Not Progressing)       Start:  08/28/24    Expected End:  09/11/24            Patient will complete UB dressing with MOD assist and min cues.   (Not Progressing)       Start:  08/28/24    Expected End:  09/11/24               BALANCE       Patient will demo sitting balance for at least 8 min with MOD A in prep for sitting ADLs. (Progressing)       Start:  08/28/24    Expected End:  09/11/24               COGNITION/SAFETY       Patient will demo sustained visual attention to at least 4 min with min v/c.  (Not Progressing)       Start:  08/28/24    Expected End:  09/11/24            Patient will be A&Ox4 using external memory strategies as needed and initially cueing only.   (Not Progressing)       Start:  08/28/24    Expected End:  09/11/24                EXERCISE/STRENGTHENING       Patient will demo UE HEP with min v/c.  (Not Progressing)       Start:  08/28/24    Expected End:  09/11/24               MOBILITY       Patient will complete bed mobility with MOD A and min cues.    (Not Progressing)       Start:  08/28/24    Expected End:  09/11/24 08/28/24 at 1:25 PM - Adele Lira, OT

## 2024-08-28 NOTE — PROGRESS NOTES
Physical Therapy    Physical Therapy Evaluation    Patient Name: Travis Hunt  MRN: [id number]  Today's Date: 8/28/2024   Time Calculation  Start Time: 1055  Stop Time: 1119  Time Calculation (min): 24 min    Assessment/Plan   PT Assessment  PT Assessment Results: Decreased strength, Decreased endurance, Impaired balance, Decreased mobility, Decreased coordination, Decreased cognition, Impaired judgement, Decreased safety awareness, Obesity  Rehab Prognosis: Good  Barriers to Discharge: cognitive status  Evaluation/Treatment Tolerance:  (limited by cognition)  Medical Staff Made Aware: Yes  Strengths: Support of Caregivers, Premorbid level of function  Barriers to Participation: Comorbidities, Ability to acquire knowledge  End of Session Communication: Bedside nurse  Assessment Comment: Pt primarily limited by decreased arousal and command following during PT assessment. Pt demo'd inconsistent command following in R ravinder body > L ravinder body. Pt spontaneously moved all limbs during session. Pt will continue to benefit from skilled PT to address deficits.  End of Session Patient Position: Bed, 3 rail up, Alarm off, not on at start of session (bilat soft wrist restraints secured)  IP OR SWING BED PT PLAN  Inpatient or Swing Bed: Inpatient  PT Plan  Treatment/Interventions: Bed mobility, Transfer training, Gait training, Balance training, Strengthening, Endurance training, Range of motion, Therapeutic exercise, Therapeutic activity, Positioning, Postural re-education  PT Plan: Ongoing PT  PT Frequency: 2 times per week  PT Discharge Recommendations: Moderate intensity level of continued care  Equipment Recommended upon Discharge:  (TBD)  PT Recommended Transfer Status: Total assist  PT - OK to Discharge: Yes      Subjective   General Visit Information:  General  Reason for Referral: fall backwards out of chair resulting in R subdural hematoma with 1.5 cm (Right to left shift); s/p 8/24: Right craniotomy for SDH  evacuation  Past Medical History Relevant to Rehab: 1. HTN  2. HLD  3. CAD  4. PE/DVTs (on coumadin)  5. Renal cell carcinoma  Missed Visit: Yes  Missed Visit Reason:  (0832: Per ID rounds, pt remains not appropriate for mobilization at this time, will hold PT eval.)  Family/Caregiver Present: No  Co-Treatment: OT  Co-Treatment Reason: to maximize therapeutic potential 2/2 AMPAC <10  Prior to Session Communication: Bedside nurse  Patient Position Received: Bed, 3 rail up, Alarm off, not on at start of session (bilat soft wrist restraints)  General Comment: Pt supine in bed, on 25 fent and 10 propofol (vent settings: VCAC, 30% FiO2, 16 RR, 8 PEEP)  Home Living:  Home Living  Home Living Comments: per EMR, lives with wife in house with 3 SEAN through garage, bedroom and bathroom on ground level of home.  Prior Level of Function:  Prior Function Per Pt/Caregiver Report  Receives Help From:  (wife works full time as a nurse)  Prior Function Comments: per EMR, pt IPTA using walker for long distances. (+) fall in the week PTA  Precautions:  Precautions  Hearing/Visual Limitations:  (RAVINDER 2/2 mental status)       08/28/24 1055   Vital Signs   Heart Rate 75  (post 82)   Resp 23  (post 23)   SpO2 98 %  (post 99)   BP (!) 128/45  (post 142/48)   MAP (mmHg) 73       Objective   Pain:  Pain Assessment  Pain Assessment: CPOT (Critical Care Pain Observation Tool)  0-10 (Numeric) Pain Score: 0 - No pain  Cognition:  Cognition  Overall Cognitive Status: Impaired  Arousal/Alertness: Inconsistent responses to stimuli  Orientation Level: Unable to assess  Following Commands:  (observed command following with RUE hand squeeze x 2 and wiggling R toes x 1)  Cognition Comments: Pt with inconsistnent command following, observed spontaneous movement in 4/4 limbs with increased resistance to movement in LUE. Pt with (-) blink to threat in L eye but intact in R eye, no tracking observed in bilateral eyes this session.    General  Assessments:    Activity Tolerance  Endurance: Tolerates less than 10 min exercise, no significant change in vital signs  Early Mobility/Exercise Safety Screen: Proceed with mobilization - No exclusion criteria met    Sensation  Sensation Comment: withdraws to noxious stim in LUE consistently, RUE inconsistently. BLE withdraws    Coordination  Movements are Fluid and Coordinated: No    Postural Control  Head Control: able to control head moderately (with L head tilt) during forward lean    Functional Assessments:  Bed Mobility  Bed Mobility: Yes  Bed Mobility 1  Bed Mobility 1: Forward lean  Level of Assistance 1: Dependent (x2)  Bed Mobility Comments 1: ~20 seconds to assess head control            Extremity/Trunk Assessments:  RLE   RLE :  (PROM WFL)  LLE   LLE :  (PROM WFL, slightly resistance into knee flexion)  Outcome Measures:  [facility] Basic Mobility  Turning from your back to your side while in a flat bed without using bedrails: Total  Moving from lying on your back to sitting on the side of a flat bed without using bedrails: Total  Moving to and from bed to chair (including a wheelchair): Total  Standing up from a chair using your arms (e.g. wheelchair or bedside chair): Total  To walk in hospital room: Total  Climbing 3-5 steps with railing: Total  Basic Mobility - Total Score: 6    FSS-ICU  Ambulation: Unable to attempt due to weakness  Rolling: Unable to perform  Sitting: Unable to perform  Transfer Sit-to-Stand: Unable to perform  Transfer Supine-to-Sit: Unable to perform  Total Score: 0      Early Mobility/Exercise Safety Screen: Proceed with mobilization - No exclusion criteria met  ICU Mobility Scale: Sitting in bed, exercises in bed [1]    Encounter Problems       Encounter Problems (Active)       Balance       Pt will sit EOB >5 minutes with maxA x 1       Start:  08/28/24    Expected End:  09/11/24               Mobility       Pt will follow >50% of body centered commands        Start:  08/28/24     Expected End:  09/11/24               PT Transfers       STG - Patient to transfer to and from sit to supine maxA        Start:  08/28/24    Expected End:  09/11/24            STG - Patient will transfer sit to and from stand maxA        Start:  08/28/24    Expected End:  09/11/24               Pain - Adult              Education Documentation  Precautions, taught by Dayanara Carr, PT at 8/28/2024  1:13 PM.  Learner: Patient  Readiness: Nonacceptance  Method: Explanation  Response: No Evidence of Learning    Body Mechanics, taught by Dayanara Carr, PT at 8/28/2024  1:13 PM.  Learner: Patient  Readiness: Nonacceptance  Method: Explanation  Response: No Evidence of Learning    Mobility Training, taught by Daynaara Carr, PT at 8/28/2024  1:13 PM.  Learner: Patient  Readiness: Nonacceptance  Method: Explanation  Response: No Evidence of Learning    Education Comments  No comments found.

## 2024-08-28 NOTE — PROGRESS NOTES
Spiritual Care Visit    Clinical Encounter Type  Visited With: Patient  Routine Visit: Introduction  Continue Visiting: Yes  Referral From: Family  Referral To:     Christianity Encounters  Christianity Needs: Prayer         Sacramental Encounters  Sacrament of Sick-Anointing: Anointed    Patient received the Sacrament of the Anointing of the Sick by Fr. Osmel Armstrong,  Zoroastrian .        Within the Zoroastrian Buddhism the Sacrament of the Sick, also known as the Anointing of the Sick, is a prayer in which we invoke the healing power of God.  Along with Eucharist and Reconciliation it is a repeatable sacrament and should be received by anyone about to undergo surgery, those in recovery and the elderly.  This IS NOT 'Last Rites' nor does the Roman Catholic have such a ritual.  Those who are actively dying should receive the Anointing of the Sick for physical and spiritual healing.

## 2024-08-28 NOTE — PROGRESS NOTES
[facility]  TRAUMA SERVICE - PROGRESS NOTE    Patient Name: Travis Hunt  MRN: [id number]  Admit Date: 824  : 1945  AGE: 78 y.o.   GENDER: male  ==============================================================================  MECHANISM OF INJURY:   77 yo male came to [facility] as a transfer from [facility] after a fall backwards from a chair landing on the back of his head. + Headstrike. Unknown LOC. CTH with large SDH with midline shift and was transferred to [facility] for neurosurgical intervention and ICU admission. 3% NS at OSH. On arrival to [facility] campus, GCS 3. S/p emergent OR for decompressive craniotomy.     LOC (yes/no?): unknown  Anticoagulant / Anti-platelet Rx? (for what dx?): coumadin/ASA, plavix  Referring Facility Name (N/A for scene EMR run): [facility]    INJURIES:   R SDH with 1.5 cm midline shift    OTHER MEDICAL PROBLEMS:  HTN  HLD  CAD  PE/DVTs (on coumadin)  Renal cell carcinoma    INCIDENTAL FINDINGS:  Prior fracture deformity of L transverse process of L5.  R posterior rib fracture of 12th rib unchanged since scan on 22.  Densities around left kidney  Ventral hernia containing bowel segment.   Solid lung nodule in Right lower lobe increased in size from previous exam on 21 and now 0.7 X 0.6 cm    PROCEDURES:  : Right craniotomy for hematoma evacuation    ==============================================================================  TODAY'S ASSESSMENT AND PLAN OF CARE:  77 yo M s/p fall with R SDH on coumadin/ASA/plavix s/p decompressive craniotomy. Repeat CTH with continued blossoming of intraparenchymal hematoma but stable subdural and subarachnoid collections.    Last CT head 24 - stable right frontal hematoma with surrounding edema, stable SDH.    - Continue keppra (until )  - Increase tube feeds towards goal  - DVT ppx: to start     ==============================================================================  CHIEF  COMPLAINT / OVERNIGHT EVENTS:   No acute events overnight. Remains intubated on vent. Moving only right side.    MEDICAL HISTORY / ROS:  Admission history and ROS reviewed. Pertinent changes as follows:  None    PHYSICAL EXAM:  Heart Rate:  [65-89]   Temp:  [36.7 °C (98.1 °F)-38.1 °C (100.6 °F)]   Resp:  [18-33]   BP: (114-177)/(36-98)   SpO2:  [92 %-100 %]   Physical Exam  Constitutional:       Comments: Sedated   Eyes:      Pupils: Pupils are equal, round, and reactive to light.   Cardiovascular:      Rate and Rhythm: Normal rate and regular rhythm.   Pulmonary:      Effort: No respiratory distress.      Comments: Intubated  Abdominal:      General: There is no distension.      Palpations: Abdomen is soft.      Tenderness: There is no abdominal tenderness.   Skin:     General: Skin is warm and dry.   Neurological:      Comments: GCS 11T.  Follows on right and only moves toes on left.   S/p craniotomy       IMAGING SUMMARY:  (summary of new imaging findings, not a copy of dictation)  CXR 8/28 - Stable bibasilar atelectasis/infiltrate. ETT in appropriate position.    AXR 8/28 - OGT in stomach.    LABS:  Results from last 7 days   Lab Units 08/28/24  0220 08/27/24  1748 08/27/24  1357   WBC AUTO x10*3/uL 9.5 9.2 10.1   HEMOGLOBIN g/dL 7.3* 7.4* 7.1*   HEMATOCRIT % 22.1* 22.3* 20.8*   PLATELETS AUTO x10*3/uL 147* 141* 129*     Results from last 7 days   Lab Units 08/27/24  0100 08/26/24  0222   APTT seconds 26* 28   INR  1.0 1.1     Results from last 7 days   Lab Units 08/28/24  1006 08/28/24  0616 08/28/24  0220 08/24/24  2201 08/24/24  2058   SODIUM mmol/L 149* 148* 147*  147*   < > 141   POTASSIUM mmol/L 3.8 3.9 4.0  4.0   < > 4.8   CHLORIDE mmol/L 118* 118* 118*  118*   < > 111*   CO2 mmol/L 23 21 21  21   < > 20*   BUN mg/dL 29* 28* 26*  26*   < > 31*   CREATININE mg/dL 1.15 1.12 1.15  1.15   < > 1.59*   CALCIUM mg/dL 7.5* 7.3* 7.7*  7.7*   < > 8.2*   PROTEIN TOTAL g/dL  --   --   --   --  6.2*   BILIRUBIN  TOTAL mg/dL  --   --   --   --  0.6   ALK PHOS U/L  --   --   --   --  54   ALT U/L  --   --   --   --  12   AST U/L  --   --   --   --  32   GLUCOSE mg/dL 120* 143* 145*  145*   < > 129*    < > = values in this interval not displayed.     Results from last 7 days   Lab Units 08/24/24 2058   BILIRUBIN TOTAL mg/dL 0.6     Results from last 7 days   Lab Units 08/28/24  0220 08/27/24  1356 08/26/24  0223   POCT PH, ARTERIAL pH 7.48* 7.48* 7.33*   POCT PCO2, ARTERIAL mm Hg 29* 28* 40   POCT PO2, ARTERIAL mm Hg 99* 84* 120*   POCT HCO3 CALCULATED, ARTERIAL mmol/L 21.6* 20.9* 21.1*   POCT BASE EXCESS, ARTERIAL mmol/L -1.5 -2.4* -4.5*       I have reviewed all medications, laboratory results, and imaging pertinent for today's encounter.

## 2024-08-28 NOTE — PROGRESS NOTES
[facility]  TRAUMA ICU - PROGRESS NOTE     Patient Name: Travis Hunt  MRN: [id number]  Admit Date: 824  : 1945                      AGE: 78 y.o.                             GENDER: male  ==============================================================================     MECHANISM OF INJURY:   79 yo male came to [facility] as a transfer from [facility] after a fall backwards from a chair landing on the back of his head. + Headstrike. Unknown LOC. NIH of 4 at , altered and combative, so he was intubated at outside hospital for airway protection. CT Head found large SDH with midline shift and was transferred to [facility] for neurosurgical intervention and ICU admission. 3% NS at OSH. On arrival to [facility] campus, GCS 3. Sedation turned off. Plan for emergent OR for decompressive craniotomy.     LOC (yes/no?): unknown  Anticoagulant / Anti-platelet Rx? (for what dx?): ASA abd Plavix  Referring Facility Name (N/A for scene EMR run): [facility]     INJURIES:   R subdural hematoma with 1.5 cm Right to left shift     OTHER MEDICAL PROBLEMS:  Prior fracture deformity of L transverse process of L5.  R posterior rib fracture of 12th rib unchanged since scan on 22.     INCIDENTAL FINDINGS:  Densities around left kidney  Ventral hernia containing bowel segment.   Solid lung nodule in Right lower lobe increased in size from previous exam on 21 and now 0.7 X 0.6 cm.     PROCEDURES:  : Right craniotomy for SDH evacuation     ==============================================================================  TODAY'S ASSESSMENT AND PLAN OF CARE:  Travis Hunt is a 78 y.o. male in the ICU due to: SDH after fall on plavix/coumadin.     NEURO/PAIN/SEDATION:   # Severe traumatic brain injury  # Subdural hematoma - s/p decompressive craniotomy  - rCTH: worsening MLS ()     -> 3% NS 30ml/hr     -> Na goal 150 (148)     -> rCTH  AM - Stable SDH and IPH, increase in mass effect and MLS   -  Keppra BID x7 days [8/24-9/1]  - SBP <160  - Does NOT need helmet (has bone flap)  - [facility] (8/27) Stable   # Acute pain  - Fentanyl gtt while intubated     RESPIRATORY:   # Intubated on mechanical ventilation  - settings: 22, 500, 5, 30      CARDIOVASC:   # H/o CAD, HLD - s/p coronary stent  # H/o HTN  - Holding home atorvastatin while intubated  # Hemorrhagic shock - s/p PCC and DDAVP  - Off levophed 8/27 AM   - MAP goal > 65  - TTE  EF 65-70%, moderate aortic valve stenosis     GI:  H/o GERD  - Dobhoff   - Continue home famotidine  - Holding home omeprazole     :  # Resolved DARIUSZ Cr 1.12  H/o CKD III in the setting of renal cell carcinoma  H/o BPH  - Holding home tamsulosin  - Evans catheter  - Strict Is & Os  - UOP 0.6 ml/kg/hr   - Net neg 1L      FEN:   - Start trickle feed to goal 45 ml/hr pivot 1.5   - NS @ 75 ml/hr     - Hypophosphatemia - repleted      HEMATOLOGIC:   # Acute blood loss anemia  - Hgb 7.3 from 7.4 ( given 1u RBC 8/27)  - Holding home Plavix and warfarin (reversed with PCC and DDAVP 8/24)  - Hb>7; INR<1.6; plt >100k; fibrinogen >150     ENDOCRINE:   -    - Q4H POCT glucose + SSI  - Hypoglycemia protocol     MUSCULOSKELETAL/SKIN:   - ICU skin protocol     INFECTIOUS DISEASE:   # Resolved Leukocytosis   # Febrile 38.1 AM 8/28 most likely central hyperthermia 2/2 TBI   - monitor s/s for infection      GI PROPHYLAXIS: home famotidine  DVT PROPHYLAXIS: SCDs, start lovenox 8/29 AM dose     DISPOSITION: TICU     This patient was seen and discussed with Dr. Naylor      ==============================================================================  CHIEF COMPLAINT / OVERNIGHT EVENTS / HPI:   No acute events overnight      MEDICAL HISTORY / ROS:  Admission history and ROS reviewed. Pertinent changes as follows: None.     PHYSICAL EXAM:  .Heart Rate:  [65-89]   Temp:  [36.7 °C (98.1 °F)-37.8 °C (100.1 °F)]   Resp:  [17-29]   BP: (114-177)/(35-98)   SpO2:  [92 %-98 %]       Physical Exam  Vitals and  nursing note reviewed.   Constitutional:       General: He is not in acute distress.     Interventions: He is sedated and intubated.   HENT:      Head:      Comments: Staples in place reflecting s/p R craniotomy; subgaleal drain in place to bulb suction     Nose: Nose normal.      Mouth/Throat:      Comments: Endotracheal tube in place and secured  Eyes:      Conjunctiva/sclera: Conjunctivae normal.      Pupils: Pupils are equal, round, and reactive to light.   Cardiovascular:      Rate and Rhythm: Normal rate and regular rhythm.      Pulses: Normal pulses.   Pulmonary:      Effort: Pulmonary effort is normal. No respiratory distress. He is intubated.      Comments: Mechanically ventilated on AC VC  Abdominal:      Palpations: Abdomen is soft.   Genitourinary:     Comments: Evans catheter in place.  Musculoskeletal:         General: Swelling present.      Comments: SCDs in place. Significant non-pitting edema to BLE.   Skin:     General: Skin is warm and dry.      Findings: Bruising and lesion present.      Comments: Scattered ecchymoses and bullae on BLE.   Neurological:      Comments: Sedated. Moves RUE and RLE;  withdraw LUE or LLE from pain.         IMAGING SUMMARY:  CXR: Bilateral atelectasis      I have reviewed all medications, laboratory results, and imaging pertinent for today's encounter.     Zulay Juárez DDS      Case seen and discussed with Dr Naylor

## 2024-08-28 NOTE — PROGRESS NOTES
[facility]  TRAUMA ICU - ATTENDING PROGRESS NOTE    Patient Name: Travis Hunt  MRN: [id number]  : 1945  AGE: 78 y.o.   GENDER: male  ==============================================================================    2024  11:53 AM    I evaluated and examined the patient with the critical care team. As a multidisciplinary team, we discussed all findings, as well as assessment and plan. I personally spent 35 minutes of critical care time excluding procedures at the bedside with the patient. I personally reviewed all labs, results and studies. My independent note with assessment and plan are below:    Neurologic:        Severe TBI, SDH with midline shift    Decompressive craniotomy   Repeat head CT worsening right frontal edema around IPH   GCS 11T.  Follows on right and only moves toes on left   Goal Na 140-150, 3% NaCl and Q6 hour Na checks   Continue Q1 hour neuro checks   Scheduled tylenol for fever       Analgesia: fentanyl       Sedation: propofol  HEENT:        CHARLIE       C-collar: none  Cardiovascular:        PMHx: CAD, HTN, HLD       Vasopressors: none  Pulmonary:        Acute hypoxic respiratory failure       Oxygen requirement:  CMV 16/500/5/40  Gastrointestinal:        NS@75 and 3% at 30       Diet: TF via NG tube, advance to goal       GI prophylaxis: pepcid  Renal/:        RCC stage 3       BPH, flomax held       Evans catheter: maintain for accurate I/O  Hematologic:       Acute blood loss anemia, transfuse for hemoglobin >7       DVT, PE on coumadin (held and reversed)       Central line: central line       Arterial line: arterial line       DVT prophylaxis: SCD's; lovenox held for worsening head CT.  Plan to start lovenox  AM dose  Musculoskeletal:       ICU Mobility Score: 0       Skin breakdown: groin wound, under abdomen, bilateral thigh, unstageable sacral wound present on admission       Restraints: yes, maintain restraints  Endocrine:        CHARLIE       Glucose control <180, POC glucose checks and insulin sliding scale  ID:       Fever, likely central.  Low suspicion for infectious fever.  Schedule        Antibiotics: none    Disposition: The patient remains remains critically ill.    Osmel Naylor MD

## 2024-08-29 ENCOUNTER — APPOINTMENT (OUTPATIENT)
Dept: RADIOLOGY | Facility: HOSPITAL | Age: 79
End: 2024-08-29
Payer: MEDICARE

## 2024-08-29 LAB
ABO GROUP (TYPE) IN BLOOD: NORMAL
ALBUMIN SERPL BCP-MCNC: 2.4 G/DL (ref 3.4–5)
ANION GAP BLDA CALCULATED.4IONS-SCNC: 12 MMO/L (ref 10–25)
ANION GAP SERPL CALC-SCNC: 12 MMOL/L (ref 10–20)
ANION GAP SERPL CALC-SCNC: 13 MMOL/L (ref 10–20)
ANTIBODY SCREEN: NORMAL
APTT PPP: 26 SECONDS (ref 27–38)
BASE EXCESS BLDA CALC-SCNC: -2.7 MMOL/L (ref -2–3)
BLOOD EXPIRATION DATE: NORMAL
BLOOD EXPIRATION DATE: NORMAL
BODY TEMPERATURE: 37 DEGREES CELSIUS
BUN SERPL-MCNC: 30 MG/DL (ref 6–23)
BUN SERPL-MCNC: 31 MG/DL (ref 6–23)
CA-I BLDA-SCNC: 1.21 MMOL/L (ref 1.1–1.33)
CALCIUM SERPL-MCNC: 7.5 MG/DL (ref 8.6–10.6)
CALCIUM SERPL-MCNC: 7.6 MG/DL (ref 8.6–10.6)
CHLORIDE BLDA-SCNC: 117 MMOL/L (ref 98–107)
CHLORIDE SERPL-SCNC: 116 MMOL/L (ref 98–107)
CHLORIDE SERPL-SCNC: 119 MMOL/L (ref 98–107)
CO2 SERPL-SCNC: 22 MMOL/L (ref 21–32)
CO2 SERPL-SCNC: 23 MMOL/L (ref 21–32)
CREAT SERPL-MCNC: 1.17 MG/DL (ref 0.5–1.3)
CREAT SERPL-MCNC: 1.22 MG/DL (ref 0.5–1.3)
DISPENSE STATUS: NORMAL
DISPENSE STATUS: NORMAL
EGFRCR SERPLBLD CKD-EPI 2021: 61 ML/MIN/1.73M*2
EGFRCR SERPLBLD CKD-EPI 2021: 65 ML/MIN/1.73M*2
ERYTHROCYTE [DISTWIDTH] IN BLOOD BY AUTOMATED COUNT: 16 % (ref 11.5–14.5)
GLUCOSE BLD MANUAL STRIP-MCNC: 114 MG/DL (ref 74–99)
GLUCOSE BLD MANUAL STRIP-MCNC: 117 MG/DL (ref 74–99)
GLUCOSE BLD MANUAL STRIP-MCNC: 129 MG/DL (ref 74–99)
GLUCOSE BLD MANUAL STRIP-MCNC: 130 MG/DL (ref 74–99)
GLUCOSE BLD MANUAL STRIP-MCNC: 134 MG/DL (ref 74–99)
GLUCOSE BLD MANUAL STRIP-MCNC: 91 MG/DL (ref 74–99)
GLUCOSE BLD MANUAL STRIP-MCNC: 91 MG/DL (ref 74–99)
GLUCOSE BLDA-MCNC: 123 MG/DL (ref 74–99)
GLUCOSE SERPL-MCNC: 129 MG/DL (ref 74–99)
GLUCOSE SERPL-MCNC: 145 MG/DL (ref 74–99)
HCO3 BLDA-SCNC: 21.2 MMOL/L (ref 22–26)
HCT VFR BLD AUTO: 21.7 % (ref 41–52)
HCT VFR BLD EST: 24 % (ref 41–52)
HGB BLD-MCNC: 7.1 G/DL (ref 13.5–17.5)
HGB BLDA-MCNC: 8.1 G/DL (ref 13.5–17.5)
INHALED O2 CONCENTRATION: 30 %
INR PPP: 1.2 (ref 0.9–1.1)
LACTATE BLDA-SCNC: 0.7 MMOL/L (ref 0.4–2)
MAGNESIUM SERPL-MCNC: 1.85 MG/DL (ref 1.6–2.4)
MCH RBC QN AUTO: 29.1 PG (ref 26–34)
MCHC RBC AUTO-ENTMCNC: 32.7 G/DL (ref 32–36)
MCV RBC AUTO: 89 FL (ref 80–100)
NRBC BLD-RTO: 0 /100 WBCS (ref 0–0)
OSMOLALITY SERPL: 312 MOSM/KG (ref 280–300)
OXYHGB MFR BLDA: 95.7 % (ref 94–98)
PCO2 BLDA: 32 MM HG (ref 38–42)
PH BLDA: 7.43 PH (ref 7.38–7.42)
PHOSPHATE SERPL-MCNC: 2.7 MG/DL (ref 2.5–4.9)
PLATELET # BLD AUTO: 152 X10*3/UL (ref 150–450)
PO2 BLDA: 84 MM HG (ref 85–95)
POTASSIUM BLDA-SCNC: 3.6 MMOL/L (ref 3.5–5.3)
POTASSIUM SERPL-SCNC: 3.6 MMOL/L (ref 3.5–5.3)
POTASSIUM SERPL-SCNC: 3.7 MMOL/L (ref 3.5–5.3)
PRODUCT BLOOD TYPE: 6200
PRODUCT BLOOD TYPE: 6200
PRODUCT CODE: NORMAL
PRODUCT CODE: NORMAL
PROTHROMBIN TIME: 13 SECONDS (ref 9.8–12.8)
RBC # BLD AUTO: 2.44 X10*6/UL (ref 4.5–5.9)
RH FACTOR (ANTIGEN D): NORMAL
SAO2 % BLDA: 98 % (ref 94–100)
SODIUM BLDA-SCNC: 147 MMOL/L (ref 136–145)
SODIUM SERPL-SCNC: 147 MMOL/L (ref 136–145)
SODIUM SERPL-SCNC: 150 MMOL/L (ref 136–145)
UNIT ABO: NORMAL
UNIT ABO: NORMAL
UNIT NUMBER: NORMAL
UNIT NUMBER: NORMAL
UNIT RH: NORMAL
UNIT RH: NORMAL
UNIT VOLUME: 350
UNIT VOLUME: 350
WBC # BLD AUTO: 8.5 X10*3/UL (ref 4.4–11.3)
XM INTEP: NORMAL
XM INTEP: NORMAL

## 2024-08-29 PROCEDURE — 2500000001 HC RX 250 WO HCPCS SELF ADMINISTERED DRUGS (ALT 637 FOR MEDICARE OP): Performed by: NURSE PRACTITIONER

## 2024-08-29 PROCEDURE — 71045 X-RAY EXAM CHEST 1 VIEW: CPT

## 2024-08-29 PROCEDURE — 2500000004 HC RX 250 GENERAL PHARMACY W/ HCPCS (ALT 636 FOR OP/ED): Performed by: EMERGENCY MEDICINE

## 2024-08-29 PROCEDURE — 2500000004 HC RX 250 GENERAL PHARMACY W/ HCPCS (ALT 636 FOR OP/ED): Performed by: SURGERY

## 2024-08-29 PROCEDURE — 85027 COMPLETE CBC AUTOMATED: CPT | Performed by: STUDENT IN AN ORGANIZED HEALTH CARE EDUCATION/TRAINING PROGRAM

## 2024-08-29 PROCEDURE — 84132 ASSAY OF SERUM POTASSIUM: CPT | Performed by: STUDENT IN AN ORGANIZED HEALTH CARE EDUCATION/TRAINING PROGRAM

## 2024-08-29 PROCEDURE — 71045 X-RAY EXAM CHEST 1 VIEW: CPT | Performed by: RADIOLOGY

## 2024-08-29 PROCEDURE — 2500000005 HC RX 250 GENERAL PHARMACY W/O HCPCS

## 2024-08-29 PROCEDURE — 83735 ASSAY OF MAGNESIUM: CPT

## 2024-08-29 PROCEDURE — 2500000004 HC RX 250 GENERAL PHARMACY W/ HCPCS (ALT 636 FOR OP/ED): Performed by: NURSE PRACTITIONER

## 2024-08-29 PROCEDURE — 86901 BLOOD TYPING SEROLOGIC RH(D): CPT | Performed by: PHYSICIAN ASSISTANT

## 2024-08-29 PROCEDURE — 82947 ASSAY GLUCOSE BLOOD QUANT: CPT

## 2024-08-29 PROCEDURE — 2500000004 HC RX 250 GENERAL PHARMACY W/ HCPCS (ALT 636 FOR OP/ED): Performed by: STUDENT IN AN ORGANIZED HEALTH CARE EDUCATION/TRAINING PROGRAM

## 2024-08-29 PROCEDURE — 83930 ASSAY OF BLOOD OSMOLALITY: CPT | Performed by: STUDENT IN AN ORGANIZED HEALTH CARE EDUCATION/TRAINING PROGRAM

## 2024-08-29 PROCEDURE — 2500000005 HC RX 250 GENERAL PHARMACY W/O HCPCS: Performed by: SURGERY

## 2024-08-29 PROCEDURE — 2020000001 HC ICU ROOM DAILY

## 2024-08-29 PROCEDURE — 2500000001 HC RX 250 WO HCPCS SELF ADMINISTERED DRUGS (ALT 637 FOR MEDICARE OP): Performed by: PHYSICIAN ASSISTANT

## 2024-08-29 PROCEDURE — 37799 UNLISTED PX VASCULAR SURGERY: CPT

## 2024-08-29 PROCEDURE — 31500 INSERT EMERGENCY AIRWAY: CPT | Performed by: STUDENT IN AN ORGANIZED HEALTH CARE EDUCATION/TRAINING PROGRAM

## 2024-08-29 PROCEDURE — 0BH17EZ INSERTION OF ENDOTRACHEAL AIRWAY INTO TRACHEA, VIA NATURAL OR ARTIFICIAL OPENING: ICD-10-PCS | Performed by: SURGERY

## 2024-08-29 PROCEDURE — 2500000004 HC RX 250 GENERAL PHARMACY W/ HCPCS (ALT 636 FOR OP/ED)

## 2024-08-29 PROCEDURE — 86923 COMPATIBILITY TEST ELECTRIC: CPT

## 2024-08-29 PROCEDURE — 94003 VENT MGMT INPAT SUBQ DAY: CPT

## 2024-08-29 PROCEDURE — 85610 PROTHROMBIN TIME: CPT

## 2024-08-29 PROCEDURE — 2500000005 HC RX 250 GENERAL PHARMACY W/O HCPCS: Performed by: STUDENT IN AN ORGANIZED HEALTH CARE EDUCATION/TRAINING PROGRAM

## 2024-08-29 PROCEDURE — 37799 UNLISTED PX VASCULAR SURGERY: CPT | Performed by: PHYSICIAN ASSISTANT

## 2024-08-29 RX ORDER — FUROSEMIDE 10 MG/ML
40 INJECTION INTRAMUSCULAR; INTRAVENOUS ONCE
Status: COMPLETED | OUTPATIENT
Start: 2024-08-29 | End: 2024-08-29

## 2024-08-29 RX ORDER — ENOXAPARIN SODIUM 100 MG/ML
30 INJECTION SUBCUTANEOUS 2 TIMES DAILY
Status: DISCONTINUED | OUTPATIENT
Start: 2024-08-29 | End: 2024-08-31

## 2024-08-29 RX ORDER — ETOMIDATE 2 MG/ML
20 INJECTION INTRAVENOUS ONCE
Status: COMPLETED | OUTPATIENT
Start: 2024-08-29 | End: 2024-08-29

## 2024-08-29 RX ORDER — POTASSIUM CHLORIDE 29.8 MG/ML
40 INJECTION INTRAVENOUS ONCE
Status: COMPLETED | OUTPATIENT
Start: 2024-08-29 | End: 2024-08-29

## 2024-08-29 RX ORDER — SUCCINYLCHOLINE CHLORIDE 20 MG/ML
100 INJECTION INTRAMUSCULAR; INTRAVENOUS ONCE
Status: COMPLETED | OUTPATIENT
Start: 2024-08-29 | End: 2024-08-29

## 2024-08-29 RX ORDER — POLYETHYLENE GLYCOL 3350 17 G/17G
17 POWDER, FOR SOLUTION ORAL DAILY
Status: DISCONTINUED | OUTPATIENT
Start: 2024-08-29 | End: 2024-09-13 | Stop reason: HOSPADM

## 2024-08-29 RX ORDER — AMOXICILLIN 250 MG
1 CAPSULE ORAL NIGHTLY
Status: DISCONTINUED | OUTPATIENT
Start: 2024-08-29 | End: 2024-09-13 | Stop reason: HOSPADM

## 2024-08-29 RX ORDER — ACETAMINOPHEN 325 MG/1
975 TABLET ORAL ONCE
Status: DISCONTINUED | OUTPATIENT
Start: 2024-08-29 | End: 2024-08-29

## 2024-08-29 RX ORDER — ATORVASTATIN CALCIUM 40 MG/1
40 TABLET, FILM COATED ORAL NIGHTLY
Status: DISCONTINUED | OUTPATIENT
Start: 2024-08-29 | End: 2024-09-13 | Stop reason: HOSPADM

## 2024-08-29 RX ORDER — ACETAMINOPHEN 325 MG/1
975 TABLET ORAL EVERY 6 HOURS PRN
Status: DISCONTINUED | OUTPATIENT
Start: 2024-08-29 | End: 2024-08-30

## 2024-08-29 ASSESSMENT — PAIN - FUNCTIONAL ASSESSMENT
PAIN_FUNCTIONAL_ASSESSMENT: CPOT (CRITICAL CARE PAIN OBSERVATION TOOL)

## 2024-08-29 NOTE — PROGRESS NOTES
[facility]  TRAUMA ICU - PROGRESS NOTE     Patient Name: Travis Hunt  MRN: [id number]  Admit Date: 824  : 1945                      AGE: 78 y.o.                             GENDER: male  ==============================================================================     MECHANISM OF INJURY:   79 yo male came to [facility] as a transfer from [facility] after a fall backwards from a chair landing on the back of his head. + Headstrike. Unknown LOC. NIH of 4 at , altered and combative, so he was intubated at outside hospital for airway protection. CT Head found large SDH with midline shift and was [place] [facility] [place] intervention and ICU admission. 3% NS at OSH. On arrival to [facility], GCS 3. Sedation turned off. Plan for emergent OR for decompressive craniotomy.     LOC (yes/no?): unknown  Anticoagulant / Anti-platelet Rx? (for what dx?): ASA abd Plavix  Referring Facility Name (N/A for scene EMR run): [facility]     INJURIES:   R subdural hematoma with 1.5 cm Right to left shift     OTHER MEDICAL PROBLEMS:  Prior fracture deformity of L transverse process of L5.  R posterior rib fracture of 12th rib unchanged since scan on 22.     INCIDENTAL FINDINGS:  Densities around left kidney  Ventral hernia containing bowel segment.   Solid lung nodule in Right lower lobe increased in size from previous exam on 21 and now 0.7 X 0.6 cm.     PROCEDURES:  : Right craniotomy for SDH evacuation     Tmax 38.3, HR nml, SBP normal, DBP low  16/500/30/5  7.43/32/84/21.2  2245 UOP, 0.9cc/kg/h, BM   Hypernatremia to 150, hyyperchloremia  Hgb 7.1 (stable)  ==============================================================================  TODAY'S ASSESSMENT AND PLAN OF CARE:  Travis Hunt is a 78 y.o. male in the ICU due to: SDH after fall on plavix/coumadin.     NEURO/PAIN/SEDATION:   # Severe traumatic brain injury and SDH s/p decompressive crainiotomy on .  Repeat CT head on 8/27 was stable.  No longer on 3% normal saline.  Sodiums are still elevated.  At this point we will let them drift down on their own.  Patient completed his 7-day course of Keppra.    # Acute pain: Continues on a fentanyl drip while he is intubated.  Also has Tylenol scheduled.  #Agitation.  Patient is on propofol while intubated.  He does get intermittently agitated as evidenced by dyssynchrony with the vent and hypertension.     RESPIRATORY: #Acute respiratory failure secondary to traumatic brain injury.  Currently on AC/VC: 16/500/30/5.  will continue with daily and as needed chest x-rays and ABGs.     CARDIOVASC:   # H/o CAD, HTN, HLD - s/p coronary stent.  Will restart home atorvastatin 40 mg nightly.  Patient does not take medications for blood pressure at home.  We are holding his home Plavix 75 mg daily and Coumadin 5 mg daily 5 days a week and 2.5 mg 2 days a week.     GI: #History of GERD.  Continuing home famotidine 20 mg daily.  Patient also takes omeprazole 20 mg daily.  We are holding this medication at this time.  Patient has a Dobbhoff in place.  He is receiving Pivot 1.5 at 45 cc/h.  He is tolerating this tube feed.  Will start bowel regimen with MiraLAX and senna S.    :   #History of CKD 3 in setting of renal cell carcinoma.  Currently at baseline creatinine around 1.12.  Evans in place for strict I's and O's.  Is voiding 0.9 cc/kg/h.      #History of BPH  Holding home tamsulosin at this point.  Will restart when appropriate       HEMATOLOGIC: # Acute blood loss anemia, stable.  Last received PRBCs on 8/27/2024.  Continue to hold home Plavix and warfarin at this time in setting of head bleed.  Will continue to monitor with daily CBCs.  Will transfuse as appropriate.     ENDOCRINE: No active issues.  Continue with every 4 hour blood glucose checks with sliding scale insulin.  Blood glucose have been between 125 and 177.  Has not received any insulin at this  time.      MUSCULOSKELETAL/SKIN: #Skin tear underlying pannus on the right.  Local wound care.  Patient and is on a specialty bed.  Mepilex to bony prominences.  Continue to help patient turn every 2 hours.     INFECTIOUS DISEASE: # Resolved Leukocytosis.  Patient does have intermittent febrile episodes.  Likely secondary to central hyperthermia.  No antibiotic needs at this time.  Will continue to monitor.     GI PROPHYLAXIS: home famotidine  DVT PROPHYLAXIS: SCDs, start lovenox 8/29 AM dose     DISPOSITION: TICU     This patient was seen and discussed with Dr. Dayday Kirkpatrick, APRN-CNP  Trauma, Critical Care, [facility]  [id number]/ Epic chat     Total face to face time spent with patient/family of 45 minutes, with >50% of the time spent discussing plan of care/management, counseling/educating on disease processes, explaining results of diagnostic testing.        ==============================================================================  CHIEF COMPLAINT / OVERNIGHT EVENTS / HPI:   No acute events overnight      MEDICAL HISTORY / ROS:  Admission history and ROS reviewed. Pertinent changes as follows: None.     PHYSICAL EXAM:  .Heart Rate:  [72-92]   Temp:  [36.3 °C (97.3 °F)-38.3 °C (100.9 °F)]   Resp:  [14-29]   BP: (104-167)/(25-71)   SpO2:  [95 %-100 %]       Physical Exam  Vitals and nursing note reviewed.   Constitutional:       General: He is not in acute distress.     Interventions: He is sedated and intubated.   HENT:      Head:      Comments: Staples in place reflecting s/p R craniotomy; subgaleal drain in place to bulb suction     Nose: Nose normal.      Mouth/Throat:      Comments: Endotracheal tube in place and secured  Eyes:      Conjunctiva/sclera: Conjunctivae normal.      Pupils: Pupils are equal, round, and reactive to light.   Cardiovascular:      Rate and Rhythm: Normal rate and regular rhythm.      Pulses: Normal pulses.   Pulmonary:      Effort: Pulmonary effort is normal. No respiratory  distress. He is intubated.      Comments: Mechanically ventilated on AC VC  Abdominal:      Palpations: Abdomen is soft.   Genitourinary:     Comments: Evans catheter in place.  Musculoskeletal:         General: Swelling present.      Comments: SCDs in place. Significant non-pitting edema to BLE.   Skin:     General: Skin is warm and dry.      Findings: Bruising and lesion present.      Comments: Scattered ecchymoses and bullae on BLE.   Neurological:      Comments: Sedated. Moves RUE and RLE;  withdraw LUE or LLE from pain.

## 2024-08-29 NOTE — PROGRESS NOTES
PLAN:  ICU due to: SDH after fall on plavix/coumadin.   -remains intubated/sedated   -Keppra until 9/1  -TF  -skin, line, ICU tlc     READMISSION? No      PAYOR: O Medicare      DISCHARGE NEED: LOC TBD by remaining hospital course   -unsuccessful attempt to call wife and discuss SNF rec      BARRIER: none     SOCIAL: Renu (sp) [phone]

## 2024-08-29 NOTE — CARE PLAN
The clinical goals for the shift include to Remain hemodynamically stable      Problem: Skin  Goal: Decreased wound size/increased tissue granulation at next dressing change  Outcome: Progressing  Flowsheets (Taken 8/29/2024 0951)  Decreased wound size/increased tissue granulation at next dressing change:   Promote sleep for wound healing   Protective dressings over bony prominences   Utilize specialty bed per algorithm  Goal: Participates in plan/prevention/treatment measures  Outcome: Progressing  Flowsheets (Taken 8/29/2024 0951)  Participates in plan/prevention/treatment measures:   Discuss with provider PT/OT consult   Increase activity/out of bed for meals   Elevate heels  Goal: Prevent/manage excess moisture  Outcome: Progressing  Flowsheets (Taken 8/29/2024 0951)  Prevent/manage excess moisture:   Cleanse incontinence/protect with barrier cream   Monitor for/manage infection if present   Follow provider orders for dressing changes   Moisturize dry skin  Goal: Prevent/minimize sheer/friction injuries  Outcome: Progressing  Flowsheets (Taken 8/29/2024 0951)  Prevent/minimize sheer/friction injuries:   Increase activity/out of bed for meals   Use pull sheet   Complete micro-shifts as needed if patient unable. Adjust patient position to relieve pressure points, not a full turn   Utilize specialty bed per algorithm   Turn/reposition every 2 hours/use positioning/transfer devices   HOB 30 degrees or less  Goal: Promote/optimize nutrition  Outcome: Progressing  Flowsheets (Taken 8/29/2024 0951)  Promote/optimize nutrition:   Assist with feeding   Monitor/record intake including meals   Offer water/supplements/favorite foods   Consume > 50% meals/supplements   Discuss with provider if NPO > 2 days   Reassess MST if dietician not consulted  Goal: Promote skin healing  Outcome: Progressing  Flowsheets (Taken 8/29/2024 0951)  Promote skin healing:   Assess skin/pad under line(s)/device(s)   Protective dressings over  bony prominences   Turn/reposition every 2 hours/use positioning/transfer devices   Ensure correct size (line/device) and apply per  instructions   Rotate device position/do not position patient on device

## 2024-08-29 NOTE — PROCEDURES
Intubation    Date/Time: 8/29/2024 4:46 PM    Performed by: Raysa Fitch MD  Authorized by: Raysa Fitch MD    Consent:     Consent obtained:  Verbal    Consent given by:  Healthcare agent and spouse    Risks, benefits, and alternatives were discussed: yes      Risks discussed:  Aspiration, bleeding, hypoxia and pneumothorax    Alternatives discussed:  Delayed treatment and alternative treatment  Universal protocol:     Procedure explained and questions answered to patient or proxy's satisfaction: yes      Relevant documents present and verified: yes      Test results available: yes      Imaging studies available: yes      Required blood products, implants, devices, and special equipment available: yes      Site/side marked: yes      Immediately prior to procedure, a time out was called: yes      Patient identity confirmed:  Hospital-assigned identification number and arm band  Pre-procedure details:     Indications comment:  Malfunctioning ET tube, need for Tube exchange    Patient status:  Unresponsive    Look externally: no concerns      Mouth opening - incisor distance:  3 or more finger widths    Mallampati score:  I    Obstruction: none      Neck mobility: normal      Pharmacologic strategy: RSI      Induction agents:  Etomidate    Paralytics:  Succinylcholine  Procedure details:     Preoxygenation: ventilator.    CPR in progress: no      Number of attempts:  1  Successful intubation attempt details:     Intubation method:  Oral    Intubation technique: video assisted      Laryngoscope blade:  Hypercurved    Bougie used: yes      Grade view: I      Tube size (mm):  7.5    Tube type:  Cuffed    Tube visualized through cords: yes    Placement assessment:     Tube secured with:  ETT montez    Breath sounds:  Reduced on left    Placement verification: chest rise and colorimetric ETCO2    Post-procedure details:     Procedure completion:  Tolerated well, no immediate complications  Comments:      7.5 ET  tube exchanged over bougie under direct visualization         Raysa Fitch MD   Surgical Critical Care Fellow

## 2024-08-29 NOTE — PROGRESS NOTES
[facility]  TRAUMA SERVICE - PROGRESS NOTE    Patient Name: Travis Hunt  MRN: [id number]  Admit Date: 824  : 1945  AGE: 78 y.o.   GENDER: male  ==============================================================================  MECHANISM OF INJURY:   79 yo male came to [facility] as a transfer from [facility] after a fall backwards from a chair landing on the back of his head. + Headstrike. Unknown LOC. CTH with large SDH with midline shift and was transferred to [facility] for neurosurgical intervention and ICU admission. 3% NS at OSH. On arrival to [facility], GCS 3. S/p emergent OR for decompressive craniotomy.     LOC (yes/no?): unknown  Anticoagulant / Anti-platelet Rx? 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  R posterior rib fracture of 12th rib unchanged since scan on 22.  Densities around left kidney  Ventral hernia containing bowel segment.   Solid lung nodule in Right lower lobe increased in size from previous exam on 21 and now 0.7 X 0.6 cm    PROCEDURES:  : Right craniotomy for hematoma evacuation    ==============================================================================  TODAY'S ASSESSMENT AND PLAN OF CARE:  79 yo M s/p fall with R SDH on coumadin/ASA/plavix s/p decompressive craniotomy. Repeat CTH with continued blossoming of intraparenchymal hematoma but stable subdural and subarachnoid collections. Last CT head 24 - stable right frontal hematoma with surrounding edema, stable SDH.    - Continue keppra (until )  - Increase tube feeds towards goal  - DVT ppx: to start today    Discussed with attending surgeon, Dr. Colon.    Elis Rodney MD  PGY4  General  Surgery   Trauma Surgery    ==============================================================================  CHIEF COMPLAINT / OVERNIGHT EVENTS:   No acute events overnight. Remains intubated on vent. Moving only right side.    MEDICAL HISTORY / ROS:  Admission history and ROS reviewed. Pertinent changes as follows:  None    PHYSICAL EXAM:  Heart Rate:  [72-92]   Temp:  [36.3 °C (97.3 °F)-38.3 °C (100.9 °F)]   Resp:  [14-29]   BP: (104-165)/(25-71)   SpO2:  [95 %-100 %]   Physical Exam  Constitutional:       Comments: Sedated   Eyes:      Pupils: Pupils are equal, round, and reactive to light.   Cardiovascular:      Rate and Rhythm: Normal rate and regular rhythm.   Pulmonary:      Effort: No respiratory distress.      Comments: Intubated  Abdominal:      General: There is no distension.      Palpations: Abdomen is soft.      Tenderness: There is no abdominal tenderness.   Skin:     General: Skin is warm and dry.   Neurological:      Comments: GCS 11T.  Follows on right and only moves toes on left.   S/p craniotomy       IMAGING SUMMARY:  (summary of new imaging findings, not a copy of dictation)  CXR 8/28 - Stable bibasilar atelectasis/infiltrate. ETT in appropriate position.    AXR 8/28 - OGT in stomach.    LABS:  Results from last 7 days   Lab Units 08/29/24  0204 08/28/24  0220 08/27/24  1748   WBC AUTO x10*3/uL 8.5 9.5 9.2   HEMOGLOBIN g/dL 7.1* 7.3* 7.4*   HEMATOCRIT % 21.7* 22.1* 22.3*   PLATELETS AUTO x10*3/uL 152 147* 141*     Results from last 7 days   Lab Units 08/29/24  0204 08/27/24  0100 08/26/24  0222   APTT seconds 26* 26* 28   INR  1.2* 1.0 1.1     Results from last 7 days   Lab Units 08/29/24  0204 08/28/24  1938 08/28/24  1446 08/24/24  2201 08/24/24 2058   SODIUM mmol/L 150* 149* 150*   < > 141   POTASSIUM mmol/L 3.7 3.6 3.7   < > 4.8   CHLORIDE mmol/L 119* 119* 119*   < > 111*   CO2 mmol/L 22 22 22   < > 20*   BUN mg/dL 30* 30* 28*   < > 31*   CREATININE mg/dL 1.17 1.23 1.11   < > 1.59*    CALCIUM mg/dL 7.5* 7.3* 7.5*   < > 8.2*   PROTEIN TOTAL g/dL  --   --   --   --  6.2*   BILIRUBIN TOTAL mg/dL  --   --   --   --  0.6   ALK PHOS U/L  --   --   --   --  54   ALT U/L  --   --   --   --  12   AST U/L  --   --   --   --  32   GLUCOSE mg/dL 129* 130* 126*   < > 129*    < > = values in this interval not displayed.     Results from last 7 days   Lab Units 08/24/24 2058   BILIRUBIN TOTAL mg/dL 0.6     Results from last 7 days   Lab Units 08/29/24  0205 08/28/24  0220 08/27/24  1356   POCT PH, ARTERIAL pH 7.43* 7.48* 7.48*   POCT PCO2, ARTERIAL mm Hg 32* 29* 28*   POCT PO2, ARTERIAL mm Hg 84* 99* 84*   POCT HCO3 CALCULATED, ARTERIAL mmol/L 21.2* 21.6* 20.9*   POCT BASE EXCESS, ARTERIAL mmol/L -2.7* -1.5 -2.4*       I have reviewed all medications, laboratory results, and imaging pertinent for today's encounter.

## 2024-08-30 ENCOUNTER — APPOINTMENT (OUTPATIENT)
Dept: RADIOLOGY | Facility: HOSPITAL | Age: 79
End: 2024-08-30
Payer: MEDICARE

## 2024-08-30 LAB
ALBUMIN SERPL BCP-MCNC: 2.1 G/DL (ref 3.4–5)
ALBUMIN SERPL BCP-MCNC: 2.2 G/DL (ref 3.4–5)
ALBUMIN SERPL BCP-MCNC: 2.5 G/DL (ref 3.4–5)
ANION GAP BLDA CALCULATED.4IONS-SCNC: 10 MMO/L (ref 10–25)
ANION GAP BLDA CALCULATED.4IONS-SCNC: 10 MMO/L (ref 10–25)
ANION GAP BLDA CALCULATED.4IONS-SCNC: 11 MMO/L (ref 10–25)
ANION GAP BLDA CALCULATED.4IONS-SCNC: 8 MMO/L (ref 10–25)
ANION GAP SERPL CALC-SCNC: 13 MMOL/L (ref 10–20)
ANION GAP SERPL CALC-SCNC: 14 MMOL/L (ref 10–20)
ANION GAP SERPL CALC-SCNC: 14 MMOL/L (ref 10–20)
ANION GAP SERPL CALC-SCNC: 15 MMOL/L (ref 10–20)
ANION GAP SERPL CALC-SCNC: 15 MMOL/L (ref 10–20)
APTT PPP: 29 SECONDS (ref 27–38)
BASE EXCESS BLDA CALC-SCNC: -0.1 MMOL/L (ref -2–3)
BASE EXCESS BLDA CALC-SCNC: -2.9 MMOL/L (ref -2–3)
BASE EXCESS BLDA CALC-SCNC: 0.7 MMOL/L (ref -2–3)
BASE EXCESS BLDA CALC-SCNC: 1.6 MMOL/L (ref -2–3)
BODY TEMPERATURE: 37 DEGREES CELSIUS
BUN SERPL-MCNC: 31 MG/DL (ref 6–23)
BUN SERPL-MCNC: 32 MG/DL (ref 6–23)
BUN SERPL-MCNC: 34 MG/DL (ref 6–23)
CA-I BLDA-SCNC: 1.13 MMOL/L (ref 1.1–1.33)
CA-I BLDA-SCNC: 1.13 MMOL/L (ref 1.1–1.33)
CA-I BLDA-SCNC: 1.21 MMOL/L (ref 1.1–1.33)
CA-I BLDA-SCNC: 1.22 MMOL/L (ref 1.1–1.33)
CALCIUM SERPL-MCNC: 7.5 MG/DL (ref 8.6–10.6)
CALCIUM SERPL-MCNC: 7.6 MG/DL (ref 8.6–10.6)
CALCIUM SERPL-MCNC: 8 MG/DL (ref 8.6–10.6)
CHLORIDE BLDA-SCNC: 113 MMOL/L (ref 98–107)
CHLORIDE BLDA-SCNC: 113 MMOL/L (ref 98–107)
CHLORIDE BLDA-SCNC: 114 MMOL/L (ref 98–107)
CHLORIDE BLDA-SCNC: 114 MMOL/L (ref 98–107)
CHLORIDE SERPL-SCNC: 112 MMOL/L (ref 98–107)
CHLORIDE SERPL-SCNC: 113 MMOL/L (ref 98–107)
CO2 SERPL-SCNC: 23 MMOL/L (ref 21–32)
CO2 SERPL-SCNC: 24 MMOL/L (ref 21–32)
CO2 SERPL-SCNC: 25 MMOL/L (ref 21–32)
CREAT SERPL-MCNC: 1.23 MG/DL (ref 0.5–1.3)
CREAT SERPL-MCNC: 1.27 MG/DL (ref 0.5–1.3)
CREAT SERPL-MCNC: 1.31 MG/DL (ref 0.5–1.3)
CREAT SERPL-MCNC: 1.31 MG/DL (ref 0.5–1.3)
CREAT SERPL-MCNC: 1.33 MG/DL (ref 0.5–1.3)
EGFRCR SERPLBLD CKD-EPI 2021: 55 ML/MIN/1.73M*2
EGFRCR SERPLBLD CKD-EPI 2021: 56 ML/MIN/1.73M*2
EGFRCR SERPLBLD CKD-EPI 2021: 56 ML/MIN/1.73M*2
EGFRCR SERPLBLD CKD-EPI 2021: 59 ML/MIN/1.73M*2
EGFRCR SERPLBLD CKD-EPI 2021: 61 ML/MIN/1.73M*2
ERYTHROCYTE [DISTWIDTH] IN BLOOD BY AUTOMATED COUNT: 15.8 % (ref 11.5–14.5)
GLUCOSE BLD MANUAL STRIP-MCNC: 113 MG/DL (ref 74–99)
GLUCOSE BLD MANUAL STRIP-MCNC: 117 MG/DL (ref 74–99)
GLUCOSE BLD MANUAL STRIP-MCNC: 160 MG/DL (ref 74–99)
GLUCOSE BLD MANUAL STRIP-MCNC: 182 MG/DL (ref 74–99)
GLUCOSE BLD MANUAL STRIP-MCNC: 91 MG/DL (ref 74–99)
GLUCOSE BLDA-MCNC: 130 MG/DL (ref 74–99)
GLUCOSE BLDA-MCNC: 140 MG/DL (ref 74–99)
GLUCOSE BLDA-MCNC: 142 MG/DL (ref 74–99)
GLUCOSE BLDA-MCNC: 169 MG/DL (ref 74–99)
GLUCOSE SERPL-MCNC: 119 MG/DL (ref 74–99)
GLUCOSE SERPL-MCNC: 131 MG/DL (ref 74–99)
GLUCOSE SERPL-MCNC: 141 MG/DL (ref 74–99)
GLUCOSE SERPL-MCNC: 146 MG/DL (ref 74–99)
GLUCOSE SERPL-MCNC: 146 MG/DL (ref 74–99)
HCO3 BLDA-SCNC: 21.8 MMOL/L (ref 22–26)
HCO3 BLDA-SCNC: 23.4 MMOL/L (ref 22–26)
HCO3 BLDA-SCNC: 23.5 MMOL/L (ref 22–26)
HCO3 BLDA-SCNC: 23.9 MMOL/L (ref 22–26)
HCT VFR BLD AUTO: 31.8 % (ref 41–52)
HCT VFR BLD EST: 22 % (ref 41–52)
HCT VFR BLD EST: 25 % (ref 41–52)
HCT VFR BLD EST: 29 % (ref 41–52)
HCT VFR BLD EST: 39 % (ref 41–52)
HGB BLD-MCNC: 10.5 G/DL (ref 13.5–17.5)
HGB BLDA-MCNC: 12.9 G/DL (ref 13.5–17.5)
HGB BLDA-MCNC: 7.3 G/DL (ref 13.5–17.5)
HGB BLDA-MCNC: 8.2 G/DL (ref 13.5–17.5)
HGB BLDA-MCNC: 9.8 G/DL (ref 13.5–17.5)
INHALED O2 CONCENTRATION: 100 %
INHALED O2 CONCENTRATION: 30 %
INR PPP: 1.3 (ref 0.9–1.1)
LACTATE BLDA-SCNC: 0.9 MMOL/L (ref 0.4–2)
LACTATE BLDA-SCNC: 1 MMOL/L (ref 0.4–2)
LACTATE BLDA-SCNC: 1 MMOL/L (ref 0.4–2)
LACTATE BLDA-SCNC: 1.2 MMOL/L (ref 0.4–2)
MAGNESIUM SERPL-MCNC: 1.76 MG/DL (ref 1.6–2.4)
MCH RBC QN AUTO: 29.2 PG (ref 26–34)
MCHC RBC AUTO-ENTMCNC: 33 G/DL (ref 32–36)
MCV RBC AUTO: 89 FL (ref 80–100)
MRSA DNA SPEC QL NAA+PROBE: NOT DETECTED
NRBC BLD-RTO: 0 /100 WBCS (ref 0–0)
OXYHGB MFR BLDA: 94.1 % (ref 94–98)
OXYHGB MFR BLDA: 95.4 % (ref 94–98)
OXYHGB MFR BLDA: 96.7 % (ref 94–98)
OXYHGB MFR BLDA: 97.4 % (ref 94–98)
PCO2 BLDA: 30 MM HG (ref 38–42)
PCO2 BLDA: 30 MM HG (ref 38–42)
PCO2 BLDA: 33 MM HG (ref 38–42)
PCO2 BLDA: 36 MM HG (ref 38–42)
PH BLDA: 7.39 PH (ref 7.38–7.42)
PH BLDA: 7.46 PH (ref 7.38–7.42)
PH BLDA: 7.5 PH (ref 7.38–7.42)
PH BLDA: 7.51 PH (ref 7.38–7.42)
PHOSPHATE SERPL-MCNC: 3.2 MG/DL (ref 2.5–4.9)
PHOSPHATE SERPL-MCNC: 3.6 MG/DL (ref 2.5–4.9)
PHOSPHATE SERPL-MCNC: 3.6 MG/DL (ref 2.5–4.9)
PLATELET # BLD AUTO: 130 X10*3/UL (ref 150–450)
PO2 BLDA: 102 MM HG (ref 85–95)
PO2 BLDA: 294 MM HG (ref 85–95)
PO2 BLDA: 77 MM HG (ref 85–95)
PO2 BLDA: 85 MM HG (ref 85–95)
POTASSIUM BLDA-SCNC: 3.7 MMOL/L (ref 3.5–5.3)
POTASSIUM BLDA-SCNC: 3.7 MMOL/L (ref 3.5–5.3)
POTASSIUM BLDA-SCNC: 3.8 MMOL/L (ref 3.5–5.3)
POTASSIUM BLDA-SCNC: 3.9 MMOL/L (ref 3.5–5.3)
POTASSIUM SERPL-SCNC: 3.8 MMOL/L (ref 3.5–5.3)
POTASSIUM SERPL-SCNC: 3.9 MMOL/L (ref 3.5–5.3)
POTASSIUM SERPL-SCNC: 4 MMOL/L (ref 3.5–5.3)
PROTHROMBIN TIME: 14.2 SECONDS (ref 9.8–12.8)
RBC # BLD AUTO: 3.59 X10*6/UL (ref 4.5–5.9)
SAO2 % BLDA: 100 % (ref 94–100)
SAO2 % BLDA: 97 % (ref 94–100)
SAO2 % BLDA: 99 % (ref 94–100)
SAO2 % BLDA: 99 % (ref 94–100)
SODIUM BLDA-SCNC: 139 MMOL/L (ref 136–145)
SODIUM BLDA-SCNC: 144 MMOL/L (ref 136–145)
SODIUM SERPL-SCNC: 145 MMOL/L (ref 136–145)
SODIUM SERPL-SCNC: 146 MMOL/L (ref 136–145)
SODIUM SERPL-SCNC: 147 MMOL/L (ref 136–145)
WBC # BLD AUTO: 9.8 X10*3/UL (ref 4.4–11.3)

## 2024-08-30 PROCEDURE — 85610 PROTHROMBIN TIME: CPT

## 2024-08-30 PROCEDURE — 31622 DX BRONCHOSCOPE/WASH: CPT

## 2024-08-30 PROCEDURE — 85027 COMPLETE CBC AUTOMATED: CPT

## 2024-08-30 PROCEDURE — 2500000004 HC RX 250 GENERAL PHARMACY W/ HCPCS (ALT 636 FOR OP/ED)

## 2024-08-30 PROCEDURE — 2500000001 HC RX 250 WO HCPCS SELF ADMINISTERED DRUGS (ALT 637 FOR MEDICARE OP): Performed by: PHYSICIAN ASSISTANT

## 2024-08-30 PROCEDURE — 71045 X-RAY EXAM CHEST 1 VIEW: CPT

## 2024-08-30 PROCEDURE — 2500000004 HC RX 250 GENERAL PHARMACY W/ HCPCS (ALT 636 FOR OP/ED): Performed by: NURSE PRACTITIONER

## 2024-08-30 PROCEDURE — 71045 X-RAY EXAM CHEST 1 VIEW: CPT | Performed by: RADIOLOGY

## 2024-08-30 PROCEDURE — 84132 ASSAY OF SERUM POTASSIUM: CPT

## 2024-08-30 PROCEDURE — 2500000002 HC RX 250 W HCPCS SELF ADMINISTERED DRUGS (ALT 637 FOR MEDICARE OP, ALT 636 FOR OP/ED)

## 2024-08-30 PROCEDURE — 2500000005 HC RX 250 GENERAL PHARMACY W/O HCPCS

## 2024-08-30 PROCEDURE — 2500000001 HC RX 250 WO HCPCS SELF ADMINISTERED DRUGS (ALT 637 FOR MEDICARE OP): Performed by: NURSE PRACTITIONER

## 2024-08-30 PROCEDURE — 2020000001 HC ICU ROOM DAILY

## 2024-08-30 PROCEDURE — 2500000004 HC RX 250 GENERAL PHARMACY W/ HCPCS (ALT 636 FOR OP/ED): Performed by: EMERGENCY MEDICINE

## 2024-08-30 PROCEDURE — 84132 ASSAY OF SERUM POTASSIUM: CPT | Performed by: STUDENT IN AN ORGANIZED HEALTH CARE EDUCATION/TRAINING PROGRAM

## 2024-08-30 PROCEDURE — 3E043XZ INTRODUCTION OF VASOPRESSOR INTO CENTRAL VEIN, PERCUTANEOUS APPROACH: ICD-10-PCS | Performed by: STUDENT IN AN ORGANIZED HEALTH CARE EDUCATION/TRAINING PROGRAM

## 2024-08-30 PROCEDURE — 36620 INSERTION CATHETER ARTERY: CPT | Performed by: PHYSICIAN ASSISTANT

## 2024-08-30 PROCEDURE — 84295 ASSAY OF SERUM SODIUM: CPT | Performed by: STUDENT IN AN ORGANIZED HEALTH CARE EDUCATION/TRAINING PROGRAM

## 2024-08-30 PROCEDURE — 2500000005 HC RX 250 GENERAL PHARMACY W/O HCPCS: Performed by: STUDENT IN AN ORGANIZED HEALTH CARE EDUCATION/TRAINING PROGRAM

## 2024-08-30 PROCEDURE — 37799 UNLISTED PX VASCULAR SURGERY: CPT | Performed by: STUDENT IN AN ORGANIZED HEALTH CARE EDUCATION/TRAINING PROGRAM

## 2024-08-30 PROCEDURE — 82947 ASSAY GLUCOSE BLOOD QUANT: CPT

## 2024-08-30 PROCEDURE — 94003 VENT MGMT INPAT SUBQ DAY: CPT

## 2024-08-30 PROCEDURE — 2500000004 HC RX 250 GENERAL PHARMACY W/ HCPCS (ALT 636 FOR OP/ED): Performed by: STUDENT IN AN ORGANIZED HEALTH CARE EDUCATION/TRAINING PROGRAM

## 2024-08-30 PROCEDURE — 83735 ASSAY OF MAGNESIUM: CPT

## 2024-08-30 PROCEDURE — 97129 THER IVNTJ 1ST 15 MIN: CPT | Mod: GO

## 2024-08-30 PROCEDURE — 87641 MR-STAPH DNA AMP PROBE: CPT

## 2024-08-30 PROCEDURE — 87205 SMEAR GRAM STAIN: CPT | Performed by: STUDENT IN AN ORGANIZED HEALTH CARE EDUCATION/TRAINING PROGRAM

## 2024-08-30 PROCEDURE — 97130 THER IVNTJ EA ADDL 15 MIN: CPT | Mod: GO

## 2024-08-30 PROCEDURE — 87077 CULTURE AEROBIC IDENTIFY: CPT | Performed by: PHYSICIAN ASSISTANT

## 2024-08-30 RX ORDER — MAGNESIUM SULFATE HEPTAHYDRATE 40 MG/ML
2 INJECTION, SOLUTION INTRAVENOUS ONCE
Status: COMPLETED | OUTPATIENT
Start: 2024-08-30 | End: 2024-08-30

## 2024-08-30 RX ORDER — NOREPINEPHRINE BITARTRATE/D5W 8 MG/250ML
PLASTIC BAG, INJECTION (ML) INTRAVENOUS
Status: COMPLETED
Start: 2024-08-30 | End: 2024-08-30

## 2024-08-30 RX ORDER — ACETAMINOPHEN 325 MG/1
650 TABLET ORAL ONCE
Status: DISCONTINUED | OUTPATIENT
Start: 2024-08-30 | End: 2024-08-30

## 2024-08-30 RX ORDER — ETOMIDATE 2 MG/ML
20 INJECTION INTRAVENOUS ONCE
Status: COMPLETED | OUTPATIENT
Start: 2024-08-30 | End: 2024-08-30

## 2024-08-30 RX ORDER — VANCOMYCIN HYDROCHLORIDE 1 G/20ML
INJECTION, POWDER, LYOPHILIZED, FOR SOLUTION INTRAVENOUS DAILY PRN
Status: DISCONTINUED | OUTPATIENT
Start: 2024-08-30 | End: 2024-08-31 | Stop reason: ALTCHOICE

## 2024-08-30 RX ORDER — NOREPINEPHRINE BITARTRATE/D5W 8 MG/250ML
0-.5 PLASTIC BAG, INJECTION (ML) INTRAVENOUS CONTINUOUS
Status: DISCONTINUED | OUTPATIENT
Start: 2024-08-30 | End: 2024-09-04

## 2024-08-30 RX ORDER — SODIUM CHLORIDE 9 MG/ML
50 INJECTION, SOLUTION INTRAVENOUS CONTINUOUS
Status: DISCONTINUED | OUTPATIENT
Start: 2024-08-30 | End: 2024-09-01

## 2024-08-30 RX ORDER — ACETAMINOPHEN 160 MG/5ML
650 SOLUTION ORAL EVERY 4 HOURS PRN
Status: DISCONTINUED | OUTPATIENT
Start: 2024-08-30 | End: 2024-09-13 | Stop reason: HOSPADM

## 2024-08-30 RX ORDER — CEFEPIME 1 G/50ML
1 INJECTION, SOLUTION INTRAVENOUS EVERY 8 HOURS
Status: DISCONTINUED | OUTPATIENT
Start: 2024-08-30 | End: 2024-09-04

## 2024-08-30 RX ORDER — POTASSIUM CHLORIDE 14.9 MG/ML
20 INJECTION INTRAVENOUS ONCE
Status: COMPLETED | OUTPATIENT
Start: 2024-08-30 | End: 2024-08-30

## 2024-08-30 ASSESSMENT — COGNITIVE AND FUNCTIONAL STATUS - GENERAL
EATING MEALS: TOTAL
PERSONAL GROOMING: TOTAL
DRESSING REGULAR LOWER BODY CLOTHING: TOTAL
DAILY ACTIVITIY SCORE: 6
TOILETING: TOTAL
DRESSING REGULAR UPPER BODY CLOTHING: TOTAL
HELP NEEDED FOR BATHING: TOTAL

## 2024-08-30 ASSESSMENT — PAIN - FUNCTIONAL ASSESSMENT
PAIN_FUNCTIONAL_ASSESSMENT: CPOT (CRITICAL CARE PAIN OBSERVATION TOOL)

## 2024-08-30 ASSESSMENT — PAIN SCALES - GENERAL
PAINLEVEL_OUTOF10: 0 - NO PAIN
PAINLEVEL_OUTOF10: 0 - NO PAIN

## 2024-08-30 NOTE — PROGRESS NOTES
[facility]  TRAUMA ICU - ATTENDING PROGRESS NOTE    Patient Name: Travis Hunt  MRN: [id number]  : 1945  AGE: 78 y.o.   GENDER: male  ==============================================================================    2024  12:40 PM    I evaluated and examined the patient with the critical care team. As a multidisciplinary team, we discussed all findings, as well as assessment and plan. I personally spent 35 minutes of critical care time excluding procedures at the bedside with the patient. I personally reviewed all labs, results and studies. My independent note with assessment and plan are below:    Neurologic:        Severe TBI, SDH with midline shift    Decompressive craniotomy   Repeat head CT worsening right frontal edema around IPH   GCS 11T.  Follows on right and only moves toes on left   Goal Na 140-150, 3% NaCl and Q6 hour Na checks   Continue Q1 hour neuro checks   Scheduled tylenol for fever       Analgesia: fentanyl       Sedation: propofol  HEENT:        CHARLIE       C-collar: none  Cardiovascular:        PMHx: CAD, HTN, HLD       Vasopressors: none  Pulmonary:        Acute hypoxic respiratory failure   Tube exchanged , hub disconnected from tube  Oxygen requirement:  CMV 16/500/5/40  Gastrointestinal:        NS@75       Diet: TF via NG tube, advance to goal       GI prophylaxis: pepcid  Renal/:        RCC stage 3       BPH, flomax held       Evans catheter: maintain for accurate I/O  Hematologic:       Acute blood loss anemia, transfuse for hemoglobin >7       DVT, PE on coumadin (held and reversed)       Central line: central line       Arterial line: arterial line       DVT prophylaxis: SCD's; lovenox held for worsening head CT.  Plan to start lovenox  AM dose  Musculoskeletal:       ICU Mobility Score: 0       Skin breakdown: groin wound, under abdomen, bilateral thigh, unstageable sacral wound present on admission       Restraints: yes, maintain  restraints  Endocrine:       CHARLIE       Glucose control <180, POC glucose checks and insulin sliding scale  ID:       Fever, likely central.  Low suspicion for infectious fever.  Schedule        Antibiotics: none    Disposition: The patient remains remains critically ill.    Osmel Naylor MD

## 2024-08-30 NOTE — PROGRESS NOTES
[facility]  TRAUMA ICU - ATTENDING PROGRESS NOTE    Patient Name: Travis Hunt  MRN: [id number]  : 1945  AGE: 78 y.o.   GENDER: male  ==============================================================================    2024  12:43 PM    I evaluated and examined the patient with the critical care team. As a multidisciplinary team, we discussed all findings, as well as assessment and plan. I personally spent 35 minutes of critical care time excluding procedures at the bedside with the patient. I personally reviewed all labs, results and studies. My independent note with assessment and plan are below:    Neurologic:        Severe TBI, SDH with midline shift    Decompressive craniotomy   Repeat head CT worsening right frontal edema around IPH   GCS 11T.  Follows on right and only moves toes on left   Goal Na 140-150, 3% NaCl and Q6 hour Na checks   Continue Q1 hour neuro checks   Scheduled tylenol for fever       Analgesia: fentanyl       Sedation: propofol  HEENT:        CHARLIE       C-collar: none  Cardiovascular:        PMHx: CAD, HTN, HLD       Vasopressors: none  Pulmonary:        Acute hypoxic respiratory failure   Tube exchanged , hub disconnected from tube  Oxygen requirement:  CMV 16/500/5/40  Barrier to extubation is mental status  Gastrointestinal:        Diet: TF via NG tube, at goal       GI prophylaxis: DC pepcid  Renal/:        RCC stage 3       BPH, flomax held       Evans catheter: maintain for accurate I/O  Hematologic:       Acute blood loss anemia, transfuse for hemoglobin >7       DVT, PE on coumadin (held and reversed)       Central line: central line       Arterial line: arterial line       DVT prophylaxis: SCD's; lovenox 40mg Q12 (BMI)  Musculoskeletal:       ICU Mobility Score: 3       Skin breakdown: groin wound, under abdomen, bilateral thigh, unstageable sacral wound present on admission       Restraints: yes, maintain restraints  Endocrine:        CHARLIE       Glucose control <180, POC glucose checks and insulin sliding scale  ID:       Fever, likely central.  Low suspicion for infectious fever.  Bronch and BAL planned       Antibiotics: none    Disposition: The patient remains remains critically ill.    Osmel Naylor MD

## 2024-08-30 NOTE — CARE PLAN
Problem: Skin  Goal: Decreased wound size/increased tissue granulation at next dressing change  Outcome: Progressing  Goal: Participates in plan/prevention/treatment measures  Outcome: Progressing  Goal: Prevent/manage excess moisture  Outcome: Progressing  Goal: Prevent/minimize sheer/friction injuries  Outcome: Progressing  Goal: Promote/optimize nutrition  Outcome: Progressing  Goal: Promote skin healing  Outcome: Progressing     Problem: Fall/Injury  Goal: Not fall by end of shift  Outcome: Progressing  Goal: Be free from injury by end of the shift  Outcome: Progressing  Goal: Verbalize understanding of personal risk factors for fall in the hospital  Outcome: Progressing  Goal: Verbalize understanding of risk factor reduction measures to prevent injury from fall in the home  Outcome: Progressing  Goal: Use assistive devices by end of the shift  Outcome: Progressing  Goal: Pace activities to prevent fatigue by end of the shift  Outcome: Progressing     Problem: Pain  Goal: Takes deep breaths with improved pain control throughout the shift  Outcome: Progressing  Goal: Turns in bed with improved pain control throughout the shift  Outcome: Progressing  Goal: Walks with improved pain control throughout the shift  Outcome: Progressing  Goal: Performs ADL's with improved pain control throughout shift  Outcome: Progressing  Goal: Participates in PT with improved pain control throughout the shift  Outcome: Progressing  Goal: Free from opioid side effects throughout the shift  Outcome: Progressing  Goal: Free from acute confusion related to pain meds throughout the shift  Outcome: Progressing     Problem: Safety - Medical Restraint  Goal: Remains free of injury from restraints (Restraint for Interference with Medical Device)  Outcome: Progressing  Goal: Free from restraint(s) (Restraint for Interference with Medical Device)  Outcome: Progressing     Problem: Pain - Adult  Goal: Verbalizes/displays adequate comfort level  or baseline comfort level  Outcome: Progressing     Problem: Safety - Adult  Goal: Free from fall injury  Outcome: Progressing     Problem: Discharge Planning  Goal: Discharge to home or other facility with appropriate resources  Outcome: Progressing     Problem: Chronic Conditions and Co-morbidities  Goal: Patient's chronic conditions and co-morbidity symptoms are monitored and maintained or improved  Outcome: Progressing     Problem: Knowledge Deficit  Goal: Patient/family/caregiver demonstrates understanding of disease process, treatment plan, medications, and discharge instructions  Outcome: Progressing     Problem: Mechanical Ventilation  Goal: Patient Will Maintain Patent Airway  Outcome: Progressing  Goal: Oral health is maintained or improved  Outcome: Progressing  Goal: Tracheostomy will be managed safely  Outcome: Progressing  Goal: ET tube will be managed safely  Outcome: Progressing  Goal: Ability to express needs and understand communication  Outcome: Progressing  Goal: Mobility/activity is maintained at optimum level for patient  Outcome: Progressing   The patient's goals for the shift include      The clinical goals for the shift include Patient's oxygenation will remain sufficient    Goal met

## 2024-08-30 NOTE — PROGRESS NOTES
[facility]  TRAUMA SERVICE - PROGRESS NOTE    Patient Name: Travis Hunt  MRN: [id number]  Admit Date: 824  : 1945  AGE: 78 y.o.   GENDER: male  ==============================================================================  MECHANISM OF INJURY:   79 yo male came to [facility] as a transfer from [facility] after a fall backwards from a chair landing on the back of his head. + Headstrike. Unknown LOC. CTH with large SDH with midline shift and was transferred to [facility] for neurosurgical intervention and ICU admission. 3% NS at OSH. On arrival to [facility] campus, GCS 3. S/p emergent OR for decompressive craniotomy.     LOC (yes/no?): unknown  Anticoagulant / Anti-platelet Rx? 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  R posterior rib fracture of 12th rib unchanged since scan on 22.  Densities around left kidney  Ventral hernia containing bowel segment.   Solid lung nodule in Right lower lobe increased in size from previous exam on 21 and now 0.7 X 0.6 cm    PROCEDURES:  : Right craniotomy for hematoma evacuation    ==============================================================================  TODAY'S ASSESSMENT AND PLAN OF CARE:  79 yo M s/p fall with R SDH on coumadin/ASA/plavix s/p decompressive craniotomy. Repeat CTH with continued blossoming of intraparenchymal hematoma but stable subdural and subarachnoid collections. Last CT head 24 - stable right frontal hematoma with surrounding edema, stable SDH.    - Continue keppra (until )  - Increase tube feeds towards goal  - DVT ppx: lovenox 30 mg BID    Discussed with attending surgeon, Dr. Colon.    Elis Rodney MD   PGY4  General Surgery   Trauma Surgery    ==============================================================================  CHIEF COMPLAINT / OVERNIGHT EVENTS:   Febrile overnight. Cooling blanket placed. No leukocytosis.     MEDICAL HISTORY / ROS:  Admission history and ROS reviewed. Pertinent changes as follows:  None    PHYSICAL EXAM:  Heart Rate:  [83-98]   Temp:  [36.5 °C (97.7 °F)-38.7 °C (101.7 °F)]   Resp:  [20-35]   BP: ()/(32-82)   Weight:  [141 kg (311 lb 1.1 oz)]   SpO2:  [91 %-100 %]   Physical Exam  Constitutional:       Comments: Sedated   Eyes:      Pupils: Pupils are equal, round, and reactive to light.   Cardiovascular:      Rate and Rhythm: Normal rate and regular rhythm.   Pulmonary:      Effort: No respiratory distress.      Comments: Intubated  Abdominal:      General: There is no distension.      Palpations: Abdomen is soft.      Tenderness: There is no abdominal tenderness.   Skin:     General: Skin is warm and dry.   Neurological:      Comments: GCS 11T.  Follows on right and only moves toes on left.   S/p craniotomy       IMAGING SUMMARY:    CXR 8/30:  Perihilar and bibasilar opacity which may be due to combination of edema and atelectasis. Cannot exclude superimposed infection. Suggestion of small bilateral pleural effusion.    LABS:  Results from last 7 days   Lab Units 08/30/24  0408 08/29/24  0204 08/28/24  0220   WBC AUTO x10*3/uL 9.8 8.5 9.5   HEMOGLOBIN g/dL 10.5* 7.1* 7.3*   HEMATOCRIT % 31.8* 21.7* 22.1*   PLATELETS AUTO x10*3/uL 130* 152 147*     Results from last 7 days   Lab Units 08/30/24  0510 08/29/24  0204 08/27/24  0100   APTT seconds 29 26* 26*   INR  1.3* 1.2* 1.0     Results from last 7 days   Lab Units 08/30/24  0408 08/30/24  0131 08/29/24  1350 08/24/24  2201 08/24/24  2058   SODIUM mmol/L 147*  147* 147* 147*   < > 141   POTASSIUM mmol/L 3.8  3.8 3.8 3.6   < > 4.8   CHLORIDE mmol/L 112*  112* 113* 116*   < > 111*   CO2 mmol/L 24  24 24 23   < > 20*   BUN  mg/dL 31*  31* 31* 31*   < > 31*   CREATININE mg/dL 1.31*  1.31* 1.33* 1.22   < > 1.59*   CALCIUM mg/dL 8.0*  8.0* 8.0* 7.6*   < > 8.2*   PROTEIN TOTAL g/dL  --   --   --   --  6.2*   BILIRUBIN TOTAL mg/dL  --   --   --   --  0.6   ALK PHOS U/L  --   --   --   --  54   ALT U/L  --   --   --   --  12   AST U/L  --   --   --   --  32   GLUCOSE mg/dL 146*  146* 131* 145*   < > 129*    < > = values in this interval not displayed.     Results from last 7 days   Lab Units 08/24/24  2058   BILIRUBIN TOTAL mg/dL 0.6     Results from last 7 days   Lab Units 08/30/24  0541 08/30/24  0407 08/30/24  0130   POCT PH, ARTERIAL pH 7.46* 7.51* 7.50*   POCT PCO2, ARTERIAL mm Hg 33* 30* 30*   POCT PO2, ARTERIAL mm Hg 85 77* 102*   POCT HCO3 CALCULATED, ARTERIAL mmol/L 23.5 23.9 23.4   POCT BASE EXCESS, ARTERIAL mmol/L -0.1 1.6 0.7       I have reviewed all medications, laboratory results, and imaging pertinent for today's encounter.

## 2024-08-30 NOTE — CONSULTS
"Vancomycin Dosing by Pharmacy  INITIAL CONSULT      Travis Hunt is a 78 y.o. male who Pharmacy is consulted to dose vancomycin for pneumonia.     Based on the patient's indication and renal status, this patient will be dosed based on a goal vancomycin AUC of 400-600.     Renal function is currently stable.    Estimated Creatinine Clearance: 71.4 mL/min (by C-G formula based on SCr of 1.23 mg/dL).    Results from last 7 days   Lab Units 08/30/24  1224 08/30/24  0408 08/30/24  0131 08/29/24  1350 08/29/24  0204 08/28/24  0616 08/28/24  0220 08/27/24  2123 08/27/24  1748   CREATININE mg/dL 1.23 1.31*  1.31* 1.33* 1.22 1.17   < > 1.15  1.15   < > 1.09   BUN mg/dL 34* 31*  31* 31* 31* 30*   < > 26*  26*   < > 22   WBC AUTO x10*3/uL  --  9.8  --   --  8.5  --  9.5  --  9.2    < > = values in this interval not displayed.        Visit Vitals  BP (!) 112/35   Pulse 90   Temp (!) 38.3 °C (100.9 °F) (Bladder)   Resp 23       No results found for: \"STAPHMRSASCR\", \"GRAMSTAIN\", \"URINECULTURE\", \"BLOODCULT\", \"TISWDCULTSME\"     No results found for the last 90 days.      Assessment/Plan     Will order loading dose of 2000 mg followed by maintenance dose of 1250 mg every 24 hours. This dosing regimen is predicted by [employer] to result in the following pharmacokinetic parameters:  Regimen: 1250 mg IV every 24 hours.  Start time: 16:08 on 08/30/2024  Exposure target: AUC24 (range)400-600 mg/L.hr   AUC24,ss: 450 mg/L.hr  Probability of AUC24 > 400: 60 %  Ctrough,ss: 11.2 mg/L  Probability of Ctrough,ss > 20: 20 %  Probability of nephrotoxicity (Lodise ARTIE 2009): 7 %    Vancomycin follow-up level will be ordered for 8/31 at AM , unless clinically indicated sooner.  Will continue to monitor renal function daily while on vancomycin and order serum creatinine at least every 48 hours if not already ordered.  Will follow for continued vancomycin needs, clinical response, and signs/symptoms of toxicity.       Ozzie Maldonado, PharmD       "

## 2024-08-30 NOTE — PROGRESS NOTES
[facility]  TRAUMA ICU - PROGRESS NOTE     Patient Name: Travis Hunt  MRN: [id number]  Admit Date: 824  : 1945                      AGE: 78 y.o.                             GENDER: male  ==============================================================================     MECHANISM OF INJURY:   77 yo male came to [facility] as a transfer from [facility] after a fall backwards from a chair landing on the back of his head. + Headstrike. Unknown LOC. NIH of 4 at , altered and combative, so he was intubated at outside hospital for airway protection. CT Head found large SDH with midline shift and was transferred to [facility] for neurosurgical intervention and ICU admission. 3% NS at OSH. On arrival to [facility] campus, GCS 3. Sedation turned off. Plan for emergent OR for decompressive craniotomy.     LOC (yes/no?): unknown  Anticoagulant / Anti-platelet Rx? (for what dx?): ASA abd Plavix  Referring Facility Name (N/A for scene EMR run): [facility]     INJURIES:   R subdural hematoma with 1.5 cm Right to left shift     OTHER MEDICAL PROBLEMS:  Prior fracture deformity of L transverse process of L5.  R posterior rib fracture of 12th rib unchanged since scan on 22.     INCIDENTAL FINDINGS:  Densities around left kidney  Ventral hernia containing bowel segment.   Solid lung nodule in Right lower lobe increased in size from previous exam on 21 and now 0.7 X 0.6 cm.     PROCEDURES:  : Right craniotomy for SDH evacuation     Tmax 38.3, HR nml, SBP normal, DBP low  16/500/30/5  7.43/32/84/21.2  2245 UOP, 0.9cc/kg/h, BM   Hypernatremia to 150, hyyperchloremia  Hgb 7.1 (stable)  ==============================================================================  TODAY'S ASSESSMENT AND PLAN OF CARE:  Travis Hunt is a 78 y.o. male in the ICU due to: SDH after fall on plavix/coumadin.     NEURO/PAIN/SEDATION:   # Severe traumatic brain injury and SDH s/p decompressive crainiotomy on  8/24.  - Patient completed his 7-day course of Keppra.    # Acute pain: Continues on a fentanyl drip while he is intubated, 25mg.  Also has Tylenol scheduled.  #Agitation.  Patient is on propofol while intubated, 25mg.  He does get intermittently agitated as evidenced by dyssynchrony with the vent and hypertension.       RESPIRATORY: #Acute respiratory failure secondary to traumatic brain injury.  Currently on AC/VC: 16/500/40/5.  will continue with daily and as needed chest x-rays and ABGs.     CARDIOVASC:   # H/o CAD, HTN, HLD - s/p coronary stent.  Will restart home atorvastatin 40 mg nightly.  Patient does not take medications for blood pressure at home.  We are holding his home Plavix 75 mg daily and Coumadin 5 mg daily 5 days a week and 2.5 mg 2 days a week.  - Levophed started due to hypotension     GI: #History of GERD.  Continuing home famotidine 20 mg daily.  Patient also takes omeprazole 20 mg daily.  We are holding this medication at this time.  Patient has a Dobbhoff in place.  He is receiving Pivot 1.5 at 45 cc/h.  He is tolerating this tube feed.  Will start bowel regimen with MiraLAX and senna S.  - OG tube replaced    :   #History of CKD 3 in setting of renal cell carcinoma.    - Currently around baseline creatinine of ~ 1.12.  Evans in place for strict I's and O's.     #History of BPH  Holding home tamsulosin at this point.  Will restart when appropriate       HEMATOLOGIC: # Acute blood loss anemia, stable.  Last received PRBCs on 8/27/2024.  Continue to hold home Plavix and warfarin at this time in setting of head bleed.  Will continue to monitor with daily CBCs.  Will transfuse as appropriate.     ENDOCRINE: No active issues.  Continue with every 4 hour blood glucose checks with sliding scale insulin.  Blood glucose have been between 125 and 177.  Has not received any insulin at this time.      MUSCULOSKELETAL/SKIN: #Skin tear underlying pannus on the right.  Local wound care.  Patient and is  on a specialty bed.  Mepilex to bony prominences.  Continue to help patient turn every 2 hours.     INFECTIOUS DISEASE: # Resolved Leukocytosis.    - Patient does have intermittent febrile episodes.  Likely secondary to central hyperthermia.  - No antibiotic needs at this time.    - BAL with bronchoscopy 8/30, then decide on abx  - Started Vanc and doxy (allergy to penicillins)     GI PROPHYLAXIS: home famotidine  DVT PROPHYLAXIS: SCDs, lovenox 30mg     DISPOSITION: TICU     This patient was seen and discussed with Dr. Dayday Graham, DO  Trauma, Critical Care, ACS  [id number]/ Epic chat        ==============================================================================  CHIEF COMPLAINT / OVERNIGHT EVENTS / HPI:   Patient with a fever up to 38.5C.  Placed under cooling blanket, which helped.  Patient became slightly hypotensive after sedation and patient's FiO2 was increased to 40%.  Some concern for thyroid storm.     MEDICAL HISTORY / ROS:  Admission history and ROS reviewed. Pertinent changes as follows: None.     PHYSICAL EXAM:  .Heart Rate:  [83-98]   Temp:  [36.5 °C (97.7 °F)-38.7 °C (101.7 °F)]   Resp:  [20-35]   BP: ()/(32-82)   Weight:  [141 kg (311 lb 1.1 oz)]   SpO2:  [91 %-100 %]       Physical Exam  Vitals and nursing note reviewed.   Constitutional:       General: He is not in acute distress.     Interventions: He is sedated and intubated.   HENT:      Head:      Comments: Staples in place reflecting s/p R craniotomy; subgaleal drain in place to bulb suction     Nose: Nose normal.      Mouth/Throat:      Comments: Endotracheal tube in place and secured  Eyes:      Conjunctiva/sclera: Conjunctivae normal.      Pupils: Pupils are equal, round, and reactive to light.   Cardiovascular:      Rate and Rhythm: Normal rate and regular rhythm.      Pulses: Normal pulses.   Pulmonary:      Effort: Pulmonary effort is normal. No respiratory distress. He is intubated.      Comments: Mechanically  ventilated on AC VC  Abdominal:      Palpations: Abdomen is soft.   Genitourinary:     Comments: Evans catheter in place.  Musculoskeletal:         General: Swelling present.      Comments: SCDs in place. Significant non-pitting edema to BLE.   Skin:     General: Skin is warm and dry.      Findings: Bruising and lesion present.      Comments: Scattered ecchymoses and bullae on BLE.   Neurological:      Comments: Sedated. Moves RUE and RLE;  withdraw LUE or LLE from pain.

## 2024-08-30 NOTE — NURSING NOTE
MD Naylor, MD Fitch, RRT, and RN at bedside at 1625 for ETT exchange. MD Naylor pushed 20mg of etomidate at 1628 and 100mg of succinylcholine at 1630.

## 2024-08-30 NOTE — PROGRESS NOTES
Spiritual Care Visit    Clinical Encounter Type  Visited With: Patient  Routine Visit: Follow-up  Continue Visiting: Yes  Crisis Visit: Critical care    Caodaism Encounters  Caodaism Needs: Prayer         Sacramental Encounters  Other Sacrament: P&B S    Patient was unresponsive, received a prayer and blessing by Fr. Osmel Armstrong,  Yarsanism .    Patient had received the Sacrament of the Anointing of the Sick on 8/28/24.

## 2024-08-30 NOTE — PROGRESS NOTES
Occupational Therapy    Occupational Therapy Treatment    Name: Travis Hunt  MRN: [id number]  : 1945  Date: 24  Time Calculation  Start Time: 1029  Stop Time: 1100  Time Calculation (min): 31 min    Assessment:  OT Assessment: Patient with no command follow observed. Patient with withdrawal to painful stimuli on LLE and eventually on RLE with increase pressure to nailbed. No UE withdrawals noted. L lateral and forward neck flexion noted with wedging to assist with midline positioning. Resistance noted in BUEs with PROM; more resistive in LUE compared to RUE. Resistance to eye opening noted as well. Patient with hypotension at end of session with RN present and to notify team. Continue MOD intensity OT rec.  Prognosis: Fair  Barriers to Discharge: Other (Comment) (medical acuity)  Evaluation/Treatment Tolerance: Other (Comment) (limited 2/2 alertness/command follow)  End of Session Communication: Bedside nurse  End of Session Patient Position: Bed, 3 rail up, Alarm off, not on at start of session (B wrist restraints donned)  Plan:  Treatment Interventions: ADL retraining, Visual perceptual retraining, Functional transfer training, UE strengthening/ROM, Cognitive reorientation, Patient/family training, Neuromuscular reeducation, Fine motor coordination activities, Endurance training, Equipment evaluation/education, Compensatory technique education, UE splinting, Continued evaluation  OT Frequency: 3 times per week  OT Discharge Recommendations: Moderate intensity level of continued care  OT Recommended Transfer Status: Dependent (in bed activity)  OT - OK to Discharge: Yes (when medically appropriate)    Subjective   Previous Visit Info:  OT Last Visit  OT Received On: 24  General:  General  Family/Caregiver Present: No  Prior to Session Communication: Bedside nurse, Physician (Per physician during interdiscplinary morning rounds, ok to see.)  Patient Position Received: Bed, 3 rail up, Alarm off, not  on at start of session (B wrist restraints donned)  Preferred Learning Style: auditory, verbal, visual  General Comment: Per RN, no command follow changes noted with sedation off. Per RN, patient on increased sedation 2/2 desaturation with less sedation.  Precautions:  Hearing/Visual Limitations: unable to determine with no command follow  Medical Precautions: Fall precautions, Oxygen therapy device and L/min (ETT to vent: VC-AC 40%, 16 RR, 5 PEEP)    Vitals:      08/30/24 1029 08/30/24 1100   Vital Signs   Vitals Session Pre OT Post OT   Heart Rate 85 83   Resp (!) 31 (!) 28   SpO2 94 % 93 %   /50 (!) 97/32   MAP (mmHg) 68 54  (RN present and to notify team.)              Pain Assessment:  Pain Assessment  Pain Assessment: CPOT (Critical Care Pain Observation Tool)  0-10 (Numeric) Pain Score: 0 - No pain     Objective   Cognition:  Overall Cognitive Status: Impaired  Arousal/Alertness:  (Only BLE withdrawal responses noted; no other response to stimuli provided.)  Orientation Level: Unable to assess  Following Commands:  (No command follow observed.)  Cognition Comments: No yes/no command follow observed. Inconsistent with command follow regarding movement (observed wiggling toes and squeezing R hand to command; however inconsistent). Demo spontaneous movement in 4/4 extremities. No response observed to ice cold sensory stimuli provided to mouth, face and hands.  Processing Speed: Delayed  Activities of Daily Living: Grooming  Grooming Level of Assistance: Dependent  Grooming Where Assessed: Bed level  Grooming Comments: No response noted to ice cold washcloth to face and ice cold swab to mouth.    Bed Mobility/Transfers: Bed Mobility  Bed Mobility: Yes  Bed Mobility 1  Bed Mobility 1: Rolling right  Level of Assistance 1: Dependent, +2  Bed Mobility Comments 1: TOTAL A x2 to safely roll; Sidelying ~10 secs with TOTAL A x1. No response noted to this change in position.    Therapy/Activity: Therapeutic  Exercise  Therapeutic Exercise Performed: Yes  Therapeutic Exercise Activity 1: BUE PROM provided and tolerated; VSS. Patient with mod resistance in LUE and min resistance in RUE when PROM completed.       Sensory:     Cognitive Skill Development:  Cognitive Skill Development  Cognitive Skill Development Activity 1: Reviewed orientation.  Cognitive Skill Development Activity 2: Sensory stimuli provided and environmental modifications to increase alertness; no changes noted.    Other Activity:       RUE   RUE :  (PROM WFL with mild resistance to ROM) and LUE   LUE:  (PROM WFL with moderate restistance to ROM)    Outcome Measures:  [facility] Daily Activity  Putting on and taking off regular lower body clothing: Total  Bathing (including washing, rinsing, drying): Total  Putting on and taking off regular upper body clothing: Total  Toileting, which includes using toilet, bedpan or urinal: Total  Taking care of personal grooming such as brushing teeth: Total  Eating Meals: Total  Daily Activity - Total Score: 6     Confusion Assessment Method-ICU (CAM-ICU)  Feature 1: Acute Onset or Fluctuating Course: Positive  Feature 3: Altered Level of Consciousness: Positive    Education Documentation  Body Mechanics, taught by Adele Lira OT at 8/30/2024 12:55 PM.  Learner: Patient  Readiness: Acceptance  Method: Explanation  Response: No Evidence of Learning  Comment: orientation and simple command follow    Precautions, taught by Adele Lira OT at 8/30/2024 12:55 PM.  Learner: Patient  Readiness: Acceptance  Method: Explanation  Response: No Evidence of Learning  Comment: orientation and simple command follow    ADL Training, taught by Adele Lira OT at 8/30/2024 12:55 PM.  Learner: Patient  Readiness: Acceptance  Method: Explanation  Response: No Evidence of Learning  Comment: orientation and simple command follow    Education Comments  No comments found.      Goals:  Encounter Problems       Encounter Problems  (Active)       ADLs       Patient will complete grooming with MOD assist and min cues.   (Not Progressing)       Start:  08/28/24    Expected End:  09/11/24            Patient will complete UB dressing with MOD assist and min cues.   (Not Progressing)       Start:  08/28/24    Expected End:  09/11/24               BALANCE       Patient will demo sitting balance for at least 8 min with MOD A in prep for sitting ADLs. (Not Progressing)       Start:  08/28/24    Expected End:  09/11/24               COGNITION/SAFETY       Patient will demo sustained visual attention to at least 4 min with min v/c.  (Not Progressing)       Start:  08/28/24    Expected End:  09/11/24            Patient will be A&Ox4 using external memory strategies as needed and initially cueing only.   (Not Progressing)       Start:  08/28/24    Expected End:  09/11/24               EXERCISE/STRENGTHENING       Patient will demo UE HEP with min v/c.  (Not Progressing)       Start:  08/28/24    Expected End:  09/11/24               MOBILITY       Patient will complete bed mobility with MOD A and min cues.    (Not Progressing)       Start:  08/28/24    Expected End:  09/11/24 08/30/24 at 12:58 PM - Adele Lira, OT

## 2024-08-30 NOTE — PROCEDURES
Bronchoscopy Procedure Note    Procedure:  Bronchoscopy, Diagnostic  Bronchoalveolar lavage, BAL    Pre-Operative Diagnosis: Acute hypoxic respiratory failure with pneumonia    Post-Operative Diagnosis: Same    Indication: Bronchoscopy with bronchoalveolar lavage to assist with cultures    Anesthesia: Moderate Sedation    Procedure Details: Patient's wife was consented for the procedure with all risk and benefit of the procedure explained in detail.  Patient was given the opportunity to ask questions and all concerns were answered.  The bronchocope was inserted into the main airway via the endotracheal tube. An anatomical survey was done of the main airways and the subsegmental bronchus.  Right main stem, right upper, middle and lower airways without mucus plugging, no obvious lesions, normal mucosa. A bronchialalveolar lavage was performed on right lower airways using aliquots of normal saline instilled into the airways then aspirated back. The left main stem, left upper and lower airways with some thick mucus discharge, not obstructing airways, normal mucosa. A bronchialalveolar lavage was performed on left lower airways using aliquots of normal saline instilled into the airways then aspirated back.     Estimated Blood Loss:  Minimal           Specimens:  Sent fluid culture and gram stain                Complications:  None; patient tolerated the procedure well.           Disposition: ICU - intubated and critically ill.    Patient tolerated the procedure well. Post-bronchoscopy chest x-ray pending.    Carina Herndno MD

## 2024-08-31 ENCOUNTER — APPOINTMENT (OUTPATIENT)
Dept: RADIOLOGY | Facility: HOSPITAL | Age: 79
End: 2024-08-31
Payer: MEDICARE

## 2024-08-31 LAB
ALBUMIN SERPL BCP-MCNC: 2.2 G/DL (ref 3.4–5)
ALBUMIN SERPL BCP-MCNC: 2.2 G/DL (ref 3.4–5)
ANION GAP BLDA CALCULATED.4IONS-SCNC: 11 MMO/L (ref 10–25)
ANION GAP SERPL CALC-SCNC: 13 MMOL/L (ref 10–20)
ANION GAP SERPL CALC-SCNC: 13 MMOL/L (ref 10–20)
APTT PPP: 29 SECONDS (ref 27–38)
BASE EXCESS BLDA CALC-SCNC: -1.6 MMOL/L (ref -2–3)
BODY TEMPERATURE: 37 DEGREES CELSIUS
BUN SERPL-MCNC: 32 MG/DL (ref 6–23)
BUN SERPL-MCNC: 35 MG/DL (ref 6–23)
CA-I BLDA-SCNC: 1.17 MMOL/L (ref 1.1–1.33)
CALCIUM SERPL-MCNC: 7.4 MG/DL (ref 8.6–10.6)
CALCIUM SERPL-MCNC: 7.4 MG/DL (ref 8.6–10.6)
CHLORIDE BLDA-SCNC: 111 MMOL/L (ref 98–107)
CHLORIDE SERPL-SCNC: 111 MMOL/L (ref 98–107)
CHLORIDE SERPL-SCNC: 112 MMOL/L (ref 98–107)
CO2 SERPL-SCNC: 22 MMOL/L (ref 21–32)
CO2 SERPL-SCNC: 23 MMOL/L (ref 21–32)
CREAT SERPL-MCNC: 1.33 MG/DL (ref 0.5–1.3)
CREAT SERPL-MCNC: 1.35 MG/DL (ref 0.5–1.3)
EGFRCR SERPLBLD CKD-EPI 2021: 54 ML/MIN/1.73M*2
EGFRCR SERPLBLD CKD-EPI 2021: 55 ML/MIN/1.73M*2
ERYTHROCYTE [DISTWIDTH] IN BLOOD BY AUTOMATED COUNT: 15.9 % (ref 11.5–14.5)
GLUCOSE BLD MANUAL STRIP-MCNC: 142 MG/DL (ref 74–99)
GLUCOSE BLD MANUAL STRIP-MCNC: 148 MG/DL (ref 74–99)
GLUCOSE BLD MANUAL STRIP-MCNC: 160 MG/DL (ref 74–99)
GLUCOSE BLD MANUAL STRIP-MCNC: 161 MG/DL (ref 74–99)
GLUCOSE BLD MANUAL STRIP-MCNC: 167 MG/DL (ref 74–99)
GLUCOSE BLD MANUAL STRIP-MCNC: 171 MG/DL (ref 74–99)
GLUCOSE BLDA-MCNC: 170 MG/DL (ref 74–99)
GLUCOSE SERPL-MCNC: 153 MG/DL (ref 74–99)
GLUCOSE SERPL-MCNC: 203 MG/DL (ref 74–99)
HCO3 BLDA-SCNC: 22.8 MMOL/L (ref 22–26)
HCT VFR BLD AUTO: 23.1 % (ref 41–52)
HCT VFR BLD EST: 23 % (ref 41–52)
HGB BLD-MCNC: 7 G/DL (ref 13.5–17.5)
HGB BLDA-MCNC: 7.5 G/DL (ref 13.5–17.5)
INHALED O2 CONCENTRATION: 40 %
INR PPP: 1.3 (ref 0.9–1.1)
LACTATE BLDA-SCNC: 0.9 MMOL/L (ref 0.4–2)
MAGNESIUM SERPL-MCNC: 1.91 MG/DL (ref 1.6–2.4)
MCH RBC QN AUTO: 28.9 PG (ref 26–34)
MCHC RBC AUTO-ENTMCNC: 30.3 G/DL (ref 32–36)
MCV RBC AUTO: 96 FL (ref 80–100)
NRBC BLD-RTO: 0 /100 WBCS (ref 0–0)
OSMOLALITY SERPL: 302 MOSM/KG (ref 280–300)
OSMOLALITY SERPL: 305 MOSM/KG (ref 280–300)
OXYHGB MFR BLDA: 95.2 % (ref 94–98)
PCO2 BLDA: 36 MM HG (ref 38–42)
PH BLDA: 7.41 PH (ref 7.38–7.42)
PHOSPHATE SERPL-MCNC: 2.8 MG/DL (ref 2.5–4.9)
PHOSPHATE SERPL-MCNC: 3.5 MG/DL (ref 2.5–4.9)
PLATELET # BLD AUTO: 182 X10*3/UL (ref 150–450)
PO2 BLDA: 77 MM HG (ref 85–95)
POTASSIUM BLDA-SCNC: 4.3 MMOL/L (ref 3.5–5.3)
POTASSIUM SERPL-SCNC: 3.9 MMOL/L (ref 3.5–5.3)
POTASSIUM SERPL-SCNC: 4.4 MMOL/L (ref 3.5–5.3)
PROTHROMBIN TIME: 14.1 SECONDS (ref 9.8–12.8)
RBC # BLD AUTO: 2.42 X10*6/UL (ref 4.5–5.9)
SAO2 % BLDA: 98 % (ref 94–100)
SODIUM BLDA-SCNC: 140 MMOL/L (ref 136–145)
SODIUM SERPL-SCNC: 142 MMOL/L (ref 136–145)
SODIUM SERPL-SCNC: 144 MMOL/L (ref 136–145)
VANCOMYCIN SERPL-MCNC: 18.7 UG/ML (ref 5–20)
WBC # BLD AUTO: 13.3 X10*3/UL (ref 4.4–11.3)

## 2024-08-31 PROCEDURE — 37799 UNLISTED PX VASCULAR SURGERY: CPT

## 2024-08-31 PROCEDURE — 37799 UNLISTED PX VASCULAR SURGERY: CPT | Performed by: STUDENT IN AN ORGANIZED HEALTH CARE EDUCATION/TRAINING PROGRAM

## 2024-08-31 PROCEDURE — 80202 ASSAY OF VANCOMYCIN: CPT

## 2024-08-31 PROCEDURE — 94003 VENT MGMT INPAT SUBQ DAY: CPT

## 2024-08-31 PROCEDURE — 2500000004 HC RX 250 GENERAL PHARMACY W/ HCPCS (ALT 636 FOR OP/ED): Performed by: STUDENT IN AN ORGANIZED HEALTH CARE EDUCATION/TRAINING PROGRAM

## 2024-08-31 PROCEDURE — 2500000005 HC RX 250 GENERAL PHARMACY W/O HCPCS: Performed by: STUDENT IN AN ORGANIZED HEALTH CARE EDUCATION/TRAINING PROGRAM

## 2024-08-31 PROCEDURE — 2500000001 HC RX 250 WO HCPCS SELF ADMINISTERED DRUGS (ALT 637 FOR MEDICARE OP): Performed by: NURSE PRACTITIONER

## 2024-08-31 PROCEDURE — 84132 ASSAY OF SERUM POTASSIUM: CPT

## 2024-08-31 PROCEDURE — 71045 X-RAY EXAM CHEST 1 VIEW: CPT | Performed by: RADIOLOGY

## 2024-08-31 PROCEDURE — 83930 ASSAY OF BLOOD OSMOLALITY: CPT | Performed by: STUDENT IN AN ORGANIZED HEALTH CARE EDUCATION/TRAINING PROGRAM

## 2024-08-31 PROCEDURE — 82947 ASSAY GLUCOSE BLOOD QUANT: CPT

## 2024-08-31 PROCEDURE — 2500000004 HC RX 250 GENERAL PHARMACY W/ HCPCS (ALT 636 FOR OP/ED): Performed by: PHYSICIAN ASSISTANT

## 2024-08-31 PROCEDURE — 71045 X-RAY EXAM CHEST 1 VIEW: CPT

## 2024-08-31 PROCEDURE — 83735 ASSAY OF MAGNESIUM: CPT

## 2024-08-31 PROCEDURE — 85027 COMPLETE CBC AUTOMATED: CPT

## 2024-08-31 PROCEDURE — 2500000002 HC RX 250 W HCPCS SELF ADMINISTERED DRUGS (ALT 637 FOR MEDICARE OP, ALT 636 FOR OP/ED)

## 2024-08-31 PROCEDURE — 2020000001 HC ICU ROOM DAILY

## 2024-08-31 PROCEDURE — 2500000004 HC RX 250 GENERAL PHARMACY W/ HCPCS (ALT 636 FOR OP/ED): Mod: JZ

## 2024-08-31 PROCEDURE — 2500000005 HC RX 250 GENERAL PHARMACY W/O HCPCS

## 2024-08-31 PROCEDURE — 85610 PROTHROMBIN TIME: CPT

## 2024-08-31 PROCEDURE — 84100 ASSAY OF PHOSPHORUS: CPT

## 2024-08-31 PROCEDURE — 2500000004 HC RX 250 GENERAL PHARMACY W/ HCPCS (ALT 636 FOR OP/ED)

## 2024-08-31 PROCEDURE — 2500000001 HC RX 250 WO HCPCS SELF ADMINISTERED DRUGS (ALT 637 FOR MEDICARE OP)

## 2024-08-31 PROCEDURE — 2500000004 HC RX 250 GENERAL PHARMACY W/ HCPCS (ALT 636 FOR OP/ED): Performed by: NURSE PRACTITIONER

## 2024-08-31 RX ORDER — ENOXAPARIN SODIUM 100 MG/ML
40 INJECTION SUBCUTANEOUS 2 TIMES DAILY
Status: DISCONTINUED | OUTPATIENT
Start: 2024-08-31 | End: 2024-09-03

## 2024-08-31 ASSESSMENT — PAIN - FUNCTIONAL ASSESSMENT
PAIN_FUNCTIONAL_ASSESSMENT: CPOT (CRITICAL CARE PAIN OBSERVATION TOOL)

## 2024-08-31 NOTE — PROGRESS NOTES
[facility]  TRAUMA SERVICE - PROGRESS NOTE    Patient Name: Travis Hunt  MRN: [id number]  Admit Date: 824  : 1945  AGE: 78 y.o.   GENDER: male  ==============================================================================  MECHANISM OF INJURY:   77 yo male came to [facility] as a transfer from [facility] after a fall backwards from a chair landing on the back of his head. + Headstrike. Unknown LOC. CTH with large SDH with midline shift and was transferred to [facility] for neurosurgical intervention and ICU admission. 3% NS at OSH. On arrival to [facility] campus, GCS 3. S/p emergent OR for decompressive craniotomy.     LOC (yes/no?): unknown  Anticoagulant / Anti-platelet Rx? (for what dx?): coumadin/ASA, plavix  Referring Facility Name (N/A for scene EMR run): 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  R posterior rib fracture of 12th rib unchanged since scan on 22.  Densities around left kidney  Ventral hernia containing bowel segment.   Solid lung nodule in Right lower lobe increased in size from previous exam on 21 and now 0.7 X 0.6 cm    PROCEDURES:  : Right craniotomy for hematoma evacuation  : Bronchoscopy    ==============================================================================  TODAY'S ASSESSMENT AND PLAN OF CARE:  77 yo M s/p fall with R SDH on coumadin/ASA/plavix s/p decompressive craniotomy. Repeat CTH with continued blossoming of intraparenchymal hematoma but stable subdural and subarachnoid collections. Last CT head 24 - stable right frontal hematoma with surrounding edema, stable SDH. Now febrile, requiring levophed. Bronchoscopy  with BAL growing gram + cocci.     - Continue keppra (until )  - Continue tube feeds at goal  - Follow-up  BAL cultures  - Continue vanc/cefepime  - DVT ppx: lovenox 30 mg BID    Discussed with attending surgeon, Dr. Cooln.    Elis Rodney MD  PGY4  General Surgery   Trauma Surgery    ==============================================================================  CHIEF COMPLAINT / OVERNIGHT EVENTS:   Febrile overnight. Cooling blanket placed. Now with leukocytosis.     MEDICAL HISTORY / ROS:  Admission history and ROS reviewed. Pertinent changes as follows:  None    PHYSICAL EXAM:  Heart Rate:  [62-92]   Temp:  [37.4 °C (99.3 °F)-38.7 °C (101.7 °F)]   Resp:  [16-35]   BP: ()/(28-72)   SpO2:  [92 %-99 %]   Physical Exam  Constitutional:       Comments: Sedated   Eyes:      Pupils: Pupils are equal, round, and reactive to light.   Cardiovascular:      Rate and Rhythm: Normal rate and regular rhythm.   Pulmonary:      Effort: No respiratory distress.      Comments: Intubated  Abdominal:      General: There is no distension.      Palpations: Abdomen is soft.      Tenderness: There is no abdominal tenderness.   Skin:     General: Skin is warm and dry.   Neurological:      Comments: GCS 11T.  Follows on right and only moves toes on left.   S/p craniotomy       IMAGING SUMMARY:    CXR 8/31:  Worsening aeration of bilateral lungs with bibasilar consolidation.    LABS:  Results from last 7 days   Lab Units 08/31/24  0331 08/30/24  0408 08/29/24  0204   WBC AUTO x10*3/uL 13.3* 9.8 8.5   HEMOGLOBIN g/dL 7.0* 10.5* 7.1*   HEMATOCRIT % 23.1* 31.8* 21.7*   PLATELETS AUTO x10*3/uL 182 130* 152     Results from last 7 days   Lab Units 08/31/24  0331 08/30/24  0510 08/29/24  0204   APTT seconds 29 29 26*   INR  1.3* 1.3* 1.2*     Results from last 7 days   Lab Units 08/31/24  0607 08/31/24  0055 08/30/24  1814 08/24/24  2201 08/24/24  2058   SODIUM mmol/L 142 144 145   < > 141   POTASSIUM mmol/L 4.4 3.9 4.0   < > 4.8   CHLORIDE mmol/L 111* 112* 112*   < > 111*   CO2 mmol/L 22 23 23   < > 20*   BUN mg/dL 35* 32* 32*   < >  31*   CREATININE mg/dL 1.35* 1.33* 1.27   < > 1.59*   CALCIUM mg/dL 7.4* 7.4* 7.5*   < > 8.2*   PROTEIN TOTAL g/dL  --   --   --   --  6.2*   BILIRUBIN TOTAL mg/dL  --   --   --   --  0.6   ALK PHOS U/L  --   --   --   --  54   ALT U/L  --   --   --   --  12   AST U/L  --   --   --   --  32   GLUCOSE mg/dL 153* 203* 119*   < > 129*    < > = values in this interval not displayed.     Results from last 7 days   Lab Units 08/24/24  2058   BILIRUBIN TOTAL mg/dL 0.6     Results from last 7 days   Lab Units 08/31/24  0332 08/30/24  2156 08/30/24  0541   POCT PH, ARTERIAL pH 7.41 7.39 7.46*   POCT PCO2, ARTERIAL mm Hg 36* 36* 33*   POCT PO2, ARTERIAL mm Hg 77* 294* 85   POCT HCO3 CALCULATED, ARTERIAL mmol/L 22.8 21.8* 23.5   POCT BASE EXCESS, ARTERIAL mmol/L -1.6 -2.9* -0.1       I have reviewed all medications, laboratory results, and imaging pertinent for today's encounter.

## 2024-08-31 NOTE — PROGRESS NOTES
[facility]  TRAUMA ICU - PROGRESS NOTE     Patient Name: Travis Hunt  MRN: [id number]  Admit Date: 824  : 1945                      AGE: 78 y.o.                             GENDER: male  ==============================================================================     MECHANISM OF INJURY:   77 yo male came to [facility] as a transfer from [facility] after a fall backwards from a chair landing on the back of his head. + Headstrike. Unknown LOC. NIH of 4 at , altered and combative, so he was intubated at outside hospital for airway protection. CT Head found large SDH with midline shift and was [place] [facility] [place] and ICU admission. 3% NS at OSH. On arrival to [facility] campus, GCS 3. Sedation turned off. Plan for emergent OR for decompressive craniotomy.     LOC (yes/no?): unknown  Anticoagulant / Anti-platelet Rx? (for what dx?): ASA abd Plavix  Referring Facility Name (N/A for scene EMR run): [facility]     INJURIES:   R subdural hematoma with 1.5 cm Right to left shift     OTHER MEDICAL PROBLEMS:  Prior fracture deformity of L transverse process of L5.  R posterior rib fracture of 12th rib unchanged since scan on 22.     INCIDENTAL FINDINGS:  Densities around left kidney  Ventral hernia containing bowel segment.   Solid lung nodule in Right lower lobe increased in size from previous exam on 21 and now 0.7 X 0.6 cm.     PROCEDURES:  : Right craniotomy for SDH evacuation     Tmax 38.3, HR nml, SBP normal, DBP low  16/500/30/5  7.43/32/84/21.2  2245 UOP, 0.9cc/kg/h, BM   Hypernatremia to 150, hyyperchloremia  Hgb 7.1 (stable)  ==============================================================================  TODAY'S ASSESSMENT AND PLAN OF CARE:  Travis Hunt is a 78 y.o. male in the ICU due to: SDH after fall on plavix/coumadin.     NEURO/PAIN/SEDATION:   # Severe traumatic brain injury and SDH s/p decompressive crainiotomy on  8/24.  - Patient completed his 7-day course of Keppra.  - GCS 11T - only moves toes on left    # Acute pain: Continues on a fentanyl drip while he is intubated, currently 50mg.  Also has Tylenol scheduled.  #Agitation.  Patient is on propofol while intubated, currently 15mg.       RESPIRATORY: #Acute hypoxic respiratory failure secondary to traumatic brain injury.  Currently on AC/VC: 16/500/40/5.    - Daily CXR and ABG as needed     CARDIOVASC:   # H/o CAD, HTN, HLD - s/p coronary stent.  Will restart home atorvastatin 40 mg nightly.  Patient does not take medications for blood pressure at home.  We are holding his home Plavix 75 mg daily and Coumadin 5 mg daily 5 days a week and 2.5 mg 2 days a week.  #Septic Shock  - pneumonia  - Levophed 0.02, Vasopressin 0.03  - 2.2L fluid resusitation  - Levophed started due to hypotension     GI: #History of GERD.    - Discontinue famotidine 20 mg daily.    - Patient has a Dobbhoff in place.  He is receiving Pivot 1.5 at 45 cc/h.    - Bowel regimen with MiraLAX and senna S.  - OG tube in place    :   #History of CKD 3 in setting of renal cell carcinoma.    - Currently around baseline creatinine of ~ 1.12.  Evans in place for strict I's and O's.   - NS @50mL/hr    #History of BPH  Holding home tamsulosin at this point.  Will restart when appropriate       HEMATOLOGIC: # Acute blood loss anemia, stable.  Last received PRBCs on 8/27/2024.  Continue to hold home Plavix and warfarin at this time in setting of head bleed.  Will continue to monitor with daily CBCs.  Will transfuse as appropriate.     ENDOCRINE: No active issues.  Continue with every 4 hour blood glucose checks with sliding scale insulin.  Blood glucose have been between 125 and 177.  Has not received any insulin at this time.      MUSCULOSKELETAL/SKIN: #Skin tear underlying pannus on the right.  Local wound care.  Patient and is on a specialty bed.  Mepilex to bony prominences.  Continue to help patient turn every  2 hours.  - ICU Mobility score: 3     INFECTIOUS DISEASE: # Resolved Leukocytosis.    - Patient does have intermittent febrile episodes.  Likely secondary to central hyperthermia.  - No antibiotic needs at this time.    - BAL with bronchoscopy 8/30  - Started Vanc and cefepime (allergy to penicillins) 8/30; Discontinued Vanc 8/31     GI PROPHYLAXIS: Miralax and Senna  DVT PROPHYLAXIS: SCDs, lovenox 40mg     DISPOSITION: TICU     This patient was seen and discussed with Dr. Dayday Graham, DO  Trauma, Critical Care, [facility] [address]  [phone]/ Epic chat        ==============================================================================  CHIEF COMPLAINT / OVERNIGHT EVENTS / HPI:   Patient continued to have fevers up to 38.5C.  Pressors were adjusted and at Levo 0.04 and Vasopressin 0.03 at signout.     MEDICAL HISTORY / ROS:  Admission history and ROS reviewed. Pertinent changes as follows: None.     PHYSICAL EXAM:  .Heart Rate:  [62-92]   Temp:  [37.4 °C (99.3 °F)-38.7 °C (101.7 °F)]   Resp:  [16-35]   BP: ()/(28-82)   SpO2:  [92 %-100 %]       Physical Exam  Vitals and nursing note reviewed.   Constitutional:       General: He is not in acute distress.     Interventions: He is sedated and intubated.   HENT:      Head:      Comments: Staples in place reflecting s/p R craniotomy; subgaleal drain in place to bulb suction     Nose: Nose normal.      Mouth/Throat:      Comments: Endotracheal tube in place and secured  Eyes:      Conjunctiva/sclera: Conjunctivae normal.      Pupils: Pupils are equal, round, and reactive to light.   Cardiovascular:      Rate and Rhythm: Normal rate and regular rhythm.      Pulses: Normal pulses.   Pulmonary:      Effort: Pulmonary effort is normal. No respiratory distress. He is intubated.      Comments: Mechanically ventilated on AC VC  Abdominal:      Palpations: Abdomen is soft.   Genitourinary:     Comments: Evans catheter in place.  Musculoskeletal:         General: Swelling  present.      Comments: SCDs in place. Significant non-pitting edema to BLE.   Skin:     General: Skin is warm and dry.      Findings: Bruising and lesion present.      Comments: Scattered ecchymoses and bullae on BLE.   Neurological:      Comments: Sedated. Moves RUE and RLE;  withdraw LUE or LLE from pain.

## 2024-08-31 NOTE — PROCEDURES
Insert arterial line    Date/Time: 8/30/2024 9:54 PM    Performed by: Sanford Graham PA-C  Authorized by: Sanford Graham PA-C    Consent:     Consent obtained:  Emergent situation  Universal protocol:     Immediately prior to procedure, a time out was called: yes      Patient identity confirmed:  Arm band and hospital-assigned identification number  Indications:     Indications: hemodynamic monitoring    Pre-procedure details:     Skin preparation:  Chlorhexidine  Sedation:     Sedation type:  None  Anesthesia:     Anesthesia method:  None  Procedure details:     Location:  L radial    Berhane's test performed: yes      Berhane's test abnormal: no      Needle gauge:  20 G    Placement technique:  Ultrasound guided    Number of attempts:  1    Transducer: waveform confirmed    Post-procedure details:     Post-procedure:  Sterile dressing applied    Procedure completion:  Tolerated     Arterial line placement    A time out was performed identifying the correct procedure and correct location with nursing staff.  The L wrist was prepped with 2% Chlorhexidine and draped with a sterile sheet in the usual fashion. 1% lidocaine was administered subcutaneously for local anesthesia. The artery was cannulated and a catheter was placed over the wire with return of pulsatile red blood. The catheter was sutured in place and a sterile dressing was applied to the site. The line flushes and drawsn back blood easily. The catheter was attached to a monitor which revealed an appropriate arterial waveform.     Sanford Graham PA-C  Trauma, Critical Care, and Acute Care Surgery  Ext. [phone]

## 2024-08-31 NOTE — CARE PLAN
Problem: Skin  Goal: Decreased wound size/increased tissue granulation at next dressing change  Outcome: Progressing  Goal: Participates in plan/prevention/treatment measures  Outcome: Progressing  Goal: Prevent/manage excess moisture  Outcome: Progressing  Goal: Prevent/minimize sheer/friction injuries  Outcome: Progressing  Goal: Promote/optimize nutrition  Outcome: Progressing  Goal: Promote skin healing  Outcome: Progressing     Problem: Fall/Injury  Goal: Not fall by end of shift  Outcome: Progressing  Goal: Be free from injury by end of the shift  Outcome: Progressing  Goal: Verbalize understanding of personal risk factors for fall in the hospital  Outcome: Progressing  Goal: Verbalize understanding of risk factor reduction measures to prevent injury from fall in the home  Outcome: Progressing  Goal: Use assistive devices by end of the shift  Outcome: Progressing  Goal: Pace activities to prevent fatigue by end of the shift  Outcome: Progressing     Problem: Pain  Goal: Takes deep breaths with improved pain control throughout the shift  Outcome: Progressing  Goal: Turns in bed with improved pain control throughout the shift  Outcome: Progressing  Goal: Walks with improved pain control throughout the shift  Outcome: Progressing  Goal: Performs ADL's with improved pain control throughout shift  Outcome: Progressing  Goal: Participates in PT with improved pain control throughout the shift  Outcome: Progressing  Goal: Free from opioid side effects throughout the shift  Outcome: Progressing  Goal: Free from acute confusion related to pain meds throughout the shift  Outcome: Progressing     Problem: Safety - Medical Restraint  Goal: Remains free of injury from restraints (Restraint for Interference with Medical Device)  Outcome: Progressing  Goal: Free from restraint(s) (Restraint for Interference with Medical Device)  Outcome: Progressing     Problem: Safety - Adult  Goal: Free from fall injury  Outcome:  Progressing     Problem: Chronic Conditions and Co-morbidities  Goal: Patient's chronic conditions and co-morbidity symptoms are monitored and maintained or improved  Outcome: Progressing   The patient's goals for the shift include      The clinical goals for the shift include Patient's oxygenation will remain sufficient

## 2024-08-31 NOTE — PROGRESS NOTES
[facility]  TRAUMA ICU - ATTENDING PROGRESS NOTE    Patient Name: Travis Hunt  MRN: [id number]  : 1945  AGE: 78 y.o.   GENDER: male  ==============================================================================    2024  11:31 AM    I evaluated and examined the patient with the critical care team. As a multidisciplinary team, we discussed all findings, as well as assessment and plan. I personally spent 35 minutes of critical care time excluding procedures at the bedside with the patient. I personally reviewed all labs, results and studies. My independent note with assessment and plan are below:    Neurologic:        Severe TBI, SDH with midline shift    Decompressive craniotomy   Repeat head CT worsening right frontal edema around IPH   GCS 11T.  Follows on right and only moves toes on left   Goal Na 140-150, 3% NaCl and Q6 hour Na checks   Continue Q1 hour neuro checks   Scheduled tylenol for fever       Analgesia: fentanyl       Sedation: propofol  HEENT:        CHARLIE       C-collar: none  Cardiovascular:        PMHx: CAD, HTN, HLD       Shock, septic  source pneumonia.  2.2L resuscitation. LA 0.9, pH 7.4, appropriate urine output       Vasopressors: levophed 0.02, vasopressin 0.03  Pulmonary:        Acute hypoxic respiratory failure   Tube exchanged , hub disconnected from tube  Oxygen requirement:  CMV 16/500/5/40  Daily CXR  Barrier to extubation is mental status  Gastrointestinal:        Diet: TF via NG tube, at goal       GI prophylaxis: DC pepcid  Renal/:        RCC stage 3       NS@50       BPH, flomax held       Evans catheter: maintain for accurate I/O  Hematologic:       Acute blood loss anemia, transfuse for hemoglobin >7       DVT, PE on coumadin (held and reversed)       Central line: central line       Arterial line: arterial line       DVT prophylaxis: SCD's; lovenox 40mg Q12 (BMI)  Musculoskeletal:       ICU Mobility Score: 3       Skin  breakdown: groin wound, under abdomen, bilateral thigh, unstageable sacral wound present on admission       Restraints: yes, maintain restraints  Endocrine:       CHARLIE       Glucose control <180, POC glucose checks and insulin sliding scale  ID:       Fever  8/30 Bronch and BAL, GPC, likely pneumonia       Antibiotics: DC vancomycin, cefepime (penicillin allergy)    Disposition: The patient remains remains critically ill.    Osmel Naylor MD

## 2024-09-01 ENCOUNTER — APPOINTMENT (OUTPATIENT)
Dept: RADIOLOGY | Facility: HOSPITAL | Age: 79
End: 2024-09-01
Payer: MEDICARE

## 2024-09-01 LAB
ALBUMIN SERPL BCP-MCNC: 2.3 G/DL (ref 3.4–5)
ANION GAP BLDA CALCULATED.4IONS-SCNC: 12 MMO/L (ref 10–25)
ANION GAP SERPL CALC-SCNC: 13 MMOL/L (ref 10–20)
APTT PPP: 30 SECONDS (ref 27–38)
BACTERIA SPEC RESP CULT: ABNORMAL
BASE EXCESS BLDA CALC-SCNC: -1.2 MMOL/L (ref -2–3)
BLOOD EXPIRATION DATE: NORMAL
BODY TEMPERATURE: 37 DEGREES CELSIUS
BUN SERPL-MCNC: 35 MG/DL (ref 6–23)
CA-I BLDA-SCNC: 1.24 MMOL/L (ref 1.1–1.33)
CALCIUM SERPL-MCNC: 7.7 MG/DL (ref 8.6–10.6)
CHLORIDE BLDA-SCNC: 108 MMOL/L (ref 98–107)
CHLORIDE SERPL-SCNC: 108 MMOL/L (ref 98–107)
CHLORIDE UR-SCNC: 62 MMOL/L
CHLORIDE/CREATININE (MMOL/G) IN URINE: 271 MMOL/G CREAT (ref 23–275)
CO2 SERPL-SCNC: 23 MMOL/L (ref 21–32)
CREAT SERPL-MCNC: 1.08 MG/DL (ref 0.5–1.3)
CREAT UR-MCNC: 22.9 MG/DL (ref 20–370)
DISPENSE STATUS: NORMAL
EGFRCR SERPLBLD CKD-EPI 2021: 71 ML/MIN/1.73M*2
ERYTHROCYTE [DISTWIDTH] IN BLOOD BY AUTOMATED COUNT: 15.4 % (ref 11.5–14.5)
ERYTHROCYTE [DISTWIDTH] IN BLOOD BY AUTOMATED COUNT: 15.7 % (ref 11.5–14.5)
GLUCOSE BLD MANUAL STRIP-MCNC: 140 MG/DL (ref 74–99)
GLUCOSE BLD MANUAL STRIP-MCNC: 148 MG/DL (ref 74–99)
GLUCOSE BLD MANUAL STRIP-MCNC: 156 MG/DL (ref 74–99)
GLUCOSE BLD MANUAL STRIP-MCNC: 157 MG/DL (ref 74–99)
GLUCOSE BLD MANUAL STRIP-MCNC: 157 MG/DL (ref 74–99)
GLUCOSE BLD MANUAL STRIP-MCNC: 164 MG/DL (ref 74–99)
GLUCOSE BLDA-MCNC: 167 MG/DL (ref 74–99)
GLUCOSE SERPL-MCNC: 161 MG/DL (ref 74–99)
GRAM STN SPEC: ABNORMAL
HCO3 BLDA-SCNC: 23.6 MMOL/L (ref 22–26)
HCT VFR BLD AUTO: 20.4 % (ref 41–52)
HCT VFR BLD AUTO: 24.4 % (ref 41–52)
HCT VFR BLD EST: 21 % (ref 41–52)
HGB BLD-MCNC: 6.3 G/DL (ref 13.5–17.5)
HGB BLD-MCNC: 7.7 G/DL (ref 13.5–17.5)
HGB BLDA-MCNC: 7 G/DL (ref 13.5–17.5)
INHALED O2 CONCENTRATION: 40 %
INR PPP: 1.2 (ref 0.9–1.1)
LACTATE BLDA-SCNC: 0.6 MMOL/L (ref 0.4–2)
MAGNESIUM SERPL-MCNC: 1.91 MG/DL (ref 1.6–2.4)
MCH RBC QN AUTO: 29.3 PG (ref 26–34)
MCH RBC QN AUTO: 29.3 PG (ref 26–34)
MCHC RBC AUTO-ENTMCNC: 30.9 G/DL (ref 32–36)
MCHC RBC AUTO-ENTMCNC: 31.6 G/DL (ref 32–36)
MCV RBC AUTO: 93 FL (ref 80–100)
MCV RBC AUTO: 95 FL (ref 80–100)
NRBC BLD-RTO: 0 /100 WBCS (ref 0–0)
NRBC BLD-RTO: 0 /100 WBCS (ref 0–0)
OSMOLALITY SERPL: 301 MOSM/KG (ref 280–300)
OXYHGB MFR BLDA: 95.6 % (ref 94–98)
PCO2 BLDA: 39 MM HG (ref 38–42)
PH BLDA: 7.39 PH (ref 7.38–7.42)
PHOSPHATE SERPL-MCNC: 3.1 MG/DL (ref 2.5–4.9)
PLATELET # BLD AUTO: 168 X10*3/UL (ref 150–450)
PLATELET # BLD AUTO: 181 X10*3/UL (ref 150–450)
PO2 BLDA: 93 MM HG (ref 85–95)
POTASSIUM BLDA-SCNC: 4.4 MMOL/L (ref 3.5–5.3)
POTASSIUM SERPL-SCNC: 4.3 MMOL/L (ref 3.5–5.3)
POTASSIUM UR-SCNC: 12 MMOL/L
POTASSIUM/CREAT UR-RTO: 52 MMOL/G CREAT
PRODUCT BLOOD TYPE: 6200
PRODUCT CODE: NORMAL
PROTHROMBIN TIME: 13.8 SECONDS (ref 9.8–12.8)
RBC # BLD AUTO: 2.15 X10*6/UL (ref 4.5–5.9)
RBC # BLD AUTO: 2.63 X10*6/UL (ref 4.5–5.9)
SAO2 % BLDA: 99 % (ref 94–100)
SODIUM BLDA-SCNC: 139 MMOL/L (ref 136–145)
SODIUM SERPL-SCNC: 140 MMOL/L (ref 136–145)
SODIUM UR-SCNC: 54 MMOL/L
SODIUM/CREAT UR-RTO: 236 MMOL/G CREAT
UNIT ABO: NORMAL
UNIT NUMBER: NORMAL
UNIT RH: NORMAL
UNIT VOLUME: 350
WBC # BLD AUTO: 11.2 X10*3/UL (ref 4.4–11.3)
WBC # BLD AUTO: 11.8 X10*3/UL (ref 4.4–11.3)
XM INTEP: NORMAL

## 2024-09-01 PROCEDURE — 83010 ASSAY OF HAPTOGLOBIN QUANT: CPT | Performed by: NURSE PRACTITIONER

## 2024-09-01 PROCEDURE — 83735 ASSAY OF MAGNESIUM: CPT

## 2024-09-01 PROCEDURE — 36430 TRANSFUSION BLD/BLD COMPNT: CPT

## 2024-09-01 PROCEDURE — 71045 X-RAY EXAM CHEST 1 VIEW: CPT | Performed by: RADIOLOGY

## 2024-09-01 PROCEDURE — 2500000002 HC RX 250 W HCPCS SELF ADMINISTERED DRUGS (ALT 637 FOR MEDICARE OP, ALT 636 FOR OP/ED)

## 2024-09-01 PROCEDURE — 2500000004 HC RX 250 GENERAL PHARMACY W/ HCPCS (ALT 636 FOR OP/ED): Performed by: STUDENT IN AN ORGANIZED HEALTH CARE EDUCATION/TRAINING PROGRAM

## 2024-09-01 PROCEDURE — 2500000004 HC RX 250 GENERAL PHARMACY W/ HCPCS (ALT 636 FOR OP/ED): Mod: JZ

## 2024-09-01 PROCEDURE — 2500000001 HC RX 250 WO HCPCS SELF ADMINISTERED DRUGS (ALT 637 FOR MEDICARE OP): Performed by: NURSE PRACTITIONER

## 2024-09-01 PROCEDURE — 94003 VENT MGMT INPAT SUBQ DAY: CPT

## 2024-09-01 PROCEDURE — 82947 ASSAY GLUCOSE BLOOD QUANT: CPT

## 2024-09-01 PROCEDURE — 2500000005 HC RX 250 GENERAL PHARMACY W/O HCPCS

## 2024-09-01 PROCEDURE — 2020000001 HC ICU ROOM DAILY

## 2024-09-01 PROCEDURE — 2500000004 HC RX 250 GENERAL PHARMACY W/ HCPCS (ALT 636 FOR OP/ED): Performed by: NURSE PRACTITIONER

## 2024-09-01 PROCEDURE — 85610 PROTHROMBIN TIME: CPT

## 2024-09-01 PROCEDURE — 71045 X-RAY EXAM CHEST 1 VIEW: CPT

## 2024-09-01 PROCEDURE — P9016 RBC LEUKOCYTES REDUCED: HCPCS

## 2024-09-01 PROCEDURE — 84132 ASSAY OF SERUM POTASSIUM: CPT

## 2024-09-01 PROCEDURE — 83930 ASSAY OF BLOOD OSMOLALITY: CPT | Performed by: STUDENT IN AN ORGANIZED HEALTH CARE EDUCATION/TRAINING PROGRAM

## 2024-09-01 PROCEDURE — 2500000004 HC RX 250 GENERAL PHARMACY W/ HCPCS (ALT 636 FOR OP/ED): Performed by: PHYSICIAN ASSISTANT

## 2024-09-01 PROCEDURE — 82436 ASSAY OF URINE CHLORIDE: CPT

## 2024-09-01 PROCEDURE — 37799 UNLISTED PX VASCULAR SURGERY: CPT

## 2024-09-01 PROCEDURE — 2500000001 HC RX 250 WO HCPCS SELF ADMINISTERED DRUGS (ALT 637 FOR MEDICARE OP): Performed by: PHYSICIAN ASSISTANT

## 2024-09-01 PROCEDURE — 85027 COMPLETE CBC AUTOMATED: CPT

## 2024-09-01 PROCEDURE — 2500000004 HC RX 250 GENERAL PHARMACY W/ HCPCS (ALT 636 FOR OP/ED)

## 2024-09-01 ASSESSMENT — PAIN - FUNCTIONAL ASSESSMENT
PAIN_FUNCTIONAL_ASSESSMENT: CPOT (CRITICAL CARE PAIN OBSERVATION TOOL)

## 2024-09-01 ASSESSMENT — PAIN SCALES - GENERAL
PAINLEVEL_OUTOF10: 0 - NO PAIN

## 2024-09-01 NOTE — PROGRESS NOTES
[facility]  TRAUMA ICU - PROGRESS NOTE     Patient Name: Travis Hunt  MRN: [id number]  Admit Date: 824  : 1945                      AGE: 78 y.o.                             GENDER: male  ==============================================================================     MECHANISM OF INJURY:   77 yo male came to [facility] as a transfer from [facility] after a fall backwards from a chair landing on the back of his head. + Headstrike. Unknown LOC. NIH of 4 at , altered and combative, so he was intubated at outside hospital for airway protection. CT Head found large SDH with midline shift and was transferred to [facility] for neurosurgical intervention and ICU admission. 3% NS at OSH. On arrival to [facility] campus, GCS 3. Sedation turned off. Plan for emergent OR for decompressive craniotomy.     LOC (yes/no?): unknown  Anticoagulant / Anti-platelet Rx? (for what dx?): ASA abd Plavix  Referring Facility Name (N/A for scene EMR run): [facility]     INJURIES:   R subdural hematoma with 1.5 cm Right to left shift     OTHER MEDICAL PROBLEMS:  Prior fracture deformity of L transverse process of L5.  R posterior rib fracture of 12th rib unchanged since scan on 22.     INCIDENTAL FINDINGS:  Densities around left kidney  Ventral hernia containing bowel segment.   Solid lung nodule in Right lower lobe increased in size from previous exam on 21 and now 0.7 X 0.6 cm.     PROCEDURES:  : Right craniotomy for SDH evacuation     Tmax 38.3, HR nml, SBP normal, DBP low  16/500/30/5  7.43/32/84/21.2  2245 UOP, 0.9cc/kg/h, BM   Hypernatremia to 150, hyyperchloremia  Hgb 7.1 (stable)  ==============================================================================  TODAY'S ASSESSMENT AND PLAN OF CARE:  Travis Hunt is a 78 y.o. male in the ICU due to: SDH after fall on plavix/coumadin.     NEURO/PAIN/SEDATION:   # Severe traumatic brain injury and SDH s/p decompressive crainiotomy on  8/24.  - Patient completed his 7-day course of Keppra.  - GCS 11T - only moves toes on left  - Q4hr neuro checks  - Consider Goals of care discussion over next few days for AMS    # Acute pain: Continues on a fentanyl drip while he is intubated, currently 50mg.  Also has Tylenol scheduled.  #Agitation.  Patient is on propofol while intubated, currently 15mg.       RESPIRATORY: #Acute hypoxic respiratory failure secondary to traumatic brain injury.  Currently on AC/VC: 16/500/40/5.    - Daily CXR and ABG as needed     CARDIOVASC:   # H/o CAD, HTN, HLD - s/p coronary stent.  Will restart home atorvastatin 40 mg nightly.  Patient does not take medications for blood pressure at home.    - We are holding his home Plavix 75 mg daily and Coumadin 5 mg daily 5 days a week and 2.5 mg 2 days a week.  - Consider starting Coumadin tomorrow (9/2)  #Septic Shock  - pneumonia  - off pressors  - 2.2L fluid resuscitation  - daily cxr     GI: #History of GERD.    - Discontinue famotidine 20 mg daily.    - Patient has a Dobbhoff in place.  He is receiving Pivot 1.5 at 45 cc/h.    - Bowel regimen with MiraLAX and senna S.  - OG tube in place    :   #History of CKD 3 in setting of renal cell carcinoma.    - Currently around baseline creatinine of ~ 1.12.  Evans in place for strict I's and O's.   - discontinued NS infusion    #History of BPH  Holding home tamsulosin at this point.  Will restart when appropriate       HEMATOLOGIC: # Acute blood loss anemia, stable.  Last received PRBCs on 8/27/2024.  Continue to hold home Plavix and warfarin at this time in setting of head bleed.  Will continue to monitor with daily CBCs.  Will transfuse as appropriate.     ENDOCRINE: No active issues.  Continue with every 4 hour blood glucose checks with sliding scale insulin.  Blood glucose have been between 125 and 177.  Has not received any insulin at this time.      MUSCULOSKELETAL/SKIN: #Skin tear underlying pannus on the right.  Local wound care.   Patient and is on a specialty bed.  Mepilex to bony prominences.  Continue to help patient turn every 2 hours.  - ICU Mobility score: 3     INFECTIOUS DISEASE: # Resolved Leukocytosis.    - Patient does have intermittent febrile episodes.  Likely secondary to central hyperthermia.  - No antibiotic needs at this time.    - BAL with bronchoscopy 8/30  - continue cefepime (allergy to penicillins) (started 8/30) - deescalate as sensitivities     GI PROPHYLAXIS: Miralax and Senna  DVT PROPHYLAXIS: SCDs, lovenox 40mg     DISPOSITION: TICU     This patient was seen and discussed with Dr. Dayday Graham, DO  Trauma, Critical Care, [facility]  [id number]/ Epic chat     ==============================================================================  CHIEF COMPLAINT / OVERNIGHT EVENTS / HPI:   Patient was given 1 unit PRBC for a hemoglobin of 6.3.  He remained stable on pressors     MEDICAL HISTORY / ROS:  Admission history and ROS reviewed. Pertinent changes as follows: None.     PHYSICAL EXAM:  .Heart Rate:  [69-93]   Temp:  [37 °C (98.6 °F)-37.7 °C (99.9 °F)]   Resp:  [17-31]   BP: (109-165)/()   SpO2:  [92 %-100 %]       Physical Exam  Vitals and nursing note reviewed.   Constitutional:       General: He is not in acute distress.     Interventions: He is sedated and intubated.   HENT:      Head:      Comments: Staples in place reflecting s/p R craniotomy; subgaleal drain in place to bulb suction     Nose: Nose normal.      Mouth/Throat:      Comments: Endotracheal tube in place and secured  Eyes:      Conjunctiva/sclera: Conjunctivae normal.      Pupils: Pupils are equal, round, and reactive to light.   Cardiovascular:      Rate and Rhythm: Normal rate and regular rhythm.      Pulses: Normal pulses.   Pulmonary:      Effort: Pulmonary effort is normal. No respiratory distress. He is intubated.      Comments: Mechanically ventilated on AC VC  Abdominal:      Palpations: Abdomen is soft.   Genitourinary:     Comments:  Evans catheter in place.  Musculoskeletal:         General: Swelling present.      Comments: SCDs in place. Significant non-pitting edema to BLE.   Skin:     General: Skin is warm and dry.      Findings: Bruising and lesion present.      Comments: Scattered ecchymoses and bullae on BLE.   Neurological:      Comments: Sedated. Moves RUE and RLE;  withdraw LUE or LLE from pain.

## 2024-09-01 NOTE — PROGRESS NOTES
Travis Hunt is a 78 y.o. male on day 8 of admission presenting with Subdural hematoma (Multi).    - Continue keppra (until 09/01)  - Continue tube feeds at goal  - Follow-up BAL cultures  - Continue vanc/cefepime  - DVT ppx: lovenox 30 mg BID    Patient not medically ready for discharge. SW will continue to follow for progress towards discharge planning needs.

## 2024-09-01 NOTE — PROGRESS NOTES
[facility]  TRAUMA ICU - ATTENDING PROGRESS NOTE    Patient Name: Travis Hunt  MRN: [id number]  : 1945  AGE: 78 y.o.   GENDER: male  ==============================================================================    2024  8:35 AM    I evaluated and examined the patient with the critical care team. As a multidisciplinary team, we discussed all findings, as well as assessment and plan. I personally spent 35 minutes of critical care time excluding procedures at the bedside with the patient. I personally reviewed all labs, results and studies. My independent note with assessment and plan are below:    Neurologic:        Severe TBI, SDH with midline shift    Decompressive craniotomy   Repeat head CT worsening right frontal edema around IPH   GCS 11T.  Follows on right and only moves toes on left   Goal Na 140-150, 3% NaCl and Q6 hour Na checks   Continue Q1 hour neuro checks   Scheduled tylenol for fever   Completed keppra       Analgesia: fentanyl       Sedation: propofol  HEENT: none  Cardiovascular:        PMHx: CAD, HTN, HLD       Shock, septic  source pneumonia.  2.2L resuscitation. LA 0.9, pH 7.4, appropriate urine output       Vasopressors: off AM   Pulmonary:        Acute hypoxic respiratory failure   Tube exchanged , hub disconnected from tube  Oxygen requirement:  CMV 16/500/5/40  Daily CXR  Barrier to extubation is mental status  Gastrointestinal:        Diet: TF via NG tube, at goal       GI prophylaxis: none  Renal/:        RCC stage 3       DC NS       BPH, flomax held       Evans catheter: maintain for accurate I/O  Hematologic:       Acute blood loss anemia, transfuse for hemoglobin >7   1U PRBC        DVT, PE on coumadin (held and reversed), assess this week for restarting therapeutic anticoagulation       Central line: central line       Arterial line: arterial line       DVT prophylaxis: SCD's; lovenox 40mg Q12 (BMI)  Musculoskeletal:        ICU Mobility Score: 3       Skin breakdown: groin wound, under abdomen, bilateral thigh, unstageable sacral wound present on admission       Restraints: yes, maintain restraints  Endocrine:       CHARLIE       Glucose control <180, POC glucose checks and insulin sliding scale  ID:       Fever, resolved  8/30 Bronch and BAL, GPC, likely pneumonia  FU sensitivity       Antibiotics: cefepime (penicillin allergy)    Disposition: The patient remains remains critically ill.    Osmel Naylor MD

## 2024-09-01 NOTE — PROGRESS NOTES
[facility]  TRAUMA SERVICE - PROGRESS NOTE    Patient Name: Travis Hunt  MRN: [id number]  Admit Date: 824  : 1945  AGE: 78 y.o.   GENDER: male  ==============================================================================  MECHANISM OF INJURY:   79 yo male came to [facility] as a transfer from [facility] after a fall backwards from a chair landing on the back of his head. + Headstrike. Unknown LOC. CTH with large SDH with midline shift and was transferred to [facility] for neurosurgical intervention and ICU admission. 3% NS at OSH. On arrival to [facility], GCS 3. S/p emergent OR for decompressive craniotomy.     LOC (yes/no?): unknown  Anticoagulant / Anti-platelet Rx? 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  R posterior rib fracture of 12th rib unchanged since scan on 22.  Densities around left kidney  Ventral hernia containing bowel segment.   Solid lung nodule in Right lower lobe increased in size from previous exam on 21 and now 0.7 X 0.6 cm    PROCEDURES:  : Right craniotomy for hematoma evacuation  : Bronchoscopy    ==============================================================================  TODAY'S ASSESSMENT AND PLAN OF CARE:  79 yo M s/p fall with R SDH on coumadin/ASA/plavix s/p decompressive craniotomy. Repeat CTH with continued blossoming of intraparenchymal hematoma but stable subdural and subarachnoid collections. Last CT head 24 - stable right frontal hematoma with surrounding edema, stable SDH. Now febrile, requiring levophed. Bronchoscopy  with BAL.    - Continue tube feeds at goal  - BAL cultures growing E. Coli and group B strep  - Continue cefepime  (discontinue vanc - MRSA negatice)  - DVT ppx: lovenox 30 mg BID    Discussed with attending surgeon, Dr. Colon.    Elis Rodney MD  PGY4  General Surgery   Trauma Surgery    ==============================================================================  CHIEF COMPLAINT / OVERNIGHT EVENTS:   Receiving 1 upRBC for hemoglobin 6.3.    MEDICAL HISTORY / ROS:  Admission history and ROS reviewed. Pertinent changes as follows:  None    PHYSICAL EXAM:  Heart Rate:  [69-99]   Temp:  [37 °C (98.6 °F)-37.7 °C (99.9 °F)]   Resp:  [17-33]   BP: (109-165)/()   SpO2:  [92 %-100 %]   Physical Exam  Constitutional:       Comments: Sedated   Eyes:      Pupils: Pupils are equal, round, and reactive to light.   Cardiovascular:      Rate and Rhythm: Normal rate and regular rhythm.   Pulmonary:      Effort: No respiratory distress.      Comments: Intubated  Abdominal:      General: There is no distension.      Palpations: Abdomen is soft.      Tenderness: There is no abdominal tenderness.   Skin:     General: Skin is warm and dry.   Neurological:      Comments: GCS 11T.  Follows on right and only moves toes on left.   S/p craniotomy       IMAGING SUMMARY:    CXR 8/31:  Worsening aeration of bilateral lungs with bibasilar consolidation.    LABS:  Results from last 7 days   Lab Units 09/01/24  1050 09/01/24  0132 08/31/24  0331   WBC AUTO x10*3/uL 11.8* 11.2 13.3*   HEMOGLOBIN g/dL 7.7* 6.3* 7.0*   HEMATOCRIT % 24.4* 20.4* 23.1*   PLATELETS AUTO x10*3/uL 181 168 182     Results from last 7 days   Lab Units 09/01/24  0132 08/31/24  0331 08/30/24  0510   APTT seconds 30 29 29   INR  1.2* 1.3* 1.3*     Results from last 7 days   Lab Units 09/01/24  0132 08/31/24  0607 08/31/24  0055   SODIUM mmol/L 140 142 144   POTASSIUM mmol/L 4.3 4.4 3.9   CHLORIDE mmol/L 108* 111* 112*   CO2 mmol/L 23 22 23   BUN mg/dL 35* 35* 32*   CREATININE mg/dL 1.08 1.35* 1.33*   CALCIUM mg/dL 7.7* 7.4* 7.4*   GLUCOSE mg/dL 161* 153* 203*            Results from last 7 days   Lab Units 09/01/24  0131 08/31/24  0332 08/30/24  3286   POCT PH, ARTERIAL pH 7.39 7.41 7.39   POCT PCO2, ARTERIAL mm Hg 39 36* 36*   POCT PO2, ARTERIAL mm Hg 93 77* 294*   POCT HCO3 CALCULATED, ARTERIAL mmol/L 23.6 22.8 21.8*   POCT BASE EXCESS, ARTERIAL mmol/L -1.2 -1.6 -2.9*       I have reviewed all medications, laboratory results, and imaging pertinent for today's encounter.

## 2024-09-02 VITALS
TEMPERATURE: 99.5 F | WEIGHT: 311.07 LBS | OXYGEN SATURATION: 94 % | DIASTOLIC BLOOD PRESSURE: 58 MMHG | SYSTOLIC BLOOD PRESSURE: 122 MMHG | BODY MASS INDEX: 46.07 KG/M2 | HEIGHT: 69 IN | HEART RATE: 79 BPM | RESPIRATION RATE: 21 BRPM

## 2024-09-02 LAB
ALBUMIN SERPL BCP-MCNC: 2.4 G/DL (ref 3.4–5)
ANION GAP BLDA CALCULATED.4IONS-SCNC: 12 MMO/L (ref 10–25)
ANION GAP SERPL CALC-SCNC: 14 MMOL/L (ref 10–20)
APTT PPP: 29 SECONDS (ref 27–38)
BACTERIA SPEC RESP CULT: ABNORMAL
BASE EXCESS BLDA CALC-SCNC: -0.8 MMOL/L (ref -2–3)
BODY TEMPERATURE: 37 DEGREES CELSIUS
BUN SERPL-MCNC: 31 MG/DL (ref 6–23)
CA-I BLDA-SCNC: 1.23 MMOL/L (ref 1.1–1.33)
CALCIUM SERPL-MCNC: 8.2 MG/DL (ref 8.6–10.6)
CHLORIDE BLDA-SCNC: 107 MMOL/L (ref 98–107)
CHLORIDE SERPL-SCNC: 109 MMOL/L (ref 98–107)
CO2 SERPL-SCNC: 24 MMOL/L (ref 21–32)
CREAT SERPL-MCNC: 1.08 MG/DL (ref 0.5–1.3)
EGFRCR SERPLBLD CKD-EPI 2021: 71 ML/MIN/1.73M*2
ERYTHROCYTE [DISTWIDTH] IN BLOOD BY AUTOMATED COUNT: 15.6 % (ref 11.5–14.5)
GLUCOSE BLD MANUAL STRIP-MCNC: 135 MG/DL (ref 74–99)
GLUCOSE BLD MANUAL STRIP-MCNC: 145 MG/DL (ref 74–99)
GLUCOSE BLD MANUAL STRIP-MCNC: 147 MG/DL (ref 74–99)
GLUCOSE BLD MANUAL STRIP-MCNC: 148 MG/DL (ref 74–99)
GLUCOSE BLDA-MCNC: 165 MG/DL (ref 74–99)
GLUCOSE SERPL-MCNC: 159 MG/DL (ref 74–99)
GRAM STN SPEC: ABNORMAL
HCO3 BLDA-SCNC: 24.3 MMOL/L (ref 22–26)
HCT VFR BLD AUTO: 24.2 % (ref 41–52)
HCT VFR BLD EST: 34 % (ref 41–52)
HGB BLD-MCNC: 7.7 G/DL (ref 13.5–17.5)
HGB BLDA-MCNC: 11.3 G/DL (ref 13.5–17.5)
INHALED O2 CONCENTRATION: 40 %
INR PPP: 1.2 (ref 0.9–1.1)
LACTATE BLDA-SCNC: 0.6 MMOL/L (ref 0.4–2)
MAGNESIUM SERPL-MCNC: 2.04 MG/DL (ref 1.6–2.4)
MCH RBC QN AUTO: 29.5 PG (ref 26–34)
MCHC RBC AUTO-ENTMCNC: 31.8 G/DL (ref 32–36)
MCV RBC AUTO: 93 FL (ref 80–100)
NRBC BLD-RTO: 0 /100 WBCS (ref 0–0)
OXYHGB MFR BLDA: 96.4 % (ref 94–98)
PCO2 BLDA: 41 MM HG (ref 38–42)
PH BLDA: 7.38 PH (ref 7.38–7.42)
PHOSPHATE SERPL-MCNC: 3.4 MG/DL (ref 2.5–4.9)
PLATELET # BLD AUTO: 190 X10*3/UL (ref 150–450)
PO2 BLDA: 100 MM HG (ref 85–95)
POTASSIUM BLDA-SCNC: 4.1 MMOL/L (ref 3.5–5.3)
POTASSIUM SERPL-SCNC: 4.2 MMOL/L (ref 3.5–5.3)
PROTHROMBIN TIME: 13.2 SECONDS (ref 9.8–12.8)
RBC # BLD AUTO: 2.61 X10*6/UL (ref 4.5–5.9)
SAO2 % BLDA: 99 % (ref 94–100)
SODIUM BLDA-SCNC: 139 MMOL/L (ref 136–145)
SODIUM SERPL-SCNC: 143 MMOL/L (ref 136–145)
WBC # BLD AUTO: 10.4 X10*3/UL (ref 4.4–11.3)

## 2024-09-02 PROCEDURE — 2500000004 HC RX 250 GENERAL PHARMACY W/ HCPCS (ALT 636 FOR OP/ED)

## 2024-09-02 PROCEDURE — 2020000001 HC ICU ROOM DAILY

## 2024-09-02 PROCEDURE — 2500000004 HC RX 250 GENERAL PHARMACY W/ HCPCS (ALT 636 FOR OP/ED): Mod: JZ

## 2024-09-02 PROCEDURE — 94003 VENT MGMT INPAT SUBQ DAY: CPT

## 2024-09-02 PROCEDURE — 2500000005 HC RX 250 GENERAL PHARMACY W/O HCPCS

## 2024-09-02 PROCEDURE — 2500000004 HC RX 250 GENERAL PHARMACY W/ HCPCS (ALT 636 FOR OP/ED): Performed by: NURSE PRACTITIONER

## 2024-09-02 PROCEDURE — 2500000001 HC RX 250 WO HCPCS SELF ADMINISTERED DRUGS (ALT 637 FOR MEDICARE OP): Performed by: NURSE PRACTITIONER

## 2024-09-02 PROCEDURE — 82947 ASSAY GLUCOSE BLOOD QUANT: CPT

## 2024-09-02 PROCEDURE — 2500000005 HC RX 250 GENERAL PHARMACY W/O HCPCS: Performed by: STUDENT IN AN ORGANIZED HEALTH CARE EDUCATION/TRAINING PROGRAM

## 2024-09-02 PROCEDURE — 2500000004 HC RX 250 GENERAL PHARMACY W/ HCPCS (ALT 636 FOR OP/ED): Performed by: STUDENT IN AN ORGANIZED HEALTH CARE EDUCATION/TRAINING PROGRAM

## 2024-09-02 RX ORDER — DEXMEDETOMIDINE HYDROCHLORIDE 4 UG/ML
.2-1.5 INJECTION, SOLUTION INTRAVENOUS CONTINUOUS
Status: DISCONTINUED | OUTPATIENT
Start: 2024-09-02 | End: 2024-09-09

## 2024-09-02 ASSESSMENT — PAIN - FUNCTIONAL ASSESSMENT
PAIN_FUNCTIONAL_ASSESSMENT: CPOT (CRITICAL CARE PAIN OBSERVATION TOOL)
PAIN_FUNCTIONAL_ASSESSMENT: CPOT (CRITICAL CARE PAIN OBSERVATION TOOL)

## 2024-09-02 NOTE — CARE PLAN
Problem: Skin  Goal: Decreased wound size/increased tissue granulation at next dressing change  Outcome: Progressing  Flowsheets (Taken 8/29/2024 0951 by Angelika Riggs RN)  Decreased wound size/increased tissue granulation at next dressing change:   Promote sleep for wound healing   Protective dressings over bony prominences   Utilize specialty bed per algorithm  Goal: Participates in plan/prevention/treatment measures  Outcome: Progressing  Flowsheets (Taken 8/29/2024 0951 by Angelika Riggs RN)  Participates in plan/prevention/treatment measures:   Discuss with provider PT/OT consult   Increase activity/out of bed for meals   Elevate heels  Goal: Prevent/manage excess moisture  Outcome: Progressing  Flowsheets (Taken 8/29/2024 0951 by Angelika Riggs RN)  Prevent/manage excess moisture:   Cleanse incontinence/protect with barrier cream   Monitor for/manage infection if present   Follow provider orders for dressing changes   Moisturize dry skin  Goal: Prevent/minimize sheer/friction injuries  Outcome: Progressing  Flowsheets (Taken 8/29/2024 0951 by Angelika Riggs RN)  Prevent/minimize sheer/friction injuries:   Increase activity/out of bed for meals   Use pull sheet   Complete micro-shifts as needed if patient unable. Adjust patient position to relieve pressure points, not a full turn   Utilize specialty bed per algorithm   Turn/reposition every 2 hours/use positioning/transfer devices   HOB 30 degrees or less  Goal: Promote/optimize nutrition  Outcome: Progressing  Flowsheets (Taken 8/29/2024 0951 by Angelika Riggs RN)  Promote/optimize nutrition:   Assist with feeding   Monitor/record intake including meals   Offer water/supplements/favorite foods   Consume > 50% meals/supplements   Discuss with provider if NPO > 2 days   Reassess MST if dietician not consulted  Goal: Promote skin healing  Outcome: Progressing  Flowsheets (Taken 8/29/2024 0951 by Angelika Riggs RN)  Promote skin  healing:   Assess skin/pad under line(s)/device(s)   Protective dressings over bony prominences   Turn/reposition every 2 hours/use positioning/transfer devices   Ensure correct size (line/device) and apply per  instructions   Rotate device position/do not position patient on device     Problem: Safety - Medical Restraint  Goal: Remains free of injury from restraints (Restraint for Interference with Medical Device)  Outcome: Progressing  Flowsheets (Taken 9/1/2024 2123)  Remains free of injury from restraints (restraint for interference with medical device):   Determine that other, less restrictive measures have been tried or would not be effective before applying the restraint   Evaluate the patient's condition at the time of restraint application   Inform patient/family regarding the reason for restraint   Every 2 hours: Monitor safety, psychosocial status, comfort, nutrition and hydration  Goal: Free from restraint(s) (Restraint for Interference with Medical Device)  Outcome: Progressing  Flowsheets (Taken 9/1/2024 2123)  Free from restraint(s) (restraint for interference with medical device):   ONCE/SHIFT or MINIMUM Every 12 hours: Assess and document the continuing need for restraints   Every 24 hours: Continued use of restraint requires Licensed Independent Practitioner to perform face to face examination and written order   Identify and implement measures to help patient regain control

## 2024-09-02 NOTE — PROGRESS NOTES
[facility]  TRAUMA ICU - PROGRESS NOTE     Patient Name: Travis Hunt  MRN: [id number]  Admit Date: 824  : 1945                      AGE: 78 y.o.                             GENDER: male  ==============================================================================     MECHANISM OF INJURY:   77 yo male came to [facility] as a transfer from [facility] after a fall backwards from a chair landing on the back of his head. + Headstrike. Unknown LOC. NIH of 4 at , altered and combative, so he was intubated at outside hospital for airway protection. CT Head found large SDH with midline shift and [facility]surgical intervention and ICU admission. 3% NS at OSH. On arrival to [facility], GCS 3. Sedation turned off. Plan for emergent OR for decompressive craniotomy.     LOC (yes/no?): unknown  Anticoagulant / Anti-platelet Rx? (for what dx?): ASA abd Plavix  Referring Facility Name (N/A for scene EMR run): [facility]     INJURIES:   R subdural hematoma with 1.5 cm Right to left shift     OTHER MEDICAL PROBLEMS:  Prior fracture deformity of L transverse process of L5.  R posterior rib fracture of 12th rib unchanged since scan on 22.     INCIDENTAL FINDINGS:  Densities around left kidney  Ventral hernia containing bowel segment.   Solid lung nodule in Right lower lobe increased in size from previous exam on 21 and now 0.7 X 0.6 cm.     PROCEDURES:  : Right craniotomy for SDH evacuation     Tmax 38.3, HR nml, SBP normal, DBP low  16/500/30/5  7.43/32/84/21.2  2245 UOP, 0.9cc/kg/h, BM   Hypernatremia to 150, hyyperchloremia  Hgb 7.1 (stable)  ==============================================================================  TODAY'S ASSESSMENT AND PLAN OF CARE:  Travis Hunt is a 78 y.o. male in the ICU due to: SDH after fall on plavix/coumadin.     NEURO/PAIN/SEDATION:   # Severe traumatic brain injury and SDH s/p decompressive crainiotomy on .   CT head on 8/27/2024 was stable.  Patient completed his 7-day course of Keppra.  Neurosurgery has since signed off.  Will continue every 4 hours neurochecks.  Will continue with elevating head of bed to 30 degrees or more.    # Acute pain: Scheduled Tylenol.    #Agitation while intubated.  Significant improvement in agitation.  Patient currently on propofol 5 mg/h. Will discontinue and start precedex in an effort to help patient wean off vent.      RESPIRATORY: #Acute hypoxic respiratory failure secondary to traumatic brain injury.  Currently on AC/VC: 16/500/40/5.  Daily CXR and ABG as needed     CARDIOVASC:   # H/o CAD, HTN, HLD - s/p coronary stent.  Continue home atorvastatin 40 mg nightly.  Patient does not take medications for blood pressure at home.  We are holding his home Plavix 75 mg daily and coumadin 5 mg daily 5 days a week and 2.5 mg 2 days a week.    #Septic Shock 2/2 pneumonia.  No longer requiring pressors.  BAL with bronchoscopy on August 30.  Cefepime was started at that time.  Cultures growing E. Coli and group B strep.  Will continue with daily and as needed chest x-rays and ABGs while patient is intubated.     GI: #History of GERD.  Discontinue famotidine 20 mg daily.  Patient has a Dobbhoff in place.  He is receiving Pivot 1.5 at 45 cc/h.  Bowel regimen with MiraLAX and senna S.    :   #History of CKD 3 in setting of renal cell carcinoma.  Currently around baseline creatinine of ~ 1.12.  Evans in place for strict I's and O's.  Will continue to monitor with daily RFP.    #History of BPH  Holding home tamsulosin at this point.  Will restart when appropriate next discontinue by chance provide I not interested in doing so  HEMATOLOGIC: # Acute blood loss anemia, stable.  Last received PRBCs on 8/27/2024.  Continue to hold home Plavix and warfarin at this time in setting of head bleed.  Will continue to monitor with daily CBCs.  Will transfuse as appropriate.     ENDOCRINE: No active issues.   Continue with every 4 hour blood glucose checks with sliding scale insulin.  Blood glucose have been between 125 and 177.  Has not received any insulin at this time.    MUSCULOSKELETAL/SKIN: #Skin tear underlying pannus on the right.  Local wound care.  Patient and is on a specialty bed.  Mepilex to bony prominences.  Continue to help patient turn every 2 hours. ICU Mobility score: 3     INFECTIOUS DISEASE: # Resolved Leukocytosis.  Patient does have intermittent febrile episodes.  Likely secondary to central hyperthermia.  BAL with bronchoscopy 8/30.  Cultures growing out Escherichia coli and group B strep.  Continue cefepime (allergy to penicillins) (started 8/30) - deescalate as sensitivities.       GI PROPHYLAXIS: N/A  DVT PROPHYLAXIS: SCDs, lovenox 40mg     DISPOSITION: TSICU     This patient was seen and discussed with Dr. Shelbie Kirkpatrick, APRN-CNP  Trauma, Critical Care, ACS  [id number]/ Epic chat       SheTotal face to face time spent with patient/family of 45 minutes, with >50% of the time spent discussing plan of care/management, counseling/educating on disease processes, explaining results of diagnostic testing.   ==============================================================================  CHIEF COMPLAINT / OVERNIGHT EVENTS / HPI:   No acute events overnight.      MEDICAL HISTORY / ROS:  Admission history and ROS reviewed. Pertinent changes as follows: None.     PHYSICAL EXAM:  .Heart Rate:  [75-99]   Temp:  [35.8 °C (96.4 °F)-37.7 °C (99.9 °F)]   Resp:  [10-33]   BP: ()/()   Weight:  [155 kg (341 lb 14.9 oz)]   SpO2:  [92 %-100 %]       Physical Exam  Vitals and nursing note reviewed.   Constitutional:       General: He is not in acute distress.     Interventions: He is sedated and intubated.   HENT:      Head:      Comments: Staples in place reflecting s/p R craniotomy; subgaleal drain in place to bulb suction     Nose: Nose normal.      Mouth/Throat:      Comments: Endotracheal tube  in place and secured  Eyes:      Conjunctiva/sclera: Conjunctivae normal.      Pupils: Pupils are equal, round, and reactive to light.   Cardiovascular:      Rate and Rhythm: Normal rate and regular rhythm.      Pulses: Normal pulses.   Pulmonary:      Effort: Pulmonary effort is normal. No respiratory distress. He is intubated.      Comments: Mechanically ventilated on AC VC  Abdominal:      Palpations: Abdomen is soft.   Genitourinary:     Comments: Evans catheter in place.  Musculoskeletal:         General: Swelling present.      Comments: SCDs in place. Significant non-pitting edema to BLE.   Skin:     General: Skin is warm and dry.      Findings: Bruising and lesion present.      Comments: Scattered ecchymoses and bullae on BLE.   Neurological:      Comments: Slight opening of eyes. Will spontaneously move RUE and RLE. No significant movement of LUE or LLE today.

## 2024-09-02 NOTE — PROGRESS NOTES
[facility]  TRAUMA SERVICE - PROGRESS NOTE    Patient Name: Travis Hunt  MRN: [id number]  Admit Date: 824  : 1945  AGE: 78 y.o.   GENDER: male  ==============================================================================  MECHANISM OF INJURY:   79 yo male came to [facility] as a transfer from [facility] after a fall backwards from a chair landing on the back of his head. + Headstrike. Unknown LOC. CTH with large SDH with midline shift and was transferred to [facility] for neurosurgical intervention and ICU admission. 3% NS at OSH. On arrival to [facility], GCS 3. S/p emergent OR for decompressive craniotomy.     LOC (yes/no?): unknown  Anticoagulant / Anti-platelet Rx? 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  R posterior rib fracture of 12th rib unchanged since scan on 22.  Densities around left kidney  Ventral hernia containing bowel segment.   Solid lung nodule in Right lower lobe increased in size from previous exam on 21 and now 0.7 X 0.6 cm    PROCEDURES:  : Right craniotomy for hematoma evacuation  : Bronchoscopy    ==============================================================================  TODAY'S ASSESSMENT AND PLAN OF CARE:  79 yo M s/p fall with R SDH on coumadin/ASA/plavix s/p decompressive craniotomy. Repeat CTH with continued blossoming of intraparenchymal hematoma but stable subdural and subarachnoid collections. Last CT head 24 - stable right frontal hematoma with surrounding edema, stable SDH. Now febrile, requiring levophed. Bronchoscopy  with BAL.    - Continue tube feeds at goal  - BAL cultures growing E. Coli and group B strep  - Continue  cefepime  - DVT ppx: lovenox 30 mg BID    Discussed with attending surgeon, Dr. Kent.    Elis Rodney MD  PGY4  General Surgery   Trauma Surgery    ==============================================================================  CHIEF COMPLAINT / OVERNIGHT EVENTS:   No acute overnight events    MEDICAL HISTORY / ROS:  Admission history and ROS reviewed. Pertinent changes as follows:  None    PHYSICAL EXAM:  Heart Rate:  []   Temp:  [35.8 °C (96.4 °F)-37.1 °C (98.8 °F)]   Resp:  [12-37]   BP: ()/()   Weight:  [155 kg (341 lb 14.9 oz)]   SpO2:  [92 %-96 %]   Physical Exam  Constitutional:       Comments: Sedated   Eyes:      Pupils: Pupils are equal, round, and reactive to light.   Cardiovascular:      Rate and Rhythm: Normal rate and regular rhythm.   Pulmonary:      Effort: No respiratory distress.      Comments: Intubated  Abdominal:      General: There is no distension.      Palpations: Abdomen is soft.      Tenderness: There is no abdominal tenderness.   Skin:     General: Skin is warm and dry.   Neurological:      Comments: GCS 11T.  Follows on right and only moves toes on left.   S/p craniotomy       IMAGING SUMMARY:    CXR 8/31:  Worsening aeration of bilateral lungs with bibasilar consolidation.    LABS:  Results from last 7 days   Lab Units 09/01/24 2357 09/01/24  1050 09/01/24  0132   WBC AUTO x10*3/uL 10.4 11.8* 11.2   HEMOGLOBIN g/dL 7.7* 7.7* 6.3*   HEMATOCRIT % 24.2* 24.4* 20.4*   PLATELETS AUTO x10*3/uL 190 181 168     Results from last 7 days   Lab Units 09/01/24 2357 09/01/24  0132 08/31/24  0331   APTT seconds 29 30 29   INR  1.2* 1.2* 1.3*     Results from last 7 days   Lab Units 09/01/24 2357 09/01/24  0132 08/31/24  0607   SODIUM mmol/L 143 140 142   POTASSIUM mmol/L 4.2 4.3 4.4   CHLORIDE mmol/L 109* 108* 111*   CO2 mmol/L 24 23 22   BUN mg/dL 31* 35* 35*   CREATININE mg/dL 1.08 1.08 1.35*   CALCIUM mg/dL 8.2* 7.7* 7.4*   GLUCOSE mg/dL 159* 161* 153*            Results from last 7 days   Lab Units 09/01/24  2358 09/01/24  0131 08/31/24  0332   POCT PH, ARTERIAL pH 7.38 7.39 7.41   POCT PCO2, ARTERIAL mm Hg 41 39 36*   POCT PO2, ARTERIAL mm Hg 100* 93 77*   POCT HCO3 CALCULATED, ARTERIAL mmol/L 24.3 23.6 22.8   POCT BASE EXCESS, ARTERIAL mmol/L -0.8 -1.2 -1.6       I have reviewed all medications, laboratory results, and imaging pertinent for today's encounter.

## 2024-09-02 NOTE — SIGNIFICANT EVENT
RN made trauma team aware of patient following commands in RUE and withdrawls to pain in LUE & RLE but not the LLE in the 0000 re-assessment

## 2024-09-03 LAB
ALBUMIN SERPL BCP-MCNC: 2.2 G/DL (ref 3.4–5)
ALBUMIN SERPL BCP-MCNC: 2.3 G/DL (ref 3.4–5)
ANION GAP BLDA CALCULATED.4IONS-SCNC: 10 MMO/L (ref 10–25)
ANION GAP BLDA CALCULATED.4IONS-SCNC: 9 MMO/L (ref 10–25)
ANION GAP SERPL CALC-SCNC: 12 MMOL/L (ref 10–20)
ANION GAP SERPL CALC-SCNC: 13 MMOL/L (ref 10–20)
APTT PPP: 27 SECONDS (ref 27–38)
BASE EXCESS BLDA CALC-SCNC: 0.2 MMOL/L (ref -2–3)
BASE EXCESS BLDA CALC-SCNC: 0.8 MMOL/L (ref -2–3)
BODY TEMPERATURE: 37 DEGREES CELSIUS
BODY TEMPERATURE: 37 DEGREES CELSIUS
BUN SERPL-MCNC: 34 MG/DL (ref 6–23)
BUN SERPL-MCNC: 35 MG/DL (ref 6–23)
CA-I BLDA-SCNC: 1.22 MMOL/L (ref 1.1–1.33)
CA-I BLDA-SCNC: 1.27 MMOL/L (ref 1.1–1.33)
CALCIUM SERPL-MCNC: 8.2 MG/DL (ref 8.6–10.6)
CALCIUM SERPL-MCNC: 8.4 MG/DL (ref 8.6–10.6)
CHLORIDE BLDA-SCNC: 106 MMOL/L (ref 98–107)
CHLORIDE BLDA-SCNC: 110 MMOL/L (ref 98–107)
CHLORIDE SERPL-SCNC: 108 MMOL/L (ref 98–107)
CHLORIDE SERPL-SCNC: 109 MMOL/L (ref 98–107)
CO2 SERPL-SCNC: 24 MMOL/L (ref 21–32)
CO2 SERPL-SCNC: 26 MMOL/L (ref 21–32)
CREAT SERPL-MCNC: 1.08 MG/DL (ref 0.5–1.3)
CREAT SERPL-MCNC: 1.12 MG/DL (ref 0.5–1.3)
EGFRCR SERPLBLD CKD-EPI 2021: 68 ML/MIN/1.73M*2
EGFRCR SERPLBLD CKD-EPI 2021: 71 ML/MIN/1.73M*2
ERYTHROCYTE [DISTWIDTH] IN BLOOD BY AUTOMATED COUNT: 15.7 % (ref 11.5–14.5)
ERYTHROCYTE [DISTWIDTH] IN BLOOD BY AUTOMATED COUNT: 15.8 % (ref 11.5–14.5)
GLUCOSE BLD MANUAL STRIP-MCNC: 128 MG/DL (ref 74–99)
GLUCOSE BLD MANUAL STRIP-MCNC: 146 MG/DL (ref 74–99)
GLUCOSE BLD MANUAL STRIP-MCNC: 153 MG/DL (ref 74–99)
GLUCOSE BLD MANUAL STRIP-MCNC: 166 MG/DL (ref 74–99)
GLUCOSE BLD MANUAL STRIP-MCNC: 172 MG/DL (ref 74–99)
GLUCOSE BLDA-MCNC: 186 MG/DL (ref 74–99)
GLUCOSE BLDA-MCNC: 190 MG/DL (ref 74–99)
GLUCOSE SERPL-MCNC: 171 MG/DL (ref 74–99)
GLUCOSE SERPL-MCNC: 180 MG/DL (ref 74–99)
HCO3 BLDA-SCNC: 24.6 MMOL/L (ref 22–26)
HCO3 BLDA-SCNC: 25.3 MMOL/L (ref 22–26)
HCT VFR BLD AUTO: 23 % (ref 41–52)
HCT VFR BLD AUTO: 23.8 % (ref 41–52)
HCT VFR BLD EST: 23 % (ref 41–52)
HCT VFR BLD EST: 40 % (ref 41–52)
HGB BLD-MCNC: 7.4 G/DL (ref 13.5–17.5)
HGB BLD-MCNC: 7.7 G/DL (ref 13.5–17.5)
HGB BLDA-MCNC: 13.4 G/DL (ref 13.5–17.5)
HGB BLDA-MCNC: 7.8 G/DL (ref 13.5–17.5)
INHALED O2 CONCENTRATION: 40 %
INHALED O2 CONCENTRATION: 40 %
INR PPP: 1.2 (ref 0.9–1.1)
LACTATE BLDA-SCNC: 0.6 MMOL/L (ref 0.4–2)
LACTATE BLDA-SCNC: 0.8 MMOL/L (ref 0.4–2)
MAGNESIUM SERPL-MCNC: 1.88 MG/DL (ref 1.6–2.4)
MAGNESIUM SERPL-MCNC: 2.01 MG/DL (ref 1.6–2.4)
MCH RBC QN AUTO: 30.3 PG (ref 26–34)
MCH RBC QN AUTO: 30.6 PG (ref 26–34)
MCHC RBC AUTO-ENTMCNC: 32.2 G/DL (ref 32–36)
MCHC RBC AUTO-ENTMCNC: 32.4 G/DL (ref 32–36)
MCV RBC AUTO: 94 FL (ref 80–100)
MCV RBC AUTO: 94 FL (ref 80–100)
NRBC BLD-RTO: 0.1 /100 WBCS (ref 0–0)
NRBC BLD-RTO: 0.2 /100 WBCS (ref 0–0)
OXYHGB MFR BLDA: 96.1 % (ref 94–98)
OXYHGB MFR BLDA: 96.8 % (ref 94–98)
PCO2 BLDA: 38 MM HG (ref 38–42)
PCO2 BLDA: 39 MM HG (ref 38–42)
PH BLDA: 7.42 PH (ref 7.38–7.42)
PH BLDA: 7.42 PH (ref 7.38–7.42)
PHOSPHATE SERPL-MCNC: 3.5 MG/DL (ref 2.5–4.9)
PHOSPHATE SERPL-MCNC: 3.5 MG/DL (ref 2.5–4.9)
PLATELET # BLD AUTO: 215 X10*3/UL (ref 150–450)
PLATELET # BLD AUTO: 261 X10*3/UL (ref 150–450)
PO2 BLDA: 102 MM HG (ref 85–95)
PO2 BLDA: 97 MM HG (ref 85–95)
POTASSIUM BLDA-SCNC: 3.9 MMOL/L (ref 3.5–5.3)
POTASSIUM BLDA-SCNC: 4.4 MMOL/L (ref 3.5–5.3)
POTASSIUM SERPL-SCNC: 4 MMOL/L (ref 3.5–5.3)
POTASSIUM SERPL-SCNC: 4.4 MMOL/L (ref 3.5–5.3)
PROTHROMBIN TIME: 13.1 SECONDS (ref 9.8–12.8)
RBC # BLD AUTO: 2.44 X10*6/UL (ref 4.5–5.9)
RBC # BLD AUTO: 2.52 X10*6/UL (ref 4.5–5.9)
SAO2 % BLDA: 99 % (ref 94–100)
SAO2 % BLDA: 99 % (ref 94–100)
SODIUM BLDA-SCNC: 136 MMOL/L (ref 136–145)
SODIUM BLDA-SCNC: 140 MMOL/L (ref 136–145)
SODIUM SERPL-SCNC: 142 MMOL/L (ref 136–145)
SODIUM SERPL-SCNC: 142 MMOL/L (ref 136–145)
WBC # BLD AUTO: 10 X10*3/UL (ref 4.4–11.3)
WBC # BLD AUTO: 13.5 X10*3/UL (ref 4.4–11.3)

## 2024-09-03 PROCEDURE — 2500000004 HC RX 250 GENERAL PHARMACY W/ HCPCS (ALT 636 FOR OP/ED): Mod: JZ

## 2024-09-03 PROCEDURE — 2500000005 HC RX 250 GENERAL PHARMACY W/O HCPCS: Performed by: STUDENT IN AN ORGANIZED HEALTH CARE EDUCATION/TRAINING PROGRAM

## 2024-09-03 PROCEDURE — 99291 CRITICAL CARE FIRST HOUR: CPT | Performed by: STUDENT IN AN ORGANIZED HEALTH CARE EDUCATION/TRAINING PROGRAM

## 2024-09-03 PROCEDURE — 85027 COMPLETE CBC AUTOMATED: CPT

## 2024-09-03 PROCEDURE — 2500000004 HC RX 250 GENERAL PHARMACY W/ HCPCS (ALT 636 FOR OP/ED)

## 2024-09-03 PROCEDURE — 71045 X-RAY EXAM CHEST 1 VIEW: CPT | Performed by: RADIOLOGY

## 2024-09-03 PROCEDURE — 84132 ASSAY OF SERUM POTASSIUM: CPT

## 2024-09-03 PROCEDURE — 83735 ASSAY OF MAGNESIUM: CPT

## 2024-09-03 PROCEDURE — 2020000001 HC ICU ROOM DAILY

## 2024-09-03 PROCEDURE — 94003 VENT MGMT INPAT SUBQ DAY: CPT

## 2024-09-03 PROCEDURE — 82947 ASSAY GLUCOSE BLOOD QUANT: CPT

## 2024-09-03 PROCEDURE — 2500000001 HC RX 250 WO HCPCS SELF ADMINISTERED DRUGS (ALT 637 FOR MEDICARE OP)

## 2024-09-03 PROCEDURE — 2500000002 HC RX 250 W HCPCS SELF ADMINISTERED DRUGS (ALT 637 FOR MEDICARE OP, ALT 636 FOR OP/ED)

## 2024-09-03 PROCEDURE — 85610 PROTHROMBIN TIME: CPT

## 2024-09-03 PROCEDURE — 84132 ASSAY OF SERUM POTASSIUM: CPT | Performed by: STUDENT IN AN ORGANIZED HEALTH CARE EDUCATION/TRAINING PROGRAM

## 2024-09-03 PROCEDURE — 2500000001 HC RX 250 WO HCPCS SELF ADMINISTERED DRUGS (ALT 637 FOR MEDICARE OP): Performed by: NURSE PRACTITIONER

## 2024-09-03 PROCEDURE — 85027 COMPLETE CBC AUTOMATED: CPT | Performed by: STUDENT IN AN ORGANIZED HEALTH CARE EDUCATION/TRAINING PROGRAM

## 2024-09-03 PROCEDURE — 37799 UNLISTED PX VASCULAR SURGERY: CPT

## 2024-09-03 PROCEDURE — 94799 UNLISTED PULMONARY SVC/PX: CPT

## 2024-09-03 PROCEDURE — 70450 CT HEAD/BRAIN W/O DYE: CPT | Performed by: RADIOLOGY

## 2024-09-03 PROCEDURE — 2500000005 HC RX 250 GENERAL PHARMACY W/O HCPCS

## 2024-09-03 PROCEDURE — 97530 THERAPEUTIC ACTIVITIES: CPT | Mod: GP

## 2024-09-03 ASSESSMENT — COGNITIVE AND FUNCTIONAL STATUS - GENERAL
MOVING FROM LYING ON BACK TO SITTING ON SIDE OF FLAT BED WITH BEDRAILS: TOTAL
CLIMB 3 TO 5 STEPS WITH RAILING: TOTAL
WALKING IN HOSPITAL ROOM: TOTAL
STANDING UP FROM CHAIR USING ARMS: TOTAL
MOBILITY SCORE: 6
TURNING FROM BACK TO SIDE WHILE IN FLAT BAD: TOTAL
MOVING TO AND FROM BED TO CHAIR: TOTAL

## 2024-09-03 ASSESSMENT — PAIN - FUNCTIONAL ASSESSMENT
PAIN_FUNCTIONAL_ASSESSMENT: CPOT (CRITICAL CARE PAIN OBSERVATION TOOL)

## 2024-09-03 ASSESSMENT — PAIN SCALES - PAIN ASSESSMENT IN ADVANCED DEMENTIA (PAINAD): TOTALSCORE: MEDICATION (SEE MAR)

## 2024-09-03 ASSESSMENT — PAIN SCALES - GENERAL: PAINLEVEL_OUTOF10: 0 - NO PAIN

## 2024-09-03 NOTE — PROGRESS NOTES
[facility]  TRAUMA ICU - PROGRESS NOTE     Patient Name: Travis Hunt  MRN: [id number]  Admit Date: 824  : 1945                      AGE: 78 y.o.                             GENDER: male  ==============================================================================     MECHANISM OF INJURY:   79 yo male came to [facility] as a transfer from [facility] after a fall backwards from a chair landing on the back of his head. + Headstrike. Unknown LOC. NIH of 4 at , altered and combative, so he was intubated at outside hospital for airway protection. CT Head found large SDH with midline shift and was transferred to [place] for neurosurgical intervention and ICU admission. 3% NS at OSH. On arrival to [facility] campus, GCS 3. Sedation turned off. Plan for emergent OR for decompressive craniotomy.     LOC (yes/no?): unknown  Anticoagulant / Anti-platelet Rx? (for what dx?): ASA abd Plavix  Referring Facility Name (N/A for scene EMR run): [facility]     INJURIES:   R subdural hematoma with 1.5 cm Right to left shift     OTHER MEDICAL PROBLEMS:  Prior fracture deformity of L transverse process of L5.  R posterior rib fracture of 12th rib unchanged since scan on 22.     INCIDENTAL FINDINGS:  Densities around left kidney  Ventral hernia containing bowel segment.   Solid lung nodule in Right lower lobe increased in size from previous exam on 21 and now 0.7 X 0.6 cm.     PROCEDURES:  : Right craniotomy for SDH evacuation     Tmax 38.3, HR nml, SBP normal, DBP low  16/500/30/5  7.43/32/84/21.2  2245 UOP, 0.9cc/kg/h, BM   Hypernatremia to 150, hyyperchloremia  Hgb 7.1 (stable)  ==============================================================================  TODAY'S ASSESSMENT AND PLAN OF CARE:  Travis Hunt is a 78 y.o. male in the ICU due to: SDH after fall on plavix/coumadin.     NEURO/PAIN/SEDATION:   # Severe traumatic brain injury and SDH s/p decompressive crainiotomy on  8/24  - CT head on 8/27/2024 was stable  - 7 day keppra course completed   - Q4 hr neurochecks   - HOB elevated 30 degrees or more  - NSGY signed off      # Acute pain  -Scheduled Tylenol    #Agitation while intubated  - Significant improvement in agitation  - weaned off of prop, on low dose fentanyl   - on CPAP vent settings: 40% PEEP 5     RESPIRATORY: #Acute hypoxic respiratory failure secondary to traumatic brain injury  - tolerating CPAP   - daily CXR and ABG as needed      CARDIOVASC:   # H/o CAD, HTN, HLD - s/p coronary stent.   - Continue home atorvastatin 40 mg nightly  - holding home plavix and coumadin (home dosing Plavix 75 mg daily and coumadin 5 mg daily 5 days a week and 2.5 mg 2 days a week)  - no home medications for HTN     #Septic Shock 2/2 pneumonia  - on levo 0.02, weaning off   - BAL with bronchoscopy on August 30     - Cefepime was started at that time due to PCN allergy     - Cultures growing E. Coli and group B strep.  Will continue with   - continue cefepime for total of 5-7 days (9/4 is day 5)    GI: #History of GERD  - Discontinue famotidine 20 mg daily  - Patient has a Dobbhoff in place, receiving Pivot 1.5 at 45 cc/h.    - Bowel regimen with MiraLAX and senna S.    :   #History of CKD 3 in setting of renal cell carcinoma.    - Currently around baseline creatinine of ~ 1.12  - Evans in place for strict I's and O's.    - daily RFP    #History of BPH  - Holding home tamsulosin at this point, will restart when appropriate next    HEMATOLOGIC: # Acute blood loss anemia, stable  - Last received PRBCs on 8/27/2024.  Continue to hold home Plavix and warfarin at this time in setting of head bleed.   - continue to monitor CBC, transfuse as appropriate      ENDOCRINE: No active issues  - q 4hr glucose checks  - SSI, has not required any SSI in the past 24 hrs     MUSCULOSKELETAL/SKIN: #Skin tear underlying pannus on the right  - Local wound care  - Patient and is on a specialty bed  - Mepilex to  bony prominences.  Continue to help patient turn every 2 hours. ICU Mobility score: 3     INFECTIOUS DISEASE: # Resolved Leukocytosis  - Patient does have intermittent febrile episodes, likely secondary to central hyperthermia  - BAL with bronchoscopy 8/30    - Cultures growing out Escherichia coli and group B strep    - Continue cefepime (allergy to penicillins) (started 8/30), continue for total of 5-7 day course      GI PROPHYLAXIS: N/A  DVT PROPHYLAXIS: SCDs, lovenox 40mg     DISPOSITION: TSICU     Patient was seen and discussed with Dr. Morfin    Total face to face time spent with patient/family of 60 minutes, with >50% of the time spent discussing plan of care/management, counseling/educating on disease processes, explaining results of diagnostic testing.     Mar Matta PA-C  Trauma, Critical Care, Acute Care Surgery   Floor: [id number]  TSICU: [id number]   ==============================================================================  CHIEF COMPLAINT / OVERNIGHT EVENTS / HPI:   No acute events overnight, pt this morning with intermittent spontaneous movement to right upper extremity. Not following commands.    On assessment later in the room, pt was following a couple of commands. Assessed again later with wife in the room, pt was not able to follow commands, flexion withdrawing in extremities. GCS 6T (E1V1M4)      MEDICAL HISTORY / ROS:  Admission history and ROS reviewed. Pertinent changes as follows: None.     PHYSICAL EXAM:  .Heart Rate:  []   Temp:  [36.5 °C (97.7 °F)-37.1 °C (98.8 °F)]   Resp:  [21-38]   BP: (105-156)/(32-72)   SpO2:  [92 %-97 %]       Physical Exam  Vitals and nursing note reviewed.   Constitutional:       General: He is not in acute distress.     Interventions: He is sedated and intubated.   HENT:      Head:      Comments: Staples in place reflecting s/p R craniotomy; subgaleal drain in place to bulb suction     Nose: Nose normal.      Mouth/Throat:      Comments: Endotracheal tube in  place and secured  Eyes:      Conjunctiva/sclera: Conjunctivae normal.      Pupils: Pupils are equal, round, and reactive to light.   Cardiovascular:      Rate and Rhythm: Normal rate and regular rhythm.      Pulses: Normal pulses.   Pulmonary:      Effort: Pulmonary effort is normal. No respiratory distress. He is intubated.      Comments: On CPAP settings 40% PEEP 5   Abdominal:      Palpations: Abdomen is soft.      Tube feeds running at 45mL/hr  Genitourinary:     Comments: Evans catheter in place.  Musculoskeletal:         General: Swelling present.      Comments: SCDs in place. Significant non-pitting edema to BLE.   Skin:     General: Skin is warm and dry.      Findings: Bruising and lesion present.      Comments: Scattered ecchymoses and bullae on BLE.   Neurological:      Comments: Spontaneously moves right upper extremity, no other movement appreciated. GCS 6T (E1V1M4)

## 2024-09-03 NOTE — PROGRESS NOTES
Physical Therapy    Physical Therapy Treatment    Patient Name: Travis Hunt  MRN: [id number]  Today's Date: 9/3/2024  Room: 19/19  Time Calculation  Start Time: 1057  Stop Time: 1115  Time Calculation (min): 18 min       Assessment/Plan   PT Assessment  End of Session Communication: Bedside nurse  Assessment Comment: Pt demonstrated improved R hemibody command following this session, RN notified and observed RUE and RLE command following. Pt continues to benefit from skilled PT to address ongoing deficits.  End of Session Patient Position: Bed, 3 rail up (B wrist restraints secured)  PT Plan  Treatment/Interventions: Bed mobility, Transfer training, Gait training, Balance training, Strengthening, Endurance training, Range of motion, Therapeutic exercise, Therapeutic activity, Positioning, Postural re-education  PT Plan: Ongoing PT  PT Frequency: 2 times per week  PT Discharge Recommendations: Moderate intensity level of continued care  Equipment Recommended upon Discharge:  (TBD)  PT Recommended Transfer Status: Total assist  PT - OK to Discharge: Yes  PT Plan  Treatment/Interventions: Bed mobility, Transfer training, Gait training, Balance training, Strengthening, Endurance training, Range of motion, Therapeutic exercise, Therapeutic activity, Positioning, Postural re-education  PT Plan: Ongoing PT  PT Frequency: 2 times per week  PT Discharge Recommendations: Moderate intensity level of continued care  Equipment Recommended upon Discharge:  (TBD)  PT Recommended Transfer Status: Total assist  PT - OK to Discharge: Yes      General Visit Information:   PT  Visit  PT Received On: 09/03/24  Prior to Session Communication: Bedside nurse, Physician  Patient Position Received: Bed, 3 rail up (B wrist restraints)  General Comment: Per RN, no command following on this date. Off all sedation, weaning fent down actively, on 30 fent (CPAP)     Subjective     Precautions:  Precautions  Medical Precautions: Fall precautions,  "Oxygen therapy device and L/min  Vital Signs:  Vital Signs (Past 2hrs)        Date/Time Vitals Session Patient Position Pulse Resp SpO2 BP MAP (mmHg)    09/03/24 1057 Pre PT  --  112  34  94 %  147/50  --     09/03/24 1100 --  --  98  34  93 %  --  --     09/03/24 1115 Post PT  --  97  35  94 %  151/47  --                     Objective   Pain:  Pain Assessment  Pain Assessment: CPOT (Critical Care Pain Observation Tool)  0-10 (Numeric) Pain Score: 0 - No pain  Cognition:  Cognition  Overall Cognitive Status: Within Functional Limits  Arousal/Alertness:  (opens L eye slightly with forward lean)  Orientation Level: Unable to assess  Following Commands:  (Pt able to follow commands in R ravinder body. Pt able to squeeze and release R hand on command and give \"peace\" sign on command in RUE. Pt able to wiggle R toes on command. No command following in L hemibody or with visual tracking)  Cognition Comments: Pt spontaneously moving RUE throughout session  Lines/Tubes/Drains:  CVC 08/26/24 Triple lumen Non-tunneled Right Internal jugular (Active)   Number of days: 7       Arterial Line 08/30/24 Left Radial (Active)   Number of days: 3       ETT  7.5 mm (Active)   Number of days: 4       Urethral Catheter Straight-tip 16 Fr. (Active)   Number of days:        NG/OG/Feeding Tube OG - [place] sump Center mouth (Active)   Number of days: 4     Medical Gas Therapy: Supplemental oxygen  Medical Gas Delivery Method: Endotracheal tube  Continuous Medications/Drips:  dexmedeTOMIDine, 0.2-1.5 mcg/kg/hr, Last Rate: Stopped (09/02/24 1740)  fentaNYL,  mcg/hr, Last Rate: Stopped (09/03/24 1130)  norepinephrine, 0-0.5 mcg/kg/min, Last Rate: Stopped (09/03/24 0345)  vasopressin, 0.03 Units/min, Last Rate: Stopped (09/03/24 0646)        PT Treatments:    Bed Mobility  Bed Mobility: Yes  Bed Mobility 1  Bed Mobility 1: Forward lean  Level of Assistance 1: Dependent (x2)  Bed Mobility Comments 1: ~1 minute to assess head control and " alertness, pt able to open L eye with forward leaning, unable to track to voice or turn head appropriately       Outcome Measures:  [facility] Basic Mobility  Turning from your back to your side while in a flat bed without using bedrails: Total  Moving from lying on your back to sitting on the side of a flat bed without using bedrails: Total  Moving to and from bed to chair (including a wheelchair): Total  Standing up from a chair using your arms (e.g. wheelchair or bedside chair): Total  To walk in hospital room: Total  Climbing 3-5 steps with railing: Total  Basic Mobility - Total Score: 6           FSS-ICU  Ambulation: Unable to attempt due to weakness  Rolling: Total assistance (performs 25% or requires another person)  Sitting: Unable to perform  Transfer Sit-to-Stand: Unable to perform  Transfer Supine-to-Sit: Unable to perform  Total Score: 1  ICU Mobility Screen  Early Mobility/Exercise Safety Screen: Proceed with mobilization - No exclusion criteria met  ICU Mobility Scale: Sitting in bed, exercises in bed             Education Documentation  Precautions, taught by Dayanara Carr PT at 9/3/2024 12:41 PM.  Learner: Patient  Readiness: Acceptance  Method: Explanation  Response: Needs Reinforcement    Body Mechanics, taught by Dayanara Carr PT at 9/3/2024 12:41 PM.  Learner: Patient  Readiness: Acceptance  Method: Explanation  Response: Needs Reinforcement    Mobility Training, taught by Dayanara Carr PT at 9/3/2024 12:41 PM.  Learner: Patient  Readiness: Acceptance  Method: Explanation  Response: Needs Reinforcement    Education Comments  No comments found.          OP EDUCATION:       Encounter Problems       Encounter Problems (Active)       Balance       Pt will sit EOB >5 minutes with maxA x 1 (Progressing)       Start:  08/28/24    Expected End:  09/11/24               Mobility       Pt will follow >50% of body centered commands  (Progressing)       Start:  08/28/24    Expected End:  09/11/24                PT Transfers       STG - Patient to transfer to and from sit to supine maxA  (Progressing)       Start:  08/28/24    Expected End:  09/11/24            STG - Patient will transfer sit to and from stand maxA  (Progressing)       Start:  08/28/24    Expected End:  09/11/24               Pain - Adult                  09/03/24 at 12:42 PM   Dayanara Carr, PT   Rehab Office: [phone]

## 2024-09-03 NOTE — PROGRESS NOTES
[facility]  TRAUMA SERVICE - PROGRESS NOTE    Patient Name: Travis Hunt  MRN: [id number]  Admit Date: 824  : 1945  AGE: 78 y.o.   GENDER: male  ==============================================================================  MECHANISM OF INJURY:   79 yo male came to [facility] as a transfer from [facility] after a fall backwards from a chair landing on the back of his head. + Headstrike. Unknown LOC. CTH with large SDH with midline shift and was transferred to [facility] [place] neurosurgical intervention and ICU admission. 3% NS at OSH. On arrival to [facility] campus, GCS 3. S/p emergent OR for decompressive craniotomy.     LOC (yes/no?): unknown  Anticoagulant / Anti-platelet Rx? (for what dx?): coumadin/ASA, plavix  Referring Facility Name (N/A for scene EMR run): [facility]    INJURIES:   R SDH with 1.5 cm midline shift    OTHER MEDICAL PROBLEMS:  HTN  HLD  CAD  PE/DVTs (on coumadin)  Renal cell carcinoma  VAP    INCIDENTAL FINDINGS:  Prior fracture deformity of L transverse process of L5.  R posterior rib fracture of 12th rib unchanged since scan on 22.  Densities around left kidney  Ventral hernia containing bowel segment.   Solid lung nodule in Right lower lobe increased in size from previous exam on 21 and now 0.7 X 0.6 cm    PROCEDURES:  : Right craniotomy for hematoma evacuation  : Bronchoscopy    ==============================================================================  TODAY'S ASSESSMENT AND PLAN OF CARE:  79 yo M s/p fall with R SDH on coumadin/ASA/plavix s/p decompressive craniotomy. Repeat CTH with continued blossoming of intraparenchymal hematoma but stable subdural and subarachnoid collections. Last CT head 24 - stable right frontal hematoma with surrounding edema, stable SDH. Now febrile, requiring levophed. Bronchoscopy  with BAL.    - Continue tube feeds at goal  - BAL cultures growing E. Coli and group B strep  - Continue  cefepime  - Neurosurgery recommending MRI and MRV  - DVT ppx: lovenox 30 mg BID    Discussed with attending surgeon, Dr. Kent.    Elis Rodney MD  PGY4  General Surgery   Trauma Surgery    ==============================================================================  CHIEF COMPLAINT / OVERNIGHT EVENTS:   No acute overnight events    MEDICAL HISTORY / ROS:  Admission history and ROS reviewed. Pertinent changes as follows:  None    PHYSICAL EXAM:  Heart Rate:  []   Temp:  [36.5 °C (97.7 °F)-37.6 °C (99.7 °F)]   Resp:  [20-38]   BP: (105-151)/(32-72)   SpO2:  [91 %-100 %]   Physical Exam  Constitutional:       Comments: Sedated   Eyes:      Pupils: Pupils are equal, round, and reactive to light.   Cardiovascular:      Rate and Rhythm: Normal rate and regular rhythm.   Pulmonary:      Effort: No respiratory distress.      Comments: Intubated  Abdominal:      General: There is no distension.      Palpations: Abdomen is soft.      Tenderness: There is no abdominal tenderness.   Skin:     General: Skin is warm and dry.   Neurological:      Comments: GCS 11T.  Follows on right and only moves toes on left.   S/p craniotomy       IMAGING SUMMARY:    [facility] 9/3: Evolution of cranial contusion, no new bleeds.    LABS:  Results from last 7 days   Lab Units 09/03/24  0024 09/01/24 2357 09/01/24  1050   WBC AUTO x10*3/uL 10.0 10.4 11.8*   HEMOGLOBIN g/dL 7.4* 7.7* 7.7*   HEMATOCRIT % 23.0* 24.2* 24.4*   PLATELETS AUTO x10*3/uL 215 190 181     Results from last 7 days   Lab Units 09/01/24  2357 09/01/24  0132 08/31/24  0331   APTT seconds 29 30 29   INR  1.2* 1.2* 1.3*     Results from last 7 days   Lab Units 09/03/24  0024 09/01/24 2357 09/01/24  0132   SODIUM mmol/L 142 143 140   POTASSIUM mmol/L 4.4 4.2 4.3   CHLORIDE mmol/L 109* 109* 108*   CO2 mmol/L 24 24 23   BUN mg/dL 34* 31* 35*   CREATININE mg/dL 1.08 1.08 1.08   CALCIUM mg/dL 8.2* 8.2* 7.7*   GLUCOSE mg/dL 180* 159* 161*           Results from last 7 days    Lab Units 09/03/24  0028 09/01/24  2358 09/01/24  0131   POCT PH, ARTERIAL pH 7.42 7.38 7.39   POCT PCO2, ARTERIAL mm Hg 38 41 39   POCT PO2, ARTERIAL mm Hg 97* 100* 93   POCT HCO3 CALCULATED, ARTERIAL mmol/L 24.6 24.3 23.6   POCT BASE EXCESS, ARTERIAL mmol/L 0.2 -0.8 -1.2       I have reviewed all medications, laboratory results, and imaging pertinent for today's encounter.

## 2024-09-03 NOTE — SIGNIFICANT EVENT
Neurosurgery evaluation:    Was notified by the trauma surgery team of a new CT head obtained on this patient.  He has been suffering from sepsis pneumonia with bacteremia for the past few days.  He has been on pressors, Abx and aggressive volume resuscitation.  Today he had some improvement in his mental status and neurologic exam.  A CT head was obtained for surveillance.  The new CT showed interval evolution of the right frontal and new right temporal hypodensity concerning for evolving contusions with glenda-lesional edema.  There is stable to slightly worsened anterior frontal edema with some local midline shift.  There is no concern for effacement of basal cisterns.    Patient was examined and he has stable neurologic exam to the last time that was seen by neurosurgery team    Eyes open to noxious stim  Right upper and lower extremity follows command  Left upper and lower extremity withdraws to painful stimuli  Pupils 3 mm round equal and reactive  Incision well-approximated with staples in place and no signs of dehiscence or drainage or erythema    Recs:  Recommend obtaining MRI brain with and without contrast as well as MRV brain to rule out any intracranial infection or venous sinus thrombosis.     If MRI cannot be obtained in a timely fashion please obtain repeat noncontrast CT head 6 hours from prior scan    Recommend maintaining systolic blood pressure less than 160    Recommend maintaining Plt > 100 and INR < 1.6    Neurosurgery team will continue to follow

## 2024-09-03 NOTE — PROGRESS NOTES
PLAN:  ICU due to: SDH after fall on plavix/coumadin.   -remains intubated/sedated   - q4 neuro checks   - serial exams with symptom management  -TF  -skin, line, ICU tlc      READMISSION? No      PAYOR: O Medicare      DISCHARGE NEED: LOC TBD by remaining hospital course      BARRIER: medical      SOCIAL: Renu (haseeb) [phone]

## 2024-09-03 NOTE — CARE PLAN
Problem: Skin  Goal: Decreased wound size/increased tissue granulation at next dressing change  Outcome: Progressing  Goal: Participates in plan/prevention/treatment measures  Outcome: Progressing  Goal: Prevent/manage excess moisture  Outcome: Progressing  Goal: Prevent/minimize sheer/friction injuries  Outcome: Progressing  Goal: Promote/optimize nutrition  Outcome: Progressing  Goal: Promote skin healing  Outcome: Progressing     Problem: Fall/Injury  Goal: Not fall by end of shift  Outcome: Progressing  Goal: Be free from injury by end of the shift  Outcome: Progressing  Goal: Verbalize understanding of personal risk factors for fall in the hospital  Outcome: Progressing  Goal: Verbalize understanding of risk factor reduction measures to prevent injury from fall in the home  Outcome: Progressing  Goal: Use assistive devices by end of the shift  Outcome: Progressing  Goal: Pace activities to prevent fatigue by end of the shift  Outcome: Progressing     Problem: Pain  Goal: Takes deep breaths with improved pain control throughout the shift  Outcome: Progressing  Goal: Turns in bed with improved pain control throughout the shift  Outcome: Progressing  Goal: Walks with improved pain control throughout the shift  Outcome: Progressing  Goal: Performs ADL's with improved pain control throughout shift  Outcome: Progressing  Goal: Participates in PT with improved pain control throughout the shift  Outcome: Progressing  Goal: Free from opioid side effects throughout the shift  Outcome: Progressing  Goal: Free from acute confusion related to pain meds throughout the shift  Outcome: Progressing     Problem: Safety - Medical Restraint  Goal: Remains free of injury from restraints (Restraint for Interference with Medical Device)  Outcome: Progressing  Goal: Free from restraint(s) (Restraint for Interference with Medical Device)  Outcome: Progressing     Problem: Pain - Adult  Goal: Verbalizes/displays adequate comfort level  or baseline comfort level  Outcome: Progressing     Problem: Safety - Adult  Goal: Free from fall injury  Outcome: Progressing     Problem: Discharge Planning  Goal: Discharge to home or other facility with appropriate resources  Outcome: Progressing     Problem: Chronic Conditions and Co-morbidities  Goal: Patient's chronic conditions and co-morbidity symptoms are monitored and maintained or improved  Outcome: Progressing     Problem: Knowledge Deficit  Goal: Patient/family/caregiver demonstrates understanding of disease process, treatment plan, medications, and discharge instructions  Outcome: Progressing     Problem: Mechanical Ventilation  Goal: Patient Will Maintain Patent Airway  Outcome: Progressing  Goal: Oral health is maintained or improved  Outcome: Progressing  Goal: Tracheostomy will be managed safely  Outcome: Progressing  Goal: ET tube will be managed safely  Outcome: Progressing  Goal: Ability to express needs and understand communication  Outcome: Progressing  Goal: Mobility/activity is maintained at optimum level for patient  Outcome: Progressing

## 2024-09-04 LAB
ALBUMIN SERPL BCP-MCNC: 2.6 G/DL (ref 3.4–5)
ANION GAP BLDA CALCULATED.4IONS-SCNC: 11 MMO/L (ref 10–25)
ANION GAP BLDA CALCULATED.4IONS-SCNC: 9 MMO/L (ref 10–25)
ANION GAP SERPL CALC-SCNC: 12 MMOL/L (ref 10–20)
APPEARANCE UR: ABNORMAL
BASE EXCESS BLDA CALC-SCNC: -0.5 MMOL/L (ref -2–3)
BASE EXCESS BLDA CALC-SCNC: -0.7 MMOL/L (ref -2–3)
BILIRUB UR STRIP.AUTO-MCNC: NEGATIVE MG/DL
BODY TEMPERATURE: 37 DEGREES CELSIUS
BODY TEMPERATURE: 37 DEGREES CELSIUS
BUN SERPL-MCNC: 35 MG/DL (ref 6–23)
CA-I BLD-SCNC: 1.23 MMOL/L (ref 1.1–1.33)
CA-I BLDA-SCNC: 1.16 MMOL/L (ref 1.1–1.33)
CA-I BLDA-SCNC: 1.2 MMOL/L (ref 1.1–1.33)
CALCIUM SERPL-MCNC: 9.5 MG/DL (ref 8.6–10.6)
CHLORIDE BLDA-SCNC: 107 MMOL/L (ref 98–107)
CHLORIDE BLDA-SCNC: 108 MMOL/L (ref 98–107)
CHLORIDE SERPL-SCNC: 107 MMOL/L (ref 98–107)
CO2 SERPL-SCNC: 26 MMOL/L (ref 21–32)
COLOR UR: YELLOW
CORTIS SERPL-MCNC: 16.5 UG/DL (ref 2.5–20)
CREAT SERPL-MCNC: 1.15 MG/DL (ref 0.5–1.3)
EGFRCR SERPLBLD CKD-EPI 2021: 66 ML/MIN/1.73M*2
ERYTHROCYTE [DISTWIDTH] IN BLOOD BY AUTOMATED COUNT: 16 % (ref 11.5–14.5)
GLUCOSE BLD MANUAL STRIP-MCNC: 131 MG/DL (ref 74–99)
GLUCOSE BLD MANUAL STRIP-MCNC: 148 MG/DL (ref 74–99)
GLUCOSE BLD MANUAL STRIP-MCNC: 154 MG/DL (ref 74–99)
GLUCOSE BLD MANUAL STRIP-MCNC: 161 MG/DL (ref 74–99)
GLUCOSE BLD MANUAL STRIP-MCNC: 167 MG/DL (ref 74–99)
GLUCOSE BLD MANUAL STRIP-MCNC: 178 MG/DL (ref 74–99)
GLUCOSE BLDA-MCNC: 134 MG/DL (ref 74–99)
GLUCOSE BLDA-MCNC: 171 MG/DL (ref 74–99)
GLUCOSE SERPL-MCNC: 148 MG/DL (ref 74–99)
GLUCOSE UR STRIP.AUTO-MCNC: NORMAL MG/DL
HCO3 BLDA-SCNC: 22.8 MMOL/L (ref 22–26)
HCO3 BLDA-SCNC: 23.1 MMOL/L (ref 22–26)
HCT VFR BLD AUTO: 22.5 % (ref 41–52)
HCT VFR BLD EST: 26 % (ref 41–52)
HCT VFR BLD EST: 38 % (ref 41–52)
HGB BLD-MCNC: 7.7 G/DL (ref 13.5–17.5)
HGB BLDA-MCNC: 12.8 G/DL (ref 13.5–17.5)
HGB BLDA-MCNC: 8.6 G/DL (ref 13.5–17.5)
HYALINE CASTS #/AREA URNS AUTO: ABNORMAL /LPF
INHALED O2 CONCENTRATION: 40 %
INHALED O2 CONCENTRATION: 40 %
KETONES UR STRIP.AUTO-MCNC: ABNORMAL MG/DL
LACTATE BLDA-SCNC: 0.8 MMOL/L (ref 0.4–2)
LACTATE BLDA-SCNC: 1 MMOL/L (ref 0.4–2)
LEUKOCYTE ESTERASE UR QL STRIP.AUTO: ABNORMAL
MAGNESIUM SERPL-MCNC: 1.99 MG/DL (ref 1.6–2.4)
MCH RBC QN AUTO: 31.8 PG (ref 26–34)
MCHC RBC AUTO-ENTMCNC: 34.2 G/DL (ref 32–36)
MCV RBC AUTO: 93 FL (ref 80–100)
MUCOUS THREADS #/AREA URNS AUTO: ABNORMAL /LPF
NITRITE UR QL STRIP.AUTO: NEGATIVE
NRBC BLD-RTO: 0.1 /100 WBCS (ref 0–0)
OXYHGB MFR BLDA: 95.7 % (ref 94–98)
OXYHGB MFR BLDA: 96.2 % (ref 94–98)
PCO2 BLDA: 32 MM HG (ref 38–42)
PCO2 BLDA: 34 MM HG (ref 38–42)
PH BLDA: 7.44 PH (ref 7.38–7.42)
PH BLDA: 7.46 PH (ref 7.38–7.42)
PH UR STRIP.AUTO: 6 [PH]
PHOSPHATE SERPL-MCNC: 4.1 MG/DL (ref 2.5–4.9)
PLATELET # BLD AUTO: 260 X10*3/UL (ref 150–450)
PO2 BLDA: 109 MM HG (ref 85–95)
PO2 BLDA: 96 MM HG (ref 85–95)
POTASSIUM BLDA-SCNC: 3.7 MMOL/L (ref 3.5–5.3)
POTASSIUM BLDA-SCNC: 3.9 MMOL/L (ref 3.5–5.3)
POTASSIUM SERPL-SCNC: 4.1 MMOL/L (ref 3.5–5.3)
PROT UR STRIP.AUTO-MCNC: ABNORMAL MG/DL
RBC # BLD AUTO: 2.42 X10*6/UL (ref 4.5–5.9)
RBC # UR STRIP.AUTO: ABNORMAL /UL
RBC #/AREA URNS AUTO: >20 /HPF
SAO2 % BLDA: 99 % (ref 94–100)
SAO2 % BLDA: 99 % (ref 94–100)
SODIUM BLDA-SCNC: 136 MMOL/L (ref 136–145)
SODIUM BLDA-SCNC: 137 MMOL/L (ref 136–145)
SODIUM SERPL-SCNC: 141 MMOL/L (ref 136–145)
SP GR UR STRIP.AUTO: 1.02
SQUAMOUS #/AREA URNS AUTO: ABNORMAL /HPF
UROBILINOGEN UR STRIP.AUTO-MCNC: NORMAL MG/DL
WBC # BLD AUTO: 14 X10*3/UL (ref 4.4–11.3)
WBC #/AREA URNS AUTO: ABNORMAL /HPF

## 2024-09-04 PROCEDURE — 36415 COLL VENOUS BLD VENIPUNCTURE: CPT | Performed by: STUDENT IN AN ORGANIZED HEALTH CARE EDUCATION/TRAINING PROGRAM

## 2024-09-04 PROCEDURE — 87086 URINE CULTURE/COLONY COUNT: CPT | Performed by: PHYSICIAN ASSISTANT

## 2024-09-04 PROCEDURE — 84132 ASSAY OF SERUM POTASSIUM: CPT

## 2024-09-04 PROCEDURE — 94799 UNLISTED PULMONARY SVC/PX: CPT

## 2024-09-04 PROCEDURE — 82330 ASSAY OF CALCIUM: CPT | Performed by: NURSE PRACTITIONER

## 2024-09-04 PROCEDURE — 37799 UNLISTED PX VASCULAR SURGERY: CPT | Performed by: PHYSICIAN ASSISTANT

## 2024-09-04 PROCEDURE — 2500000005 HC RX 250 GENERAL PHARMACY W/O HCPCS

## 2024-09-04 PROCEDURE — 2020000001 HC ICU ROOM DAILY

## 2024-09-04 PROCEDURE — 2500000004 HC RX 250 GENERAL PHARMACY W/ HCPCS (ALT 636 FOR OP/ED): Performed by: PHYSICIAN ASSISTANT

## 2024-09-04 PROCEDURE — 2500000004 HC RX 250 GENERAL PHARMACY W/ HCPCS (ALT 636 FOR OP/ED): Performed by: NURSE PRACTITIONER

## 2024-09-04 PROCEDURE — 2500000004 HC RX 250 GENERAL PHARMACY W/ HCPCS (ALT 636 FOR OP/ED)

## 2024-09-04 PROCEDURE — 37799 UNLISTED PX VASCULAR SURGERY: CPT | Performed by: NURSE PRACTITIONER

## 2024-09-04 PROCEDURE — 2500000005 HC RX 250 GENERAL PHARMACY W/O HCPCS: Performed by: STUDENT IN AN ORGANIZED HEALTH CARE EDUCATION/TRAINING PROGRAM

## 2024-09-04 PROCEDURE — 87077 CULTURE AEROBIC IDENTIFY: CPT | Performed by: STUDENT IN AN ORGANIZED HEALTH CARE EDUCATION/TRAINING PROGRAM

## 2024-09-04 PROCEDURE — 2500000002 HC RX 250 W HCPCS SELF ADMINISTERED DRUGS (ALT 637 FOR MEDICARE OP, ALT 636 FOR OP/ED)

## 2024-09-04 PROCEDURE — 82533 TOTAL CORTISOL: CPT | Performed by: PHYSICIAN ASSISTANT

## 2024-09-04 PROCEDURE — 82947 ASSAY GLUCOSE BLOOD QUANT: CPT

## 2024-09-04 PROCEDURE — 2500000004 HC RX 250 GENERAL PHARMACY W/ HCPCS (ALT 636 FOR OP/ED): Mod: JZ

## 2024-09-04 PROCEDURE — 2500000001 HC RX 250 WO HCPCS SELF ADMINISTERED DRUGS (ALT 637 FOR MEDICARE OP): Performed by: NURSE PRACTITIONER

## 2024-09-04 PROCEDURE — 94003 VENT MGMT INPAT SUBQ DAY: CPT

## 2024-09-04 PROCEDURE — 83735 ASSAY OF MAGNESIUM: CPT

## 2024-09-04 PROCEDURE — 2500000001 HC RX 250 WO HCPCS SELF ADMINISTERED DRUGS (ALT 637 FOR MEDICARE OP): Performed by: PHYSICIAN ASSISTANT

## 2024-09-04 PROCEDURE — 85027 COMPLETE CBC AUTOMATED: CPT

## 2024-09-04 PROCEDURE — 87040 BLOOD CULTURE FOR BACTERIA: CPT | Performed by: STUDENT IN AN ORGANIZED HEALTH CARE EDUCATION/TRAINING PROGRAM

## 2024-09-04 PROCEDURE — 81001 URINALYSIS AUTO W/SCOPE: CPT | Performed by: PHYSICIAN ASSISTANT

## 2024-09-04 PROCEDURE — 71045 X-RAY EXAM CHEST 1 VIEW: CPT | Performed by: STUDENT IN AN ORGANIZED HEALTH CARE EDUCATION/TRAINING PROGRAM

## 2024-09-04 PROCEDURE — 2500000004 HC RX 250 GENERAL PHARMACY W/ HCPCS (ALT 636 FOR OP/ED): Performed by: STUDENT IN AN ORGANIZED HEALTH CARE EDUCATION/TRAINING PROGRAM

## 2024-09-04 RX ORDER — NOREPINEPHRINE BITARTRATE/D5W 8 MG/250ML
0-.5 PLASTIC BAG, INJECTION (ML) INTRAVENOUS CONTINUOUS
Status: DISCONTINUED | OUTPATIENT
Start: 2024-09-04 | End: 2024-09-13 | Stop reason: HOSPADM

## 2024-09-04 RX ORDER — PHENYLEPHRINE HCL IN 0.9% NACL 0.4MG/10ML
SYRINGE (ML) INTRAVENOUS
Status: DISPENSED
Start: 2024-09-04 | End: 2024-09-05

## 2024-09-04 RX ORDER — VANCOMYCIN HYDROCHLORIDE 1 G/20ML
INJECTION, POWDER, LYOPHILIZED, FOR SOLUTION INTRAVENOUS DAILY PRN
Status: DISCONTINUED | OUTPATIENT
Start: 2024-09-04 | End: 2024-09-11

## 2024-09-04 RX ORDER — HALOPERIDOL 5 MG/ML
INJECTION INTRAMUSCULAR
Status: COMPLETED
Start: 2024-09-04 | End: 2024-09-04

## 2024-09-04 RX ORDER — CEFEPIME 1 G/50ML
2 INJECTION, SOLUTION INTRAVENOUS EVERY 8 HOURS
Status: DISCONTINUED | OUTPATIENT
Start: 2024-09-04 | End: 2024-09-05

## 2024-09-04 RX ORDER — HALOPERIDOL 5 MG/ML
5 INJECTION INTRAMUSCULAR ONCE
Status: COMPLETED | OUTPATIENT
Start: 2024-09-04 | End: 2024-09-04

## 2024-09-04 RX ORDER — PANTOPRAZOLE SODIUM 40 MG/10ML
40 INJECTION, POWDER, LYOPHILIZED, FOR SOLUTION INTRAVENOUS DAILY
Status: DISCONTINUED | OUTPATIENT
Start: 2024-09-04 | End: 2024-09-11

## 2024-09-04 ASSESSMENT — PAIN - FUNCTIONAL ASSESSMENT
PAIN_FUNCTIONAL_ASSESSMENT: CPOT (CRITICAL CARE PAIN OBSERVATION TOOL)

## 2024-09-04 NOTE — PROGRESS NOTES
"Nutrition Follow Up Assessment:   Nutrition Assessment       Patient is a 78 y.o. male presenting to ICU on 8/24 s/p R crani for SDH evac, CTH w/ improvement of SDH and MLS, incre RF contusion and SDH, r cTH cont blossoming of RF cont, rrCTH stable, C/f sepsis. 9/3 CTH worsening edema, rCTH stable. Remains intubated.    Nutrition History:  Food and Nutrient History: Patient has been receiving Isosource 1.5 @ 45ml/hr. He was switched from Pivot to Isourcce on 8/31- unclear reason. TF was off during RD visit. Pt now on Levo. Spoke to RN - pt tolerating TF well.     Anthropometrics:  Height: 175.3 cm (5' 9.02\")   Weight: (!) 155 kg (341 lb 14.9 oz)   BMI (Calculated): 50.47  IBW/kg (Dietitian Calculated): 72.7 kg  Percent of IBW: 198 %     Weight History:   Weights (last 14 days)  Date/Time Weight   09/02/24 0300 155 kg (341 lb 14.9 oz) Abnormal    08/30/24 0600 141 kg (311 lb 1.1 oz)   08/25/24 0200 144 kg (317 lb 15.9 oz)   Admit Weight: 144 kg (317 lb 15.9 oz)     Weight Change %:  Weight History / % Weight Change: Weight is up 11kg from admission  Significant Weight Gain: Fluid related    Nutrition Focused Physical Exam Findings:  Subcutaneous Fat Loss:   Orbital Fat Pads: Mild-Moderate (slight dark circles and slight hollowing)  Buccal Fat Pads: Well nourished (full, rounded cheeks)  Triceps: Defer  Ribs: Well nourished (chest is full, ribs do not show, slight to no protrusion of the iliac crest)  Muscle Wasting:  Temporalis: Well nourished (well-defined muscle)  Pectoralis (Clavicular Region): Well nourished (clavicle not visible)  Deltoid/Trapezius: Well nourished (rounded appearance at arm, shoulder, neck)  Interosseous: Defer  Trapezius/Infraspinatus/Supraspinatus (Scapular Region): Defer  Quadriceps: Well nourished (well developed, well rounded)  Gastrocnemius: Defer  Edema:  Edema: +2 mild, +3 moderate  Edema Location: generalized; RUE  Physical Findings:  Skin: Positive (wounds: R+L pretibia proximal, " buttocks, R leg, hand, surgical site to head, DTPI to L ischium and R ischium)    Nutrition Significant Labs:  CBC Trend:   Results from last 7 days   Lab Units 09/04/24 0344 09/03/24  1819 09/03/24 0024 09/01/24  2357   WBC AUTO x10*3/uL 14.0* 13.5* 10.0 10.4   RBC AUTO x10*6/uL 2.42* 2.52* 2.44* 2.61*   HEMOGLOBIN g/dL 7.7* 7.7* 7.4* 7.7*   HEMATOCRIT % 22.5* 23.8* 23.0* 24.2*   MCV fL 93 94 94 93   PLATELETS AUTO x10*3/uL 260 261 215 190    , BMP Trend:   Results from last 7 days   Lab Units 09/04/24 0344 09/03/24 1821 09/03/24 0024 09/01/24  2357   GLUCOSE mg/dL 148* 171* 180* 159*   CALCIUM mg/dL 9.5 8.4* 8.2* 8.2*   SODIUM mmol/L 141 142 142 143   POTASSIUM mmol/L 4.1 4.0 4.4 4.2   CO2 mmol/L 26 26 24 24   CHLORIDE mmol/L 107 108* 109* 109*   BUN mg/dL 35* 35* 34* 31*   CREATININE mg/dL 1.15 1.12 1.08 1.08    , Liver Function Trend:    , Renal Lab Trend:   Results from last 7 days   Lab Units 09/04/24 0344 09/03/24 1821 09/03/24 0024 09/01/24  2357   POTASSIUM mmol/L 4.1 4.0 4.4 4.2   PHOSPHORUS mg/dL 4.1 3.5 3.5 3.4   SODIUM mmol/L 141 142 142 143   MAGNESIUM mg/dL 1.99 1.88 2.01 2.04   EGFR mL/min/1.73m*2 66 68 71 71   BUN mg/dL 35* 35* 34* 31*   CREATININE mg/dL 1.15 1.12 1.08 1.08        Nutrition Specific Medications:  Scheduled medications  atorvastatin, 40 mg, oral, Nightly  cefepime, 1 g, intravenous, q8h  insulin lispro, 0-5 Units, subcutaneous, q4h  polyethylene glycol, 17 g, oral, Daily  sennosides-docusate sodium, 1 tablet, oral, Nightly    Continuous medications  dexmedeTOMIDine, 0.2-1.5 mcg/kg/hr, Last Rate: Stopped (09/02/24 1740)  fentaNYL,  mcg/hr, Last Rate: Stopped (09/03/24 1130)  norepinephrine, 0-0.5 mcg/kg/min, Last Rate: 0.1 mcg/kg/min (09/04/24 0930)  vasopressin, 0.03 Units/min, Last Rate: Stopped (09/03/24 0646)      PRN medications  PRN medications: acetaminophen, alteplase, lubricating eye drops      I/O:   Last BM Date: 09/03/24; Stool Appearance: Loose (09/03/24  0400)    Dietary Orders (From admission, onward)       Start     Ordered    08/31/24 2057  Enteral feeding with NPO Isosource 1.5; 45  Diet effective now        Question Answer Comment   Tube feeding formula: Isosource 1.5    Tube feeding cyclic rate (mL/hr): 45        08/31/24 2057                     Estimated Needs:   Total Energy Estimated Needs (kCal):  (8218-9239)  Method for Estimating Needs: 11-14kcal/kg x 144kg (25kcal/kg IBW = 1818 / PSU REE (MV 14L/min) =2456)  Total Protein Estimated Needs (g): 145 g  Method for Estimating Needs: ~ 2.0g/kg IBW  Total Fluid Estimated Needs (mL):  (per MD/team)  Method for Estimating Needs: per MD /team      *Pt with 3+ edema on admission with admission weight of 144kg. Energy needs assessed with both admission weight and IBW d/t unknown dry weight.     Nutrition Diagnosis   Malnutrition Diagnosis  Patient has Malnutrition Diagnosis: No    Nutrition Diagnosis  Patient has Nutrition Diagnosis: Yes  Diagnosis Status (1): Ongoing  Nutrition Diagnosis 1: Increased nutrient needs  Related to (1): increased metabolic demand  As Evidenced by (1): s/p hemicranietcomy, critical illness, vent support       Nutrition Interventions/Recommendations         Nutrition Prescription:  Individualized Nutrition Prescription Provided for : enteral nutrition  Recommend switching to Pivot 1.5 d/t increased protein needs- pivot also lower in fiber and appropriate while requiring pressors  Pivot 1.5 @ 40ml/hr + 3 pkt prostat daily   When appropriate restart trickle feeds- TF advancement with pressor requirements per MD discretion   FWF per MD/team discretion          Nutrition Interventions:   Interventions: Enteral intake  Enteral Intake: Modify composition of enteral nutrition  Goal: change to Pivot 1.5 @ 40ml/hr + 3 pkt prostat    TF = 1740kcal, 147gm protein, and 705ml free water     Nutrition Monitoring and Evaluation   Food/Nutrient Related History Monitoring  Monitoring and Evaluation  Plan: Enteral and parenteral nutrition intake  Enteral and Parenteral Nutrition Intake: Enteral nutrition intake  Criteria: tolerate TF @ goal         Biochemical Data, Medical Tests and Procedures  Monitoring and Evaluation Plan: Electrolyte/renal panel, Glucose/endocrine profile  Electrolyte and Renal Panel: Magnesium, Phosphorus, Potassium  Criteria: lytes WNL  Glucose/Endocrine Profile: Glucose, casual  Criteria: 140-180mg/dL    Nutrition Focused Physical Findings  Monitoring and Evaluation Plan: Skin  Skin: Impaired wound healing  Criteria: improved wound healing         Time Spent (min): 45 minutes

## 2024-09-04 NOTE — PROGRESS NOTES
[facility]  TRAUMA SERVICE - PROGRESS NOTE    Patient Name: Travis Hunt  MRN: [id number]  Admit Date: 824  : 1945  AGE: 78 y.o.   GENDER: male  ==============================================================================  MECHANISM OF INJURY:   77 yo male came [facility] a transfer from [facility] after a fall backwards from a chair landing on the back of his head. + Headstrike. Unknown LOC. CTH with large SDH with midline shift and was transferred to [facility] for neurosurgical intervention and ICU admission. 3% NS at OSH. On arrival to [facility] campus, GCS 3. S/p emergent OR for decompressive craniotomy.     LOC (yes/no?): unknown  Anticoagulant / Anti-platelet Rx? (for what dx?): coumadin/ASA, plavix  Referring Facility Name (N/A for scene EMR run): [facility]    INJURIES:   R SDH with 1.5 cm midline shift    OTHER MEDICAL PROBLEMS:  HTN  HLD  CAD  PE/DVTs (on coumadin)  Renal cell carcinoma  VAP    INCIDENTAL FINDINGS:  Prior fracture deformity of L transverse process of L5.  R posterior rib fracture of 12th rib unchanged since scan on 22.  Densities around left kidney  Ventral hernia containing bowel segment.   Solid lung nodule in Right lower lobe increased in size from previous exam on 21 and now 0.7 X 0.6 cm    PROCEDURES:  : Right craniotomy for hematoma evacuation  : Bronchoscopy    ==============================================================================  TODAY'S ASSESSMENT AND PLAN OF CARE:  77 yo M s/p fall with R SDH on coumadin/ASA/plavix s/p decompressive craniotomy. Repeat CTH with continued blossoming of intraparenchymal hematoma but stable subdural and subarachnoid collections. Last CT head 24 - stable right frontal hematoma with surrounding edema, stable SDH. Now febrile, requiring levophed. Bronchoscopy  with BAL.    - Continue tube feeds at goal  - BAL cultures growing E. Coli and group B strep  - Continue  cefepime  - Neurosurgery recommending MRI and MRV  - Will discuss trach/PEG with family  - DVT ppx: lovenox 30 mg BID    Discussed with attending surgeon, Dr. Kent.    Elis Rodney MD  PGY4  General Surgery   Trauma Surgery    ==============================================================================  CHIEF COMPLAINT / OVERNIGHT EVENTS:   No acute overnight events    MEDICAL HISTORY / ROS:  Admission history and ROS reviewed. Pertinent changes as follows:  None    PHYSICAL EXAM:  Heart Rate:  []   Temp:  [35.7 °C (96.3 °F)-38.1 °C (100.6 °F)]   Resp:  [18-44]   BP: (108-122)/(27-56)   SpO2:  [91 %-100 %]   Physical Exam  Constitutional:       Comments: Sedated   Eyes:      Pupils: Pupils are equal, round, and reactive to light.   Cardiovascular:      Rate and Rhythm: Normal rate and regular rhythm.   Pulmonary:      Effort: No respiratory distress.      Comments: Intubated  Abdominal:      General: There is no distension.      Palpations: Abdomen is soft.      Tenderness: There is no abdominal tenderness.   Skin:     General: Skin is warm and dry.   Neurological:      Comments: Spontaneously moves on right        IMAGING SUMMARY:    No new imaging.    LABS:  Results from last 7 days   Lab Units 09/04/24  0344 09/03/24  1819 09/03/24  0024   WBC AUTO x10*3/uL 14.0* 13.5* 10.0   HEMOGLOBIN g/dL 7.7* 7.7* 7.4*   HEMATOCRIT % 22.5* 23.8* 23.0*   PLATELETS AUTO x10*3/uL 260 261 215     Results from last 7 days   Lab Units 09/03/24  1821 09/01/24  2357 09/01/24  0132   APTT seconds 27 29 30   INR  1.2* 1.2* 1.2*     Results from last 7 days   Lab Units 09/04/24  0344 09/03/24  1821 09/03/24  0024   SODIUM mmol/L 141 142 142   POTASSIUM mmol/L 4.1 4.0 4.4   CHLORIDE mmol/L 107 108* 109*   CO2 mmol/L 26 26 24   BUN mg/dL 35* 35* 34*   CREATININE mg/dL 1.15 1.12 1.08   CALCIUM mg/dL 9.5 8.4* 8.2*   GLUCOSE mg/dL 148* 171* 180*           Results from last 7 days   Lab Units 09/04/24  0343 09/03/24  1827  09/03/24  0028   POCT PH, ARTERIAL pH 7.44* 7.42 7.42   POCT PCO2, ARTERIAL mm Hg 34* 39 38   POCT PO2, ARTERIAL mm Hg 96* 102* 97*   POCT HCO3 CALCULATED, ARTERIAL mmol/L 23.1 25.3 24.6   POCT BASE EXCESS, ARTERIAL mmol/L -0.5 0.8 0.2       I have reviewed all medications, laboratory results, and imaging pertinent for today's encounter.

## 2024-09-04 NOTE — PROGRESS NOTES
[facility]  TRAUMA ICU - PROGRESS NOTE     Patient Name: Travis Hunt  MRN: [id number]  Admit Date: 824  : 1945                      AGE: 78 y.o.                             GENDER: male  ==============================================================================     MECHANISM OF INJURY:   79 yo male came to [facility] as a transfer from [facility] after a fall backwards from a chair landing on the back of his head. + Headstrike. Unknown LOC. NIH of 4 at , altered and combative, so he was intubated at outside hospital for airway protection. CT Head found large SDH with midline shift and was [place] [facility] [place] intervention and ICU admission. 3% NS at OSH. On arrival to [facility], GCS 3. Sedation turned off. Plan for emergent OR for decompressive craniotomy.     LOC (yes/no?): unknown  Anticoagulant / Anti-platelet Rx? (for what dx?): ASA abd Plavix  Referring Facility Name (N/A for scene EMR run): [facility]     INJURIES:   R subdural hematoma with 1.5 cm Right to left shift     OTHER MEDICAL PROBLEMS:  Prior fracture deformity of L transverse process of L5.  R posterior rib fracture of 12th rib unchanged since scan on 22.     INCIDENTAL FINDINGS:  Densities around left kidney  Ventral hernia containing bowel segment.   Solid lung nodule in Right lower lobe increased in size from previous exam on 21 and now 0.7 X 0.6 cm.     PROCEDURES:  : Right craniotomy for SDH evacuation     Tmax 38.3, HR nml, SBP normal, DBP low  16/500/30/5  7.43/32/84/21.2  2245 UOP, 0.9cc/kg/h, BM   Hypernatremia to 150, hyyperchloremia  Hgb 7.1 (stable)  ==============================================================================  TODAY'S ASSESSMENT AND PLAN OF CARE:  Travis Hunt is a 78 y.o. male in the ICU due to: SDH after fall on plavix/coumadin.     NEURO/PAIN/SEDATION:   # Severe traumatic brain injury and SDH s/p decompressive crainiotomy on  8/24  - CT head on 09/03/2024 was stable. Pending MRI/MRV. NSGY following.   - 7 day keppra course completed   - Q4 hr neurochecks   - HOB elevated 30 degrees or more    # Acute pain  -Scheduled Tylenol    #Agitation while intubated  - Significant improvement in agitation  - weaned off of prop, on low dose fentanyl      RESPIRATORY: #Acute hypoxic respiratory failure secondary to traumatic brain injury  - continue ps trials as tolerated  - daily CXR and ABG as needed      CARDIOVASC:   # H/o CAD, HTN, HLD - s/p coronary stent.   - Continue home atorvastatin 40 mg nightly  - holding home plavix and coumadin (home dosing Plavix 75 mg daily and coumadin 5 mg daily 5 days a week and 2.5 mg 2 days a week)  - no home medications for HTN     #Septic Shock 2/2 pneumonia  - wean levophed as tolerated   - BAL with bronchoscopy on 8/30     - Cefepime was started at that time due to PCN allergy     - Cultures growing E. Coli and group B strep.  Will continue with   - continue cefepime 8/31-9/3; increased 9/to 2g q8h for     GI: #History of GERD  - Discontinue famotidine 20 mg daily  - Patient has a Dobbhoff in place, receiving Pivot 1.5 at 45 cc/h.    - Bowel regimen with MiraLAX and senna S.    :   #History of CKD 3 in setting of renal cell carcinoma.    - Currently around baseline creatinine of ~ 1.12  - Evans in place for strict I's and O's.    - daily RFP    #History of BPH  - Holding home tamsulosin at this point, will restart when appropriate next    HEMATOLOGIC: # Acute blood loss anemia, stable  - Last received PRBCs on 8/27/2024.  Continue to hold home Plavix and warfarin at this time in setting of head bleed.   - continue to monitor CBC, transfuse as appropriate      ENDOCRINE: No active issues  - q 4hr glucose checks  - SSI, has not required any SSI in the past 24 hrs     MUSCULOSKELETAL/SKIN: #Skin tear underlying pannus on the right  - Local wound care  - Patient and is on a specialty bed  - Mepilex to bony  prominences.  Continue to help patient turn every 2 hours. ICU Mobility score: 3     INFECTIOUS DISEASE: # Resolved Leukocytosis  - Patient does have intermittent febrile episodes, likely secondary to central hyperthermia  - BAL with bronchoscopy 8/30    - Cultures growing out Escherichia coli and group B strep    - Continue cefepime (allergy to penicillins) (started 8/30), continue for total of 5-7 day course     - send UA today    GI PROPHYLAXIS: N/A  DVT PROPHYLAXIS: SCDs, lovenox 40mg     DISPOSITION: TSICU     Patient was seen and discussed with Dr. Morfin    Critical Care Time exceeds 30 minutes spent with patient reviewing VSS/medications, obtaining subjective information, performing physical exam, discussing plan of care;  with greater than 50% of that time spent in patient room for the following reasons: resp failure, hemodynamic instability      Sanford Graham PA-C  Trauma, Critical Care, and Acute Care Surgery  Ext. [phone]  ==============================================================================  CHIEF COMPLAINT / OVERNIGHT EVENTS / HPI:   No acute events overnight, pt this morning with intermittent spontaneous movement to right upper extremity. Not following commands.    On assessment later in the room, pt was following a couple of commands. Assessed again later with wife in the room, pt was not able to follow commands, flexion withdrawing in extremities. GCS 6T (E1V1M4)      MEDICAL HISTORY / ROS:  Admission history and ROS reviewed. Pertinent changes as follows: None.     PHYSICAL EXAM:  .Heart Rate:  []   Temp:  [35.7 °C (96.3 °F)-38.1 °C (100.6 °F)]   Resp:  [18-42]   BP: (122)/(56)   SpO2:  [91 %-100 %]       Physical Exam  Vitals and nursing note reviewed.   Constitutional:       General: He is not in acute distress.     Interventions: He is sedated and intubated.   HENT:      Head:      Comments: Staples in place reflecting s/p R craniotomy; subgaleal drain in place to bulb suction      Nose: Nose normal.      Mouth/Throat:      Comments: Endotracheal tube in place and secured  Eyes:      Conjunctiva/sclera: Conjunctivae normal.      Pupils: Pupils are equal, round, and reactive to light.   Cardiovascular:      Rate and Rhythm: Normal rate and regular rhythm.      Pulses: Normal pulses.   Pulmonary:      Effort: Pulmonary effort is normal. No respiratory distress. He is intubated.   Abdominal:      Palpations: Abdomen is soft.      Tube feeds running at 45mL/hr  Genitourinary:     Comments: Evans catheter in place.  Musculoskeletal:         General: Swelling present.      Comments: SCDs in place. Significant non-pitting edema to BLE.   Skin:     General: Skin is warm and dry.      Findings: Bruising and lesion present.      Comments: Scattered ecchymoses and bullae on BLE.   Neurological:      Comments: Spontaneously moves right upper extremity, no other movement appreciated. GCS 6T (E1V1M4)

## 2024-09-04 NOTE — CARE PLAN
Problem: Skin  Goal: Decreased wound size/increased tissue granulation at next dressing change  Outcome: Progressing  Flowsheets (Taken 9/4/2024 1858)  Decreased wound size/increased tissue granulation at next dressing change:   Promote sleep for wound healing   Protective dressings over bony prominences   Utilize specialty bed per algorithm  Goal: Participates in plan/prevention/treatment measures  Outcome: Progressing  Flowsheets (Taken 9/4/2024 1858)  Participates in plan/prevention/treatment measures:   Discuss with provider PT/OT consult   Elevate heels   Increase activity/out of bed for meals  Goal: Prevent/manage excess moisture  Outcome: Progressing  Flowsheets (Taken 9/4/2024 1858)  Prevent/manage excess moisture:   Cleanse incontinence/protect with barrier cream   Monitor for/manage infection if present   Follow provider orders for dressing changes   Moisturize dry skin  Goal: Prevent/minimize sheer/friction injuries  Outcome: Progressing  Flowsheets (Taken 9/4/2024 1858)  Prevent/minimize sheer/friction injuries:   Complete micro-shifts as needed if patient unable. Adjust patient position to relieve pressure points, not a full turn   Increase activity/out of bed for meals   Use pull sheet   HOB 30 degrees or less   Turn/reposition every 2 hours/use positioning/transfer devices   Utilize specialty bed per algorithm  Goal: Promote/optimize nutrition  Outcome: Progressing  Flowsheets (Taken 9/4/2024 1858)  Promote/optimize nutrition:   Assist with feeding   Monitor/record intake including meals   Offer water/supplements/favorite foods   Consume > 50% meals/supplements   Discuss with provider if NPO > 2 days   Reassess MST if dietician not consulted  Goal: Promote skin healing  Outcome: Progressing  Flowsheets (Taken 9/4/2024 1858)  Promote skin healing:   Assess skin/pad under line(s)/device(s)   Protective dressings over bony prominences   Turn/reposition every 2 hours/use positioning/transfer devices    Rotate device position/do not position patient on device   Ensure correct size (line/device) and apply per  instructions

## 2024-09-04 NOTE — CONSULTS
Vancomycin Dosing by Pharmacy- INITIAL    Travis Hunt is a 78 y.o. year old male who Pharmacy has been consulted for vancomycin dosing for CNS infection. Based on the patient's indication and renal status this patient is being dosed based on a goal AUC of 500-600.     Patient has CKD III. Renal function is currently Stable around baseline Scr 1.12    Estimated Creatinine Clearance: 80.4 mL/min (by C-G formula based on SCr of 1.15 mg/dL).     Results from last 7 days   Lab Units 09/04/24  0344 09/03/24  1821 09/03/24  1819 09/03/24  0024 08/31/24  0607 08/31/24  0331   BUN mg/dL 35* 35*  --  34*   < >  --    CREATININE mg/dL 1.15 1.12  --  1.08   < >  --    WBC AUTO x10*3/uL 14.0*  --  13.5* 10.0   < > 13.3*   VANCOMYCIN RM ug/mL  --   --   --   --   --  18.7    < > = values in this interval not displayed.        Blood Culture   Date/Time Value Ref Range Status   09/04/2024 01:13 AM Loaded on Instrument - Culture in progress  Preliminary   09/04/2024 01:13 AM Loaded on Instrument - Culture in progress  Preliminary     Gram Stain   Date/Time Value Ref Range Status   08/30/2024 07:32 PM (4+) Abundant Polymorphonuclear leukocytes (A)  Final   08/30/2024 07:32 PM (4+) Abundant Gram positive cocci (A)  Final   08/30/2024 07:32 PM (4+) Abundant Polymorphonuclear leukocytes (A)  Final   08/30/2024 07:32 PM (4+) Abundant Gram positive cocci (A)  Final        Susceptibility data from last 90 days.  Collected Specimen Info Organism Ampicillin Cefazolin Ciprofloxacin Gentamicin Levofloxacin Piperacillin/Tazobactam Trimethoprim/Sulfamethoxazole   08/30/24 Fluid from BAL Escherichia coli  S  S  S  S  S  S  S     Streptococcus agalactiae (Group B Streptococcus)          08/30/24 Fluid from BAL Escherichia coli  S  S  S  S  S  S  S     Streptococcus agalactiae (Group B Streptococcus)              Visit Vitals  /56   Pulse 100   Temp 36.5 °C (97.7 °F) (Temporal)   Resp 25        Assessment/Plan    Patient will be given a  loading dose of 2000 mg 9/4 @1330  Will initiate vancomycin maintenance,  1750 mg every 24 hours 9/5 @1330    This dosing regimen is predicted by [employer] to result in the following pharmacokinetic parameters:  Regimen: 1750 mg IV every 24 hours.  Start time: 12:02 on 09/04/2024  Exposure target: AUC24 (range)500-600 mg/L.hr   AUC24,ss: 543 mg/L.hr  Probability of AUC24 > 400: 87 %  Ctrough,ss: 14.9 mg/L  Probability of Ctrough,ss > 20: 24 %  Probability of nephrotoxicity (Lodise ARTIE 2009): 10 %    The next level will be obtained on 9/6 AM labs. May be obtained sooner if clinically indicated.   Will continue to monitor renal function daily while on vancomycin and order serum creatinine at least every 48 hours if not already ordered.  Follow for continued vancomycin needs, clinical response, and signs/symptoms of toxicity.       Sabine Kulkarni, PharmD

## 2024-09-04 NOTE — PROGRESS NOTES
"Travis Hunt is a 78 y.o. male on day 11 of admission presenting with Subdural hematoma (Multi).    Subjective   No acute events overnight       Objective     Physical Exam  Eyes open to noxious stim  Right upper and lower extremity follows command  Left upper and lower extremity withdraws to painful stimuli  Pupils 3 mm round equal and reactive  Incision well-approximated with staples in place and no signs of dehiscence or drainage or erythema  Last Recorded Vitals  Blood pressure 122/56, pulse 95, temperature 35.7 °C (96.3 °F), temperature source Temporal, resp. rate (!) 31, height 1.753 m (5' 9.02\"), weight (!) 155 kg (341 lb 14.9 oz), SpO2 95%.  Intake/Output last 3 Shifts:  I/O last 3 completed shifts:  In: 904 (5.8 mL/kg) [I.V.:254 (1.6 mL/kg); NG/GT:450; IV Piggyback:200]  Out: 3230 (20.8 mL/kg) [Urine:3230 (0.6 mL/kg/hr)]  Weight: 155.1 kg     Relevant Results               This patient has a central line   Reason for the central line remaining today? Line unnecessary, will be removed today    This patient has a urinary catheter   Reason for the urinary catheter remaining today? Urine catheter unnecessary, will be removed today    This patient is intubated   Reason for patient to remain intubated today? 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        Recommend maintaining systolic blood pressure less than 160     Recommend maintaining Plt > 100 and INR < 1.6     Neurosurgery team will continue to follow           Red Hein MD

## 2024-09-05 LAB
ABO GROUP (TYPE) IN BLOOD: NORMAL
ALBUMIN SERPL BCP-MCNC: 2.2 G/DL (ref 3.4–5)
ANION GAP BLDA CALCULATED.4IONS-SCNC: 14 MMO/L (ref 10–25)
ANION GAP SERPL CALC-SCNC: 15 MMOL/L (ref 10–20)
ANTIBODY SCREEN: NORMAL
APTT PPP: 27 SECONDS (ref 27–38)
BACTERIA UR CULT: NO GROWTH
BASE EXCESS BLDA CALC-SCNC: -2.8 MMOL/L (ref -2–3)
BODY TEMPERATURE: 37 DEGREES CELSIUS
BUN SERPL-MCNC: 39 MG/DL (ref 6–23)
CA-I BLDA-SCNC: 1.28 MMOL/L (ref 1.1–1.33)
CALCIUM SERPL-MCNC: 7.9 MG/DL (ref 8.6–10.6)
CFT FORM KAOLIN IND BLD RES TEG: 0.6 MIN (ref 0.8–2.1)
CFT FORM KAOLIN IND BLD RES TEG: 0.6 MIN (ref 0.8–2.1)
CHLORIDE BLDA-SCNC: 104 MMOL/L (ref 98–107)
CHLORIDE SERPL-SCNC: 105 MMOL/L (ref 98–107)
CLOT ANGLE.KAOLIN INDUCED BLD RES TEG: >83 DEG (ref 63–78)
CLOT ANGLE.KAOLIN INDUCED BLD RES TEG: >83 DEG (ref 63–78)
CLOT INIT KAO IND P HEP NEUT BLD RES TEG: 4.6 MIN (ref 4.3–8.3)
CLOT INIT KAO IND P HEP NEUT BLD RES TEG: 4.6 MIN (ref 4.3–8.3)
CLOT INIT KAO IND P HEP NEUT BLD RES TEG: 5 MIN (ref 4.6–9.1)
CLOT INIT KAO IND P HEP NEUT BLD RES TEG: 5 MIN (ref 4.6–9.1)
CO2 SERPL-SCNC: 22 MMOL/L (ref 21–32)
CREAT SERPL-MCNC: 1.53 MG/DL (ref 0.5–1.3)
DAT-COMPLEMENT: NORMAL
DAT-IGG: NORMAL
DAT-POLYSPECIFIC: NORMAL
EGFRCR SERPLBLD CKD-EPI 2021: 47 ML/MIN/1.73M*2
ERYTHROCYTE [DISTWIDTH] IN BLOOD BY AUTOMATED COUNT: 16.6 % (ref 11.5–14.5)
FIBRINOGEN BLD CALC-MCNC: ABNORMAL MG/DL
GLUCOSE BLD MANUAL STRIP-MCNC: 164 MG/DL (ref 74–99)
GLUCOSE BLD MANUAL STRIP-MCNC: 164 MG/DL (ref 74–99)
GLUCOSE BLD MANUAL STRIP-MCNC: 173 MG/DL (ref 74–99)
GLUCOSE BLD MANUAL STRIP-MCNC: 174 MG/DL (ref 74–99)
GLUCOSE BLD MANUAL STRIP-MCNC: 175 MG/DL (ref 74–99)
GLUCOSE BLD MANUAL STRIP-MCNC: 181 MG/DL (ref 74–99)
GLUCOSE BLDA-MCNC: 202 MG/DL (ref 74–99)
GLUCOSE SERPL-MCNC: 196 MG/DL (ref 74–99)
HCO3 BLDA-SCNC: 21.7 MMOL/L (ref 22–26)
HCT VFR BLD AUTO: 20.7 % (ref 41–52)
HCT VFR BLD EST: 23 % (ref 41–52)
HGB BLD-MCNC: 7.2 G/DL (ref 13.5–17.5)
HGB BLDA-MCNC: 7.8 G/DL (ref 13.5–17.5)
HOLD SPECIMEN: NORMAL
INHALED O2 CONCENTRATION: 40 %
INR PPP: 1.3 (ref 0.9–1.1)
LACTATE BLDA-SCNC: 1 MMOL/L (ref 0.4–2)
MA KAOLIN BLD RES TEG: 74 MM (ref 52–69)
MA KAOLIN BLD RES TEG: 74.3 MM (ref 52–69)
MA KAOLIN+TF BLD RES TEG: 74 MM (ref 52–70)
MA KAOLIN+TF BLD RES TEG: 74.2 MM (ref 52–70)
MA TF IND+IIB-IIIA INH BLD RES TEG: >52 MM (ref 15–32)
MA TF IND+IIB-IIIA INH BLD RES TEG: >52 MM (ref 15–32)
MAGNESIUM SERPL-MCNC: 1.86 MG/DL (ref 1.6–2.4)
MCH RBC QN AUTO: 31 PG (ref 26–34)
MCHC RBC AUTO-ENTMCNC: 34.8 G/DL (ref 32–36)
MCV RBC AUTO: 89 FL (ref 80–100)
NRBC BLD-RTO: 0 /100 WBCS (ref 0–0)
OSMOLALITY SERPL: 295 MOSM/KG (ref 280–300)
OXYHGB MFR BLDA: 96 % (ref 94–98)
PCO2 BLDA: 35 MM HG (ref 38–42)
PH BLDA: 7.4 PH (ref 7.38–7.42)
PHOSPHATE SERPL-MCNC: 2.9 MG/DL (ref 2.5–4.9)
PLATELET # BLD AUTO: 281 X10*3/UL (ref 150–450)
PO2 BLDA: 103 MM HG (ref 85–95)
POTASSIUM BLDA-SCNC: 4.1 MMOL/L (ref 3.5–5.3)
POTASSIUM SERPL-SCNC: 4.1 MMOL/L (ref 3.5–5.3)
PROTHROMBIN TIME: 14.1 SECONDS (ref 9.8–12.8)
RBC # BLD AUTO: 2.32 X10*6/UL (ref 4.5–5.9)
RH FACTOR (ANTIGEN D): NORMAL
SAO2 % BLDA: 99 % (ref 94–100)
SODIUM BLDA-SCNC: 136 MMOL/L (ref 136–145)
SODIUM SERPL-SCNC: 138 MMOL/L (ref 136–145)
VANCOMYCIN SERPL-MCNC: 13.6 UG/ML (ref 5–20)
WBC # BLD AUTO: 16.1 X10*3/UL (ref 4.4–11.3)

## 2024-09-05 PROCEDURE — 2500000005 HC RX 250 GENERAL PHARMACY W/O HCPCS: Performed by: STUDENT IN AN ORGANIZED HEALTH CARE EDUCATION/TRAINING PROGRAM

## 2024-09-05 PROCEDURE — 86860 RBC ANTIBODY ELUTION: CPT

## 2024-09-05 PROCEDURE — 2550000001 HC RX 255 CONTRASTS: Performed by: STUDENT IN AN ORGANIZED HEALTH CARE EDUCATION/TRAINING PROGRAM

## 2024-09-05 PROCEDURE — 86077 PHYS BLOOD BANK SERV XMATCH: CPT | Performed by: STUDENT IN AN ORGANIZED HEALTH CARE EDUCATION/TRAINING PROGRAM

## 2024-09-05 PROCEDURE — 84132 ASSAY OF SERUM POTASSIUM: CPT

## 2024-09-05 PROCEDURE — 2500000004 HC RX 250 GENERAL PHARMACY W/ HCPCS (ALT 636 FOR OP/ED): Performed by: NURSE PRACTITIONER

## 2024-09-05 PROCEDURE — 70553 MRI BRAIN STEM W/O & W/DYE: CPT | Performed by: RADIOLOGY

## 2024-09-05 PROCEDURE — 85610 PROTHROMBIN TIME: CPT

## 2024-09-05 PROCEDURE — 86922 COMPATIBILITY TEST ANTIGLOB: CPT

## 2024-09-05 PROCEDURE — 83735 ASSAY OF MAGNESIUM: CPT

## 2024-09-05 PROCEDURE — 85027 COMPLETE CBC AUTOMATED: CPT

## 2024-09-05 PROCEDURE — A9575 INJ GADOTERATE MEGLUMI 0.1ML: HCPCS | Performed by: STUDENT IN AN ORGANIZED HEALTH CARE EDUCATION/TRAINING PROGRAM

## 2024-09-05 PROCEDURE — 85384 FIBRINOGEN ACTIVITY: CPT

## 2024-09-05 PROCEDURE — 2500000005 HC RX 250 GENERAL PHARMACY W/O HCPCS: Performed by: PHYSICIAN ASSISTANT

## 2024-09-05 PROCEDURE — 80202 ASSAY OF VANCOMYCIN: CPT | Performed by: PHYSICIAN ASSISTANT

## 2024-09-05 PROCEDURE — 2020000001 HC ICU ROOM DAILY

## 2024-09-05 PROCEDURE — 82947 ASSAY GLUCOSE BLOOD QUANT: CPT

## 2024-09-05 PROCEDURE — 86880 COOMBS TEST DIRECT: CPT

## 2024-09-05 PROCEDURE — 2500000004 HC RX 250 GENERAL PHARMACY W/ HCPCS (ALT 636 FOR OP/ED): Performed by: STUDENT IN AN ORGANIZED HEALTH CARE EDUCATION/TRAINING PROGRAM

## 2024-09-05 PROCEDURE — 86870 RBC ANTIBODY IDENTIFICATION: CPT

## 2024-09-05 PROCEDURE — 37799 UNLISTED PX VASCULAR SURGERY: CPT

## 2024-09-05 PROCEDURE — 86078 PHYS BLOOD BANK SERV REACTJ: CPT | Performed by: STUDENT IN AN ORGANIZED HEALTH CARE EDUCATION/TRAINING PROGRAM

## 2024-09-05 PROCEDURE — 86902 BLOOD TYPE ANTIGEN DONOR EA: CPT

## 2024-09-05 PROCEDURE — 86885 COOMBS TEST INDIRECT QUAL: CPT

## 2024-09-05 PROCEDURE — 86901 BLOOD TYPING SEROLOGIC RH(D): CPT | Performed by: PHYSICIAN ASSISTANT

## 2024-09-05 PROCEDURE — 2500000004 HC RX 250 GENERAL PHARMACY W/ HCPCS (ALT 636 FOR OP/ED): Mod: JZ | Performed by: PHYSICIAN ASSISTANT

## 2024-09-05 PROCEDURE — 2500000002 HC RX 250 W HCPCS SELF ADMINISTERED DRUGS (ALT 637 FOR MEDICARE OP, ALT 636 FOR OP/ED)

## 2024-09-05 PROCEDURE — 94003 VENT MGMT INPAT SUBQ DAY: CPT

## 2024-09-05 PROCEDURE — 83930 ASSAY OF BLOOD OSMOLALITY: CPT | Performed by: STUDENT IN AN ORGANIZED HEALTH CARE EDUCATION/TRAINING PROGRAM

## 2024-09-05 PROCEDURE — 2500000001 HC RX 250 WO HCPCS SELF ADMINISTERED DRUGS (ALT 637 FOR MEDICARE OP): Performed by: NURSE PRACTITIONER

## 2024-09-05 PROCEDURE — 37799 UNLISTED PX VASCULAR SURGERY: CPT | Performed by: PHYSICIAN ASSISTANT

## 2024-09-05 PROCEDURE — 94799 UNLISTED PULMONARY SVC/PX: CPT

## 2024-09-05 RX ORDER — CEFEPIME 1 G/50ML
2 INJECTION, SOLUTION INTRAVENOUS EVERY 12 HOURS
Status: DISCONTINUED | OUTPATIENT
Start: 2024-09-05 | End: 2024-09-08

## 2024-09-05 RX ORDER — MAGNESIUM SULFATE HEPTAHYDRATE 40 MG/ML
2 INJECTION, SOLUTION INTRAVENOUS ONCE
Status: COMPLETED | OUTPATIENT
Start: 2024-09-05 | End: 2024-09-05

## 2024-09-05 RX ORDER — SODIUM CHLORIDE 9 MG/ML
50 INJECTION, SOLUTION INTRAVENOUS CONTINUOUS
Status: DISCONTINUED | OUTPATIENT
Start: 2024-09-05 | End: 2024-09-11

## 2024-09-05 RX ORDER — GADOTERATE MEGLUMINE 376.9 MG/ML
30 INJECTION INTRAVENOUS
Status: COMPLETED | OUTPATIENT
Start: 2024-09-05 | End: 2024-09-05

## 2024-09-05 ASSESSMENT — PAIN - FUNCTIONAL ASSESSMENT
PAIN_FUNCTIONAL_ASSESSMENT: CPOT (CRITICAL CARE PAIN OBSERVATION TOOL)

## 2024-09-05 NOTE — PROGRESS NOTES
PLAN:  ICU due to: SDH after fall on plavix/coumadin.   -remains intubated/sedated   - q4 neuro checks   - serial exams with symptom management  -TF  -NSGY rec MRI brain and MRV  -trach/PEG discussions   -skin, line, ICU tlc      READMISSION? No      PAYOR: [employer]      DISCHARGE NEED: LOC TBD by remaining hospital course   -appropriate for LTAC      BARRIER: medical      SOCIAL: Renu ([email or web address]) [phone]

## 2024-09-05 NOTE — CONSULTS
Wound Care Consult     Visit Date: 9/5/2024      Patient Name: Travis Hunt         MRN: [id number]           YOB: 1945     Reason for Consult: right groin wound, bilateral knee wounds          Wound Assessment:  Wound 08/24/24  Buttocks Right (Active)   Wound Image   08/25/24 1239   Margins Poorly defined 09/05/24 0400   Drainage Description Serosanguineous 09/05/24 0400   Drainage Amount Small 09/05/24 0400   Dressing Foam 09/05/24 0400   Dressing Changed Changed 09/04/24 2000   Dressing Status Clean;Dry;Occlusive 09/05/24 0400       Wound 08/24/24 Pretibial Proximal;Right (Active)   Wound Image   09/05/24 1315   Wound Length (cm) 2 cm 09/05/24 1315   Wound Width (cm) 2 cm 09/05/24 1315   Wound Surface Area (cm^2) 4 cm^2 09/05/24 1315   State of Healing Non-healing 09/05/24 1315   Margins Attached edges 09/04/24 1600   Drainage Description Serosanguineous 09/05/24 1315   Drainage Amount Scant 09/05/24 1315   Dressing Xeroform;Foam 09/05/24 0400   Dressing Changed New 09/05/24 1315   Dressing Status Clean;Dry;Occlusive 09/05/24 0400       Wound 08/24/24 Pretibial Left;Proximal (Active)   Wound Image   09/05/24 1317   Wound Length (cm) 6.5 cm 09/05/24 1317   Wound Width (cm) 6.5 cm 09/05/24 1317   Wound Surface Area (cm^2) 42.25 cm^2 09/05/24 1317   State of Healing Non-healing 09/05/24 1317   Margins Attached edges 09/05/24 1317   Drainage Description Brown;Red 09/05/24 1317   Drainage Amount Small 09/05/24 1317   Dressing Xeroform;Foam 09/05/24 0400   Dressing Changed New 09/05/24 1317   Dressing Status Clean;Dry;Occlusive 09/05/24 0400       Wound 08/24/24 Leg Left;Proximal;Upper;Ventral (Active)   Wound Image   08/24/24 2151   State of Healing Non-healing 09/05/24 0400   Margins Poorly defined 09/05/24 0400   Drainage Description Sanguineous 09/05/24 0400   Drainage Amount Small 09/05/24 0400   Dressing Open to air 09/05/24 0400   Dressing Changed Changed 09/04/24 0800   Dressing Status  Clean;Dry;Occlusive 09/04/24 1800       Wound 08/24/24 Leg Proximal;Right;Upper;Ventral (Active)   Wound Image   08/24/24 2153   State of Healing Non-healing 09/04/24 1600   Margins Poorly defined 09/04/24 1600   Drainage Description Sanguineous 09/05/24 0400   Drainage Amount Small 09/05/24 0400   Dressing Foam 09/05/24 0400   Dressing Changed Changed 09/04/24 0800   Dressing Status Clean;Dry;Occlusive 09/05/24 0400       Wound 08/24/24 Incision Head Right;Upper (Active)   Site Assessment Clean;Dry 09/05/24 0400   Shahana-Wound Assessment Clean;Dry;Intact 09/05/24 0400   State of Healing Closed wound edges 09/04/24 1600   Margins Attached edges 09/04/24 1600   Closure Staples;Open to air 09/05/24 0400   Sutures/Staple Line Approximated 09/05/24 0400   Drainage Description None 09/05/24 0400   Drainage Amount None 09/05/24 0400   Dressing Open to air 09/05/24 0400   Dressing Changed New 08/25/24 0101   Dressing Status Clean 09/03/24 0800   Adhesive Closure Strips Intact 09/03/24 0800   Sutures Removed Intact Yes 09/03/24 0800       Wound 08/25/24 Hand Dorsal;Left (Active)   Wound Image   08/25/24 1224   Wound Length (cm) 4 cm 08/25/24 1224   Wound Width (cm) 3.5 cm 08/25/24 1224   Wound Surface Area (cm^2) 14 cm^2 08/25/24 1224   Wound Depth (cm) 0.2 cm 08/25/24 1224   Wound Volume (cm^3) 2.8 cm^3 08/25/24 1224   State of Healing Non-healing 09/05/24 0400   Margins Poorly defined;Not attached 09/05/24 0400   Drainage Description Serosanguineous 09/05/24 0400   Drainage Amount Scant 09/05/24 0400   Dressing Foam 09/05/24 0400   Dressing Changed Changed 09/03/24 0800   Dressing Status Clean;Dry;Occlusive 09/05/24 0400       Wound Pressure Injury Ischium Left (Active)   Site Assessment Purple 09/05/24 0400   Shahana-Wound Assessment Pink;Red 09/05/24 0400   Pressure Injury Stage DTPI 09/03/24 0800   Wound Length (cm) 6 cm 08/25/24 1239   Wound Width (cm) 6 cm 08/25/24 1239   Wound Surface Area (cm^2) 36 cm^2 08/25/24 1239    State of Healing Non-healing 09/04/24 1600   Drainage Description None 09/05/24 0400   Drainage Amount None 09/05/24 0400   Dressing Foam 09/05/24 0400   Dressing Changed Changed 09/04/24 0800   Dressing Status Clean;Dry;Occlusive 09/05/24 0400       Wound Pressure Injury Ischium Right (Active)   Site Assessment Pink;Purple 09/05/24 0400   Shahana-Wound Assessment Pink;Red 09/05/24 0400   Pressure Injury Stage DTPI 09/02/24 0800   Wound Length (cm) 6 cm 08/25/24 1239   Wound Width (cm) 5 cm 08/25/24 1239   Wound Surface Area (cm^2) 30 cm^2 08/25/24 1239   State of Healing Non-healing 09/04/24 1600   Drainage Description None 09/05/24 0400   Drainage Amount None 09/05/24 0400   Dressing Foam 09/05/24 0400   Dressing Changed Reinforced 09/02/24 0800   Dressing Status Clean;Dry 09/02/24 1600       Wound 08/28/24 Moisture Associated Skin Damage Groin Right (Active)   Wound Image   09/05/24 1318   Wound Length (cm) 16 cm 09/05/24 1318   Wound Width (cm) 2 cm 09/05/24 1318   Wound Surface Area (cm^2) 32 cm^2 09/05/24 1318   State of Healing Slough 09/04/24 1600   Margins Poorly defined 09/04/24 1600   Drainage Description Serosanguineous 09/05/24 1318   Drainage Amount Scant 09/05/24 1318   Dressing Other (Comment) 09/05/24 0400   Dressing Changed New 09/05/24 1318   Dressing Status Clean;Dry;Occlusive 09/05/24 0400       Wound 08/28/24 Abdomen Lower;Right (Active)   Wound Image   08/28/24 2148   State of Healing Non-healing 09/04/24 1600   Margins Poorly defined 09/04/24 1600   Drainage Description Red;Tan 09/05/24 0400   Drainage Amount Small 09/05/24 0400   Dressing Dry dressing 09/04/24 1800   Dressing Changed Changed 09/02/24 0800   Dressing Status Clean;Dry 09/02/24 1600       Wound Team Summary Assessment:   For right groin: cleanse daily with vashe wash or NS. Apply Aquacel AG to open area and cover with Mepilex Transfer. Continue to line pannus and other Intertriginous folds with Interdry AG (change daily with  care).  DISCONTINUE USE OF NYSTATIN POWDER to this area. Patient does not have fungal rash and powder should not be used on open tissue.     For right knee: cleanse with vashe wash, dry with gauze. Apply Medihoney sheet and mepilex border dressing. This dressing should be changed every 3 days (next on Sunday), and can be done by the bedside RN.  For left knee: cleanse with wash wash, dry with gauze. Patellar wound with soft, fluctuant hematoma (old dark blood oozes when palpated). Periwound with open but dry area measuring 3cm x 3cm; medihoney sheet and mepilex border dressing applied.  Hematoma covered with mepitel, Aquacel AG, and mepilex border dressing. This dressing can also be changed every 3 days, next on Sunday (by bedside RN).   All dressings left at the bedside. Discussed care with patient's wife and RN.     Genevieve Ledbetter RN  9/5/2024  1:26 PM

## 2024-09-05 NOTE — PROGRESS NOTES
[facility]  TRAUMA ICU - PROGRESS NOTE     Patient Name: Travis Hunt  MRN: [id number]  Admit Date: 824  : 1945                      AGE: 78 y.o.                             GENDER: male  ==============================================================================     MECHANISM OF INJURY:   79 yo male came to [facility] as a transfer from [facility] after a fall backwards from a chair.     LOC (yes/no?): unknown  Anticoagulant / Anti-platelet Rx? (for what dx?): ASA abd Plavix  Referring Facility Name (N/A for scene EMR run): [facility]     INJURIES:   R subdural hematoma with 1.5 cm Right to left shift     OTHER MEDICAL PROBLEMS:  Prior fracture deformity of L transverse process of L5.  R posterior rib fracture of 12th rib unchanged since scan on 22.     INCIDENTAL FINDINGS:  Densities around left kidney  Ventral hernia containing bowel segment.   Solid lung nodule in Right lower lobe increased in size from previous exam on 21 and now 0.7 X 0.6 cm.     PROCEDURES:  : Right craniotomy for SDH evacuation    Heart Rate:  []   Temp:  [36.5 °C (97.7 °F)-37.1 °C (98.8 °F)]   Resp:  [14-36]   BP: ()/(27-80)   SpO2:  [91 %-100 %]    ==============================================================================  TODAY'S ASSESSMENT AND PLAN OF CARE:  Travis Hunt is a 78 y.o. male in the ICU due to: SDH after fall on plavix/coumadin.     NEURO/PAIN/SEDATION:   # Severe traumatic brain injury and SDH s/p decompressive crainiotomy on 8/24  - CT head on 2024 was stable. Pending MRI/MRV. NSGY following.   - 7 day keppra course completed   - Q4 hr neurochecks   - HOB elevated 30 degrees or more    # Acute pain  -Scheduled Tylenol    #Agitation while intubated  - weaned off of prop, on low dose fentanyl      RESPIRATORY: #Acute hypoxic respiratory failure secondary to traumatic brain injury  - continue ps trials as tolerated  - daily CXR and ABG as needed       CARDIOVASC:   # H/o CAD, HTN, HLD - s/p coronary stent.   - Continue home atorvastatin 40 mg nightly  - holding home plavix and coumadin (home dosing Plavix 75 mg daily and coumadin 5 mg daily 5 days a week and 2.5 mg 2 days a week)  - no home medications for HTN     #Septic Shock 2/2 pneumonia  - wean levophed as tolerated   - BAL with bronchoscopy on 8/30     - Cefepime was started at that time due to PCN allergy     - Cultures growing E. Coli and group B strep.  - continue cefepime 8/31-9/3; increased 9/to 2g q8h for CNS coverage    GI: #History of GERD  - Discontinue famotidine 20 mg daily  - Patient has a Dobbhoff in place, receiving Pivot 1.5 at 45 cc/h.    - Bowel regimen with MiraLAX and senna S.    :   #History of CKD 3 in setting of renal cell carcinoma.    - Currently around baseline creatinine of ~ 1.12  - Evans in place for strict I's and O's.    - daily RFP    #History of BPH  - Holding home tamsulosin at this point, will restart when appropriate next    HEMATOLOGIC: # Acute blood loss anemia, stable  - Last received PRBCs on 8/27/2024.  Continue to hold home Plavix and warfarin at this time in setting of head bleed.   - continue to monitor CBC, transfuse as appropriate      ENDOCRINE: No active issues  - q 4hr glucose checks  - SSI, has not required any SSI in the past 24 hrs     MUSCULOSKELETAL/SKIN: #Skin tear underlying pannus on the right  - Local wound care  - Patient and is on a specialty bed  - Mepilex to bony prominences.  Continue to help patient turn every 2 hours. ICU Mobility score: 3     INFECTIOUS DISEASE: # Resolved Leukocytosis  - Patient does have intermittent febrile episodes, likely secondary to central hyperthermia  - BAL with bronchoscopy 8/30    - Cultures growing out Escherichia coli and group B strep    - Continue cefepime (allergy to penicillins) (started 8/30), continue for total of 5-7 day course     - send UA today    GI PROPHYLAXIS: N/A  DVT PROPHYLAXIS: SCDs, lovenox  40mg     DISPOSITION: TSICU     Patient was seen and discussed with Dr. Morfin    Critical Care Time exceeds 30 minutes spent with patient reviewing VSS/medications, obtaining subjective information, performing physical exam, discussing plan of care;  with greater than 50% of that time spent in patient room for the following reasons: resp failure, hemodynamic instability      Sanford Graham PA-C  Trauma, Critical Care, and Acute Care Surgery  Ext. [phone]  ==============================================================================  CHIEF COMPLAINT / OVERNIGHT EVENTS / HPI:   No acute events overnight, pt this morning with intermittent spontaneous movement to right upper extremity. Not following commands.    On assessment later in the room, pt was following a couple of commands. Assessed again later with wife in the room, pt was not able to follow commands, flexion withdrawing in extremities. GCS 6T (E1V1M4)      MEDICAL HISTORY / ROS:  Admission history and ROS reviewed. Pertinent changes as follows: None.     PHYSICAL EXAM:  .Heart Rate:  []   Temp:  [36.5 °C (97.7 °F)-37.1 °C (98.8 °F)]   Resp:  [14-36]   BP: ()/(27-80)   SpO2:  [91 %-100 %]       Physical Exam  Vitals and nursing note reviewed.   Constitutional:       General: He is not in acute distress.     Interventions: He is sedated and intubated.   HENT:      Head:      Comments: Staples in place reflecting s/p R craniotomy; subgaleal drain in place to bulb suction     Nose: Nose normal.      Mouth/Throat:      Comments: Endotracheal tube in place and secured  Eyes:      Conjunctiva/sclera: Conjunctivae normal.      Pupils: Pupils are equal, round, and reactive to light.   Cardiovascular:      Rate and Rhythm: Normal rate and regular rhythm.      Pulses: Normal pulses.   Pulmonary:      Effort: Pulmonary effort is normal. No respiratory distress. He is intubated.   Abdominal:      Palpations: Abdomen is soft.      Tube feeds running at  45mL/hr  Genitourinary:     Comments: Evans catheter in place.  Musculoskeletal:         General: Swelling present.      Comments: SCDs in place. Significant non-pitting edema to BLE.   Skin:     General: Skin is warm and dry.      Findings: Bruising and lesion present.      Comments: Scattered ecchymoses and bullae on BLE.   Neurological:      Comments: Spontaneously moves right upper extremity, no other movement appreciated. GCS 6T (E1V1M4)

## 2024-09-05 NOTE — PROGRESS NOTES
[facility]  TRAUMA SERVICE - PROGRESS NOTE    Patient Name: Travis Hunt  MRN: [id number]  Admit Date: 824  : 1945  AGE: 78 y.o.   GENDER: male  ==============================================================================  MECHANISM OF INJURY:   79 yo male came to [facility] as a transfer from [facility] after a fall backwards from a chair landing on the back of his head. + Headstrike. Unknown LOC. CTH with large SDH with midline shift and was transferred to [facility] for neurosurgical intervention and ICU admission. 3% NS at OSH. On arrival to [facility] campus, GCS 3. S/p emergent OR for decompressive craniotomy.     LOC (yes/no?): unknown  Anticoagulant / Anti-platelet Rx? (for what dx?): coumadin/ASA, plavix  Referring Facility Name (N/A for scene EMR run): [facility]    INJURIES:   R SDH with 1.5 cm midline shift    OTHER MEDICAL PROBLEMS:  HTN  HLD  CAD  PE/DVTs (on coumadin)  Renal cell carcinoma  VAP    INCIDENTAL FINDINGS:  Prior fracture deformity of L transverse process of L5.  R posterior rib fracture of 12th rib unchanged since scan on 22.  Densities around left kidney  Ventral hernia containing bowel segment.   Solid lung nodule in Right lower lobe increased in size from previous exam on 21 and now 0.7 X 0.6 cm    PROCEDURES:  : Right craniotomy for hematoma evacuation  : Bronchoscopy    ==============================================================================  TODAY'S ASSESSMENT AND PLAN OF CARE:  79 yo M s/p fall with R SDH on coumadin/ASA/plavix s/p decompressive craniotomy. Repeat CTH with continued blossoming of intraparenchymal hematoma but stable subdural and subarachnoid collections. Last CT head 24 - stable right frontal hematoma with surrounding edema, stable SDH. Now febrile, requiring levophed. Bronchoscopy  with BAL.    - Continue tube feeds at goal  - BAL cultures growing E. Coli and group B strep  - Continue  cefepime  - Neurosurgery recommending MRI and MRV  - Will discuss trach/PEG with family after GOC discussion, neurosurgery prognostication  - DVT ppx: lovenox 30 mg BID    Seen and discussed with attending surgeon, Dr. Kent.    Elis Rodney MD  PGY4  General Surgery   Trauma Surgery    ==============================================================================  CHIEF COMPLAINT / OVERNIGHT EVENTS:   No acute overnight events. Remains on Norepinephrine 0.06.    MEDICAL HISTORY / ROS:  Admission history and ROS reviewed. Pertinent changes as follows:  None    PHYSICAL EXAM:  Heart Rate:  []   Temp:  [36.5 °C (97.7 °F)-37.1 °C (98.8 °F)]   Resp:  [14-36]   BP: ()/(27-80)   SpO2:  [91 %-100 %]   Physical Exam  Constitutional:       Comments: Sedated   Eyes:      Pupils: Pupils are equal, round, and reactive to light.   Cardiovascular:      Rate and Rhythm: Normal rate and regular rhythm.   Pulmonary:      Effort: No respiratory distress.      Comments: Intubated  Abdominal:      General: There is no distension.      Palpations: Abdomen is soft.      Tenderness: There is no abdominal tenderness.   Skin:     General: Skin is warm and dry.   Neurological:      Comments: Spontaneously moves on right        IMAGING SUMMARY:    No new imaging.    LABS:  Results from last 7 days   Lab Units 09/05/24  0158 09/04/24  0344 09/03/24  1819   WBC AUTO x10*3/uL 16.1* 14.0* 13.5*   HEMOGLOBIN g/dL 7.2* 7.7* 7.7*   HEMATOCRIT % 20.7* 22.5* 23.8*   PLATELETS AUTO x10*3/uL 281 260 261     Results from last 7 days   Lab Units 09/05/24  0158 09/03/24  1821 09/01/24  2357   APTT seconds 27 27 29   INR  1.3* 1.2* 1.2*     Results from last 7 days   Lab Units 09/05/24  0158 09/04/24  0344 09/03/24  1821   SODIUM mmol/L 138 141 142   POTASSIUM mmol/L 4.1 4.1 4.0   CHLORIDE mmol/L 105 107 108*   CO2 mmol/L 22 26 26   BUN mg/dL 39* 35* 35*   CREATININE mg/dL 1.53* 1.15 1.12   CALCIUM mg/dL 7.9* 9.5 8.4*   GLUCOSE mg/dL  196* 148* 171*           Results from last 7 days   Lab Units 09/05/24  0157 09/04/24  1514 09/04/24  0343   POCT PH, ARTERIAL pH 7.40 7.46* 7.44*   POCT PCO2, ARTERIAL mm Hg 35* 32* 34*   POCT PO2, ARTERIAL mm Hg 103* 109* 96*   POCT HCO3 CALCULATED, ARTERIAL mmol/L 21.7* 22.8 23.1   POCT BASE EXCESS, ARTERIAL mmol/L -2.8* -0.7 -0.5       I have reviewed all medications, laboratory results, and imaging pertinent for today's encounter.

## 2024-09-05 NOTE — PROGRESS NOTES
"Travis Hunt is a 78 y.o. male on day 12 of admission presenting with Subdural hematoma (Multi).    Subjective   No acute events overnight       Objective     Physical Exam  Eyes closed to noxious stim  RUE purposeful  LUE withdrawing  BLE withdrawing  Incision well-approximated with staples in place and no signs of dehiscence or drainage or erythema    Last Recorded Vitals  Blood pressure (!) 81/34, pulse 79, temperature 36.6 °C (97.9 °F), temperature source Temporal, resp. rate 14, height 1.753 m (5' 9.02\"), weight (!) 155 kg (341 lb 14.9 oz), SpO2 97%.  Intake/Output last 3 Shifts:  I/O last 3 completed shifts:  In: 1664.4 (10.7 mL/kg) [I.V.:864.4 (5.6 mL/kg); IV Piggyback:800]  Out: 1795 (11.6 mL/kg) [Urine:1795 (0.3 mL/kg/hr)]  Weight: 155.1 kg     Relevant Results               This patient has a central line   Reason for the central line remaining today? Line unnecessary, will be removed today    This patient has a urinary catheter   Reason for the urinary catheter remaining today? Urine catheter unnecessary, will be removed today    This patient is intubated   Reason for patient to remain intubated today? 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        Recommend maintaining systolic blood pressure less than 160     Recommend maintaining Plt > 100 and INR < 1.6     Neurosurgery team will continue to follow           Kimberly Chen MD

## 2024-09-06 LAB
ABO GROUP (TYPE) IN BLOOD: NORMAL
ALBUMIN SERPL BCP-MCNC: 2.2 G/DL (ref 3.4–5)
ALBUMIN SERPL BCP-MCNC: 2.3 G/DL (ref 3.4–5)
ALP SERPL-CCNC: 72 U/L (ref 33–136)
ALT SERPL W P-5'-P-CCNC: 30 U/L (ref 10–52)
ANION GAP BLDA CALCULATED.4IONS-SCNC: 10 MMO/L (ref 10–25)
ANION GAP SERPL CALC-SCNC: 11 MMOL/L (ref 10–20)
ANION GAP SERPL CALC-SCNC: 12 MMOL/L (ref 10–20)
ANTIBODY SCREEN: NORMAL
AST SERPL W P-5'-P-CCNC: 45 U/L (ref 9–39)
BASE EXCESS BLDA CALC-SCNC: -1.6 MMOL/L (ref -2–3)
BASOPHILS # BLD AUTO: 0.03 X10*3/UL (ref 0–0.1)
BASOPHILS # BLD AUTO: 0.04 X10*3/UL (ref 0–0.1)
BASOPHILS # BLD AUTO: 0.06 X10*3/UL (ref 0–0.1)
BASOPHILS NFR BLD AUTO: 0.3 %
BASOPHILS NFR BLD AUTO: 0.4 %
BASOPHILS NFR BLD AUTO: 0.5 %
BB ANTIBODY IDENTIFICATION: NORMAL
BILIRUB DIRECT SERPL-MCNC: 0.4 MG/DL (ref 0–0.3)
BILIRUB SERPL-MCNC: 1.1 MG/DL (ref 0–1.2)
BLOOD EXPIRATION DATE: NORMAL
BLOOD EXPIRATION DATE: NORMAL
BODY TEMPERATURE: 37 DEGREES CELSIUS
BUN SERPL-MCNC: 49 MG/DL (ref 6–23)
BUN SERPL-MCNC: 55 MG/DL (ref 6–23)
CA-I BLDA-SCNC: 1.25 MMOL/L (ref 1.1–1.33)
CALCIUM SERPL-MCNC: 7.9 MG/DL (ref 8.6–10.6)
CALCIUM SERPL-MCNC: 8.1 MG/DL (ref 8.6–10.6)
CASE #: NORMAL
CHLORIDE BLDA-SCNC: 105 MMOL/L (ref 98–107)
CHLORIDE SERPL-SCNC: 105 MMOL/L (ref 98–107)
CHLORIDE SERPL-SCNC: 105 MMOL/L (ref 98–107)
CO2 SERPL-SCNC: 22 MMOL/L (ref 21–32)
CO2 SERPL-SCNC: 23 MMOL/L (ref 21–32)
CREAT SERPL-MCNC: 1.71 MG/DL (ref 0.5–1.3)
CREAT SERPL-MCNC: 1.81 MG/DL (ref 0.5–1.3)
DAT-POLYSPECIFIC: NORMAL
DISPENSE STATUS: NORMAL
DISPENSE STATUS: NORMAL
EGFRCR SERPLBLD CKD-EPI 2021: 38 ML/MIN/1.73M*2
EGFRCR SERPLBLD CKD-EPI 2021: 41 ML/MIN/1.73M*2
EOSINOPHIL # BLD AUTO: 0.38 X10*3/UL (ref 0–0.4)
EOSINOPHIL # BLD AUTO: 0.4 X10*3/UL (ref 0–0.4)
EOSINOPHIL # BLD AUTO: 0.42 X10*3/UL (ref 0–0.4)
EOSINOPHIL NFR BLD AUTO: 3.5 %
EOSINOPHIL NFR BLD AUTO: 3.6 %
EOSINOPHIL NFR BLD AUTO: 3.8 %
ERYTHROCYTE [DISTWIDTH] IN BLOOD BY AUTOMATED COUNT: 16.5 % (ref 11.5–14.5)
ERYTHROCYTE [DISTWIDTH] IN BLOOD BY AUTOMATED COUNT: 17 % (ref 11.5–14.5)
ERYTHROCYTE [DISTWIDTH] IN BLOOD BY AUTOMATED COUNT: 17 % (ref 11.5–14.5)
GLUCOSE BLD MANUAL STRIP-MCNC: 165 MG/DL (ref 74–99)
GLUCOSE BLD MANUAL STRIP-MCNC: 167 MG/DL (ref 74–99)
GLUCOSE BLD MANUAL STRIP-MCNC: 167 MG/DL (ref 74–99)
GLUCOSE BLD MANUAL STRIP-MCNC: 197 MG/DL (ref 74–99)
GLUCOSE BLD MANUAL STRIP-MCNC: 206 MG/DL (ref 74–99)
GLUCOSE BLD MANUAL STRIP-MCNC: 228 MG/DL (ref 74–99)
GLUCOSE BLDA-MCNC: 224 MG/DL (ref 74–99)
GLUCOSE SERPL-MCNC: 198 MG/DL (ref 74–99)
GLUCOSE SERPL-MCNC: 220 MG/DL (ref 74–99)
HAPTOGLOB SERPL NEPH-MCNC: 388 MG/DL (ref 30–200)
HCO3 BLDA-SCNC: 22.7 MMOL/L (ref 22–26)
HCT VFR BLD AUTO: 18.5 % (ref 41–52)
HCT VFR BLD AUTO: 19.3 % (ref 41–52)
HCT VFR BLD AUTO: 21.1 % (ref 41–52)
HCT VFR BLD EST: 22 % (ref 41–52)
HGB BLD-MCNC: 6.2 G/DL (ref 13.5–17.5)
HGB BLD-MCNC: 6.6 G/DL (ref 13.5–17.5)
HGB BLD-MCNC: 7 G/DL (ref 13.5–17.5)
HGB BLDA-MCNC: 7.4 G/DL (ref 13.5–17.5)
IMM GRANULOCYTES # BLD AUTO: 0.54 X10*3/UL (ref 0–0.5)
IMM GRANULOCYTES # BLD AUTO: 0.59 X10*3/UL (ref 0–0.5)
IMM GRANULOCYTES # BLD AUTO: 0.62 X10*3/UL (ref 0–0.5)
IMM GRANULOCYTES NFR BLD AUTO: 4.8 % (ref 0–0.9)
IMM GRANULOCYTES NFR BLD AUTO: 5.4 % (ref 0–0.9)
IMM GRANULOCYTES NFR BLD AUTO: 5.6 % (ref 0–0.9)
INHALED O2 CONCENTRATION: 40 %
LACTATE BLDA-SCNC: 0.8 MMOL/L (ref 0.4–2)
LDH SERPL L TO P-CCNC: 389 U/L (ref 84–246)
LYMPHOCYTES # BLD AUTO: 0.91 X10*3/UL (ref 0.8–3)
LYMPHOCYTES # BLD AUTO: 1.06 X10*3/UL (ref 0.8–3)
LYMPHOCYTES # BLD AUTO: 1.11 X10*3/UL (ref 0.8–3)
LYMPHOCYTES NFR BLD AUTO: 10.1 %
LYMPHOCYTES NFR BLD AUTO: 8 %
LYMPHOCYTES NFR BLD AUTO: 9.9 %
MAGNESIUM SERPL-MCNC: 2.27 MG/DL (ref 1.6–2.4)
MCH RBC QN AUTO: 29 PG (ref 26–34)
MCH RBC QN AUTO: 30.4 PG (ref 26–34)
MCH RBC QN AUTO: 31 PG (ref 26–34)
MCHC RBC AUTO-ENTMCNC: 33.2 G/DL (ref 32–36)
MCHC RBC AUTO-ENTMCNC: 33.5 G/DL (ref 32–36)
MCHC RBC AUTO-ENTMCNC: 34.2 G/DL (ref 32–36)
MCV RBC AUTO: 88 FL (ref 80–100)
MCV RBC AUTO: 89 FL (ref 80–100)
MCV RBC AUTO: 93 FL (ref 80–100)
MONOCYTES # BLD AUTO: 0.88 X10*3/UL (ref 0.05–0.8)
MONOCYTES # BLD AUTO: 0.88 X10*3/UL (ref 0.05–0.8)
MONOCYTES # BLD AUTO: 0.93 X10*3/UL (ref 0.05–0.8)
MONOCYTES NFR BLD AUTO: 7.7 %
MONOCYTES NFR BLD AUTO: 8.3 %
MONOCYTES NFR BLD AUTO: 8.4 %
NEUTROPHILS # BLD AUTO: 7.58 X10*3/UL (ref 1.6–5.5)
NEUTROPHILS # BLD AUTO: 8.12 X10*3/UL (ref 1.6–5.5)
NEUTROPHILS # BLD AUTO: 8.57 X10*3/UL (ref 1.6–5.5)
NEUTROPHILS NFR BLD AUTO: 72 %
NEUTROPHILS NFR BLD AUTO: 72.7 %
NEUTROPHILS NFR BLD AUTO: 75 %
NRBC BLD-RTO: 0 /100 WBCS (ref 0–0)
OXYHGB MFR BLDA: 96 % (ref 94–98)
PATH REV-IMMUNOHEMATOLOGY-PR30: NORMAL
PCO2 BLDA: 35 MM HG (ref 38–42)
PH BLDA: 7.42 PH (ref 7.38–7.42)
PHOSPHATE SERPL-MCNC: 2.7 MG/DL (ref 2.5–4.9)
PLATELET # BLD AUTO: 215 X10*3/UL (ref 150–450)
PLATELET # BLD AUTO: 236 X10*3/UL (ref 150–450)
PLATELET # BLD AUTO: 251 X10*3/UL (ref 150–450)
PLATELET CLUMP BLD QL SMEAR: PRESENT
PO2 BLDA: 100 MM HG (ref 85–95)
POLYCHROMASIA BLD QL SMEAR: NORMAL
POTASSIUM BLDA-SCNC: 4 MMOL/L (ref 3.5–5.3)
POTASSIUM SERPL-SCNC: 3.9 MMOL/L (ref 3.5–5.3)
POTASSIUM SERPL-SCNC: 4 MMOL/L (ref 3.5–5.3)
PRODUCT BLOOD TYPE: 9500
PRODUCT BLOOD TYPE: 9500
PRODUCT CODE: NORMAL
PRODUCT CODE: NORMAL
PROT SERPL-MCNC: 5 G/DL (ref 6.4–8.2)
RBC # BLD AUTO: 2 X10*6/UL (ref 4.5–5.9)
RBC # BLD AUTO: 2.17 X10*6/UL (ref 4.5–5.9)
RBC # BLD AUTO: 2.41 X10*6/UL (ref 4.5–5.9)
RBC MORPH BLD: NORMAL
RBC MORPH BLD: NORMAL
RH FACTOR (ANTIGEN D): NORMAL
SAO2 % BLDA: 99 % (ref 94–100)
SODIUM BLDA-SCNC: 134 MMOL/L (ref 136–145)
SODIUM SERPL-SCNC: 135 MMOL/L (ref 136–145)
SODIUM SERPL-SCNC: 135 MMOL/L (ref 136–145)
UNIT ABO: NORMAL
UNIT ABO: NORMAL
UNIT NUMBER: NORMAL
UNIT NUMBER: NORMAL
UNIT RH: NORMAL
UNIT RH: NORMAL
UNIT VOLUME: 340
UNIT VOLUME: 350
VANCOMYCIN SERPL-MCNC: 10 UG/ML (ref 5–20)
WBC # BLD AUTO: 10.5 X10*3/UL (ref 4.4–11.3)
WBC # BLD AUTO: 11.2 X10*3/UL (ref 4.4–11.3)
WBC # BLD AUTO: 11.4 X10*3/UL (ref 4.4–11.3)
XM INTEP: NORMAL
XM INTEP: NORMAL

## 2024-09-06 PROCEDURE — 2500000004 HC RX 250 GENERAL PHARMACY W/ HCPCS (ALT 636 FOR OP/ED): Performed by: NURSE PRACTITIONER

## 2024-09-06 PROCEDURE — 94003 VENT MGMT INPAT SUBQ DAY: CPT

## 2024-09-06 PROCEDURE — 99223 1ST HOSP IP/OBS HIGH 75: CPT | Performed by: STUDENT IN AN ORGANIZED HEALTH CARE EDUCATION/TRAINING PROGRAM

## 2024-09-06 PROCEDURE — 97112 NEUROMUSCULAR REEDUCATION: CPT | Mod: GO

## 2024-09-06 PROCEDURE — 2500000004 HC RX 250 GENERAL PHARMACY W/ HCPCS (ALT 636 FOR OP/ED): Performed by: PHYSICIAN ASSISTANT

## 2024-09-06 PROCEDURE — 84132 ASSAY OF SERUM POTASSIUM: CPT

## 2024-09-06 PROCEDURE — 37799 UNLISTED PX VASCULAR SURGERY: CPT

## 2024-09-06 PROCEDURE — 36430 TRANSFUSION BLD/BLD COMPNT: CPT

## 2024-09-06 PROCEDURE — 97530 THERAPEUTIC ACTIVITIES: CPT | Mod: GO

## 2024-09-06 PROCEDURE — 82248 BILIRUBIN DIRECT: CPT | Performed by: NURSE PRACTITIONER

## 2024-09-06 PROCEDURE — 85025 COMPLETE CBC W/AUTO DIFF WBC: CPT

## 2024-09-06 PROCEDURE — 2500000005 HC RX 250 GENERAL PHARMACY W/O HCPCS: Performed by: STUDENT IN AN ORGANIZED HEALTH CARE EDUCATION/TRAINING PROGRAM

## 2024-09-06 PROCEDURE — 83735 ASSAY OF MAGNESIUM: CPT

## 2024-09-06 PROCEDURE — 2500000002 HC RX 250 W HCPCS SELF ADMINISTERED DRUGS (ALT 637 FOR MEDICARE OP, ALT 636 FOR OP/ED)

## 2024-09-06 PROCEDURE — 99291 CRITICAL CARE FIRST HOUR: CPT | Performed by: NURSE PRACTITIONER

## 2024-09-06 PROCEDURE — 82947 ASSAY GLUCOSE BLOOD QUANT: CPT

## 2024-09-06 PROCEDURE — 2500000005 HC RX 250 GENERAL PHARMACY W/O HCPCS: Performed by: PHYSICIAN ASSISTANT

## 2024-09-06 PROCEDURE — 86870 RBC ANTIBODY IDENTIFICATION: CPT

## 2024-09-06 PROCEDURE — P9016 RBC LEUKOCYTES REDUCED: HCPCS

## 2024-09-06 PROCEDURE — 86880 COOMBS TEST DIRECT: CPT

## 2024-09-06 PROCEDURE — 97110 THERAPEUTIC EXERCISES: CPT | Mod: GO

## 2024-09-06 PROCEDURE — 86901 BLOOD TYPING SEROLOGIC RH(D): CPT | Performed by: PHYSICIAN ASSISTANT

## 2024-09-06 PROCEDURE — 80202 ASSAY OF VANCOMYCIN: CPT

## 2024-09-06 PROCEDURE — 94003 VENT MGMT INPAT SUBQ DAY: CPT | Performed by: NURSE PRACTITIONER

## 2024-09-06 PROCEDURE — 2500000001 HC RX 250 WO HCPCS SELF ADMINISTERED DRUGS (ALT 637 FOR MEDICARE OP): Performed by: NURSE PRACTITIONER

## 2024-09-06 PROCEDURE — 83615 LACTATE (LD) (LDH) ENZYME: CPT | Performed by: NURSE PRACTITIONER

## 2024-09-06 PROCEDURE — 2020000001 HC ICU ROOM DAILY

## 2024-09-06 RX ORDER — ENOXAPARIN SODIUM 100 MG/ML
40 INJECTION SUBCUTANEOUS EVERY 12 HOURS
Status: DISCONTINUED | OUTPATIENT
Start: 2024-09-06 | End: 2024-09-10

## 2024-09-06 RX ORDER — DEXTROSE 50 % IN WATER (D50W) INTRAVENOUS SYRINGE
25
Status: DISCONTINUED | OUTPATIENT
Start: 2024-09-06 | End: 2024-09-11

## 2024-09-06 RX ORDER — INSULIN LISPRO 100 [IU]/ML
0-10 INJECTION, SOLUTION INTRAVENOUS; SUBCUTANEOUS EVERY 4 HOURS
Status: DISCONTINUED | OUTPATIENT
Start: 2024-09-06 | End: 2024-09-07

## 2024-09-06 RX ORDER — DEXTROSE 50 % IN WATER (D50W) INTRAVENOUS SYRINGE
12.5
Status: DISCONTINUED | OUTPATIENT
Start: 2024-09-06 | End: 2024-09-11

## 2024-09-06 RX ORDER — METRONIDAZOLE 500 MG/100ML
500 INJECTION, SOLUTION INTRAVENOUS EVERY 8 HOURS
Status: DISCONTINUED | OUTPATIENT
Start: 2024-09-06 | End: 2024-09-08

## 2024-09-06 RX ORDER — INSULIN LISPRO 100 [IU]/ML
0-10 INJECTION, SOLUTION INTRAVENOUS; SUBCUTANEOUS
Status: DISCONTINUED | OUTPATIENT
Start: 2024-09-07 | End: 2024-09-06

## 2024-09-06 ASSESSMENT — COGNITIVE AND FUNCTIONAL STATUS - GENERAL
HELP NEEDED FOR BATHING: TOTAL
DRESSING REGULAR UPPER BODY CLOTHING: TOTAL
EATING MEALS: TOTAL
TOILETING: TOTAL
PERSONAL GROOMING: TOTAL
DRESSING REGULAR LOWER BODY CLOTHING: TOTAL
DAILY ACTIVITIY SCORE: 6

## 2024-09-06 ASSESSMENT — PAIN - FUNCTIONAL ASSESSMENT
PAIN_FUNCTIONAL_ASSESSMENT: CPOT (CRITICAL CARE PAIN OBSERVATION TOOL)
PAIN_FUNCTIONAL_ASSESSMENT: UNABLE TO SELF-REPORT
PAIN_FUNCTIONAL_ASSESSMENT: CPOT (CRITICAL CARE PAIN OBSERVATION TOOL)

## 2024-09-06 NOTE — PROGRESS NOTES
Occupational Therapy    Occupational Therapy Treatment    Name: Travis Hunt  MRN: [id number]  : 1945  Date: 24  Room:       Time Calculation  Start Time:   Stop Time: 1503  Time Calculation (min): 47 min    Assessment:  End of Session Communication: Bedside nurse  End of Session Patient Position: Bed, 3 rail up, Alarm off, not on at start of session    Plan:  Treatment Interventions: ADL retraining, Visual perceptual retraining, Functional transfer training, UE strengthening/ROM, Cognitive reorientation, Patient/family training, Neuromuscular reeducation, Fine motor coordination activities, Endurance training, Equipment evaluation/education, Compensatory technique education, UE splinting, Continued evaluation  OT Frequency: 3 times per week  OT Discharge Recommendations: Moderate intensity level of continued care  Equipment Recommended upon Discharge:  (TBD)  OT Recommended Transfer Status: Dependent (in bed activity)  OT - OK to Discharge: Yes (when medically appropriate)    Subjective   General:  OT Last Visit  OT Received On: 24  Co-Treatment:  (rehab aide assisted as needed)  Prior to Session Communication: Bedside nurse, Physician  Patient Position Received: Bed, 3 rail up, Alarm off, not on at start of session  Family/Caregiver Present: Yes  Caregiver Feedback: Wife at bedside at start of session  General Comment: Pt supine in bed on arrival. ETT VC AC 40% FiO2, PEEP 5, RR 14, .03 vaso.     Precautions:  Medical Precautions: Fall precautions, Oxygen therapy device and L/min    Vitals:  Vital Signs (Past 2hrs)        Date/Time Vitals Session Patient Position Pulse Resp SpO2 BP MAP (mmHg)    24 1416 Pre OT  --  93  25  95 %  122/38  --     24 1503 Post OT  --  75  21  94 %  109/37  --                   Lines/Tubes/Drains:  CVC 24 Triple lumen Non-tunneled Right Internal jugular (Active)   Number of days: 10       Arterial Line 24 Left Radial (Active)   Number  of days: 6       ETT  7.5 mm (Active)   Number of days: 7       Urethral Catheter Straight-tip 16 Fr. (Active)   Number of days: 2       NG/OG/Feeding Tube OG - [place] Center mouth (Active)   Number of days: 7       Cognition:  Overall Cognitive Status: Impaired  Arousal/Alertness: Localized responses  Orientation Level: Unable to assess  Following Commands:  (No command following this date)  Cognition Comments: [facility] Level II    Pain Assessment:  Pain Assessment  Pain Assessment: Unable to self-report     Objective      Bed Mobility/Transfers:   Bed Mobility  Bed Mobility: Yes  Bed Mobility 1  Bed Mobility 1: Rolling left  Level of Assistance 1: Dependent, +2  Bed Mobility Comments 1: draw sheet, x2 trials  Bed Mobility 2  Bed Mobility  2: Forward lean  Level of Assistance 2: Dependent, +2  Bed Mobility Comments 2: draw sheet. +eye opening with forward lean. head flexed      Therapy/Activity:   Therapeutic Exercise  Therapeutic Exercise Performed: Yes  Therapeutic Exercise Activity 1: BUE PROM x5 repetitions including finger flexion/extension, wrist flexion extension, forearm pronation/supination, elbow flexion/extension, shoulder int/ext rotation and flexion. pt with spontanous RUE movement with all stimulation provided to GALINDO. GALINDO shoulder subluxation noted this date.  Therapeutic Activity  Therapeutic Activity Performed: Yes  Therapeutic Activity 1: Extensive time spent repositioning pt this date for edema management, joint/skin integrity and comfort. Pt with persistent neck left cervical rotation preference. pt with increased eye opening with position changes.  Balance/Neuromuscular Re-Education  Balance/Neuromuscular Re-Education Activity Performed: Yes  Balance/Neuromuscular Re-Education Activity 1: Provided a variety of sensory stimulation to patient in attempt to improve arousal and command following. Pt with active movement in all extremities with touch, but otherwise minimal change in  arousal noted. diffuse movement noted with noxious stim to extremities this date.    Outcome Measures:  [facility] Daily Activity  Putting on and taking off regular lower body clothing: Total  Bathing (including washing, rinsing, drying): Total  Putting on and taking off regular upper body clothing: Total  Toileting, which includes using toilet, bedpan or urinal: Total  Taking care of personal grooming such as brushing teeth: Total  Eating Meals: Total  Daily Activity - Total Score: 6  ICU Mobility Screen  ICU Mobility Scale: Sitting in bed, exercises in bed     Education Documentation  Body Mechanics, taught by Ariane Altman OT at 9/6/2024  3:39 PM.  Learner: Patient  Readiness: Nonacceptance  Method: Explanation, Demonstration  Response: No Evidence of Learning  Comment: Wife present and verbalized understanding    Precautions, taught by Ariane Altman OT at 9/6/2024  3:39 PM.  Learner: Patient  Readiness: Nonacceptance  Method: Explanation, Demonstration  Response: No Evidence of Learning  Comment: Wife present and verbalized understanding    Education Comments  No comments found.        Goals:  Encounter Problems       Encounter Problems (Active)       ADLs       Patient will complete grooming with MOD assist and min cues.   (Not Progressing)       Start:  08/28/24    Expected End:  09/11/24            Patient will complete UB dressing with MOD assist and min cues.   (Not Progressing)       Start:  08/28/24    Expected End:  09/11/24               BALANCE       Patient will demo sitting balance for at least 8 min with MOD A in prep for sitting ADLs. (Not Progressing)       Start:  08/28/24    Expected End:  09/11/24               COGNITION/SAFETY       Patient will demo sustained visual attention to at least 4 min with min v/c.  (Not Progressing)       Start:  08/28/24    Expected End:  09/11/24            Patient will be A&Ox4 using external memory strategies as needed and initially cueing  only.   (Not Progressing)       Start:  08/28/24    Expected End:  09/11/24               EXERCISE/STRENGTHENING       Patient will demo UE HEP with min v/c.  (Not Progressing)       Start:  08/28/24    Expected End:  09/11/24               MOBILITY       Patient will complete bed mobility with MOD A and min cues.    (Not Progressing)       Start:  08/28/24    Expected End:  09/11/24 09/06/24 at 3:41 PM   Ariane Altman, OT   [phone]

## 2024-09-06 NOTE — CONSULTS
Inpatient consult to Infectious Diseases  Consult performed by: Elis Ma  Consult ordered by: Godfrey Kent MD        Referred by Raysa Fitch MD    Primary MD: Niranjan Ferrari DO    Reason For Consult  Enterococcus on BAL    History Of Present Illness  Travis Hunt is a 78 y.o. 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 He was taken to the OR for right craniotomy and subdural hematoma evacuation on 8/24. Patient required intubation, sedation and pressors, and had acute hypoxic respiratory failure,     BAL on Aug 30 had cultures growing E. Coli and Strep agalactae, was started on cefepime.    BAL on 9/4 grew abundant E faecalis.     Past Medical History  He has a past medical history of Abnormal weight loss (12/22/2020) and Personal history of other venous thrombosis and embolism.    Surgical History  He has a past surgical history that includes Hemorrhoid surgery (06/11/2015); Cholecystectomy (06/11/2015); and Hand surgery (06/11/2015).     Social History     Occupational History    Not on file   Tobacco Use    Smoking status: Never     Passive exposure: Current (RAVINDER d/t pt condition)    Smokeless tobacco: Never    Tobacco comments:     RAVINDER d/t pt condition   Vaping Use    Vaping status: Unknown   Substance and Sexual Activity    Alcohol use: Not on file    Drug use: Not on file    Sexual activity: Not on file     Travel History   Travel since 08/06/24    No documented travel since 08/06/24            Pets: unknown  Hobbies: unknown    Family History  No family history on file.  Allergies  Iodinated contrast media, Penicillins, and Shellfish containing products     Immunization History   Administered Date(s) Administered    Pfizer COVID-19 vaccine, bivalent, age 12 years and older (30 mcg/0.3 mL) 10/26/2022     Pfizer Gray Cap SARS-CoV-2 06/18/2022    Pfizer Purple Cap SARS-CoV-2 03/18/2021, 04/08/2021, 11/03/2021     Medications  Home medications:  Medications Prior to Admission   Medication Sig Dispense Refill Last Dose    allopurinol (Zyloprim) 100 mg tablet TAKE 1 TABLET TWICE A  tablet 3 8/23/2024    ammonium lactate (Lac-Hydrin) 12 % lotion Apply 1 Application topically once daily as needed.   Past Week    atorvastatin (Lipitor) 40 mg tablet Take 1 tablet (40 mg) by mouth once daily at bedtime.   8/23/2024    clopidogrel (Plavix) 75 mg tablet Take 1 tablet (75 mg) by mouth once daily in the evening.   8/23/2024    clotrimazole-betamethasone (Lotrisone) lotion Apply topically every 2 hours if needed (apply every 2 hours as needed if itching persists). 150 mL 3 Past Week    famotidine (Pepcid) 20 mg tablet Take 1 tablet (20 mg) by mouth once daily in the evening.   8/23/2024    fexofenadine (Allegra) 180 mg tablet Take 1 tablet (180 mg) by mouth once daily in the evening.   8/23/2024    lidocaine (Lidoderm) 5 % patch PLACE 1 PATCH OVER 12 HOURS ON THE SKIN ONCE DAILY. APPLY TO PAINFUL AREA 12 HOURS PER DAY, REMOVE FOR 12 HOURS. (Patient taking differently: Place 1 patch on the skin once daily as needed. Apply to painful area 12 hours per day, remove for 12 hours.) 30 patch 3 Past Week    meclizine (Antivert) 25 mg tablet TAKE 1 TABLET BY MOUTH THREE TIMES A DAY AS NEEDED (Patient taking differently: Take 1 tablet (25 mg) by mouth once daily at bedtime.) 90 tablet 3 8/23/2024    metaxalone (Skelaxin) 800 mg tablet TAKE 1 TABLET DAILY (Patient taking differently: Take 1 tablet (800 mg) by mouth once daily as needed.) 90 tablet 3 Past Week    oxyCODONE-acetaminophen (Percocet) 7.5-325 mg tablet Take 1 tablet by mouth every 8 hours if needed.   Past Week    tamsulosin (Flomax) 0.4 mg 24 hr capsule Take 1 capsule (0.4 mg) by mouth once daily in the evening.   8/23/2024    warfarin (Coumadin) 5 mg tablet TAKE 1  TABLET (5 MG) FIVE DAYS A WEEK AND TAKE ONE-HALF (1/2) TABLET (2.5 MG) TWO DAYS A WEEK 90 tablet 3 8/23/2024    ciclopirox (Penlac) 8 % solution APPLY TO AFFECTED NAILS ONCE DAILY. REMOVE ONCE WEEKLY WITH ALCOHOL   Unknown    nitroglycerin (Nitrostat) 0.4 mg SL tablet TAKE 1 TABS SUBLINGUAL EVERY 5 MINUTES AS NEEDED FOR CHEST PAIN   Unknown    omeprazole (PriLOSEC) 20 mg DR capsule TAKE 1 CAPSULE DAILY (Patient not taking: Reported on 8/24/2024) 90 capsule 3 Unknown     Current medications:  Scheduled medications  atorvastatin, 40 mg, oral, Nightly  cefepime, 2 g, intravenous, q12h  oxygen, , inhalation, Continuous - Inhalation  pantoprazole, 40 mg, intravenous, Daily  perflutren lipid microspheres, 2 mL of dilution, intravenous, Once in imaging  perflutren protein A microsphere, 0.5 mL, intravenous, Once in imaging  polyethylene glycol, 17 g, oral, Daily  sennosides-docusate sodium, 1 tablet, oral, Nightly  sulfur hexafluoride microsphr, 2 mL, intravenous, Once in imaging      Continuous medications  dexmedeTOMIDine, 0.2-1.5 mcg/kg/hr, Last Rate: Stopped (09/02/24 1740)  fentaNYL,  mcg/hr, Last Rate: Stopped (09/03/24 1130)  norepinephrine, 0-0.5 mcg/kg/min, Last Rate: Stopped (09/06/24 0530)  sodium chloride 0.9%, 50 mL/hr, Last Rate: 50 mL/hr (09/06/24 1400)  vasopressin, 0.03 Units/min, Last Rate: 0.03 Units/min (09/06/24 1400)      PRN medications  PRN medications: acetaminophen, alteplase, alteplase, lubricating eye drops, vancomycin    Review of Systems unable to assess as patient is intubated     Objective  Range of Vitals (last 24 hours)  Heart Rate:  []   Temp:  [36.2 °C (97.2 °F)-37 °C (98.6 °F)]   Resp:  [14-42]   BP: (102-120)/(30-39)   SpO2:  [90 %-100 %]   Daily Weight  09/02/24 : (!) 155 kg (341 lb 14.9 oz)    Body mass index is 50.47 kg/m².     Physical Exam  Constitutional:       Appearance: He is ill-appearing and diaphoretic.      Comments: Intubated   Cardiovascular:      Rate and  Rhythm: Normal rate and regular rhythm.   Pulmonary:      Comments: Intubated, no wheezes  Abdominal:      Palpations: Abdomen is soft.          Relevant Results  Outside Hospital Results  Yes - SW general  Labs  Results from last 72 hours   Lab Units 09/06/24 0446 09/05/24 0158 09/04/24 0344   WBC AUTO x10*3/uL 11.4* 16.1* 14.0*   HEMOGLOBIN g/dL 6.2* 7.2* 7.7*   HEMATOCRIT % 18.5* 20.7* 22.5*   PLATELETS AUTO x10*3/uL 251 281 260   NEUTROS PCT AUTO % 75.0  --   --    LYMPHS PCT AUTO % 8.0  --   --    MONOS PCT AUTO % 7.7  --   --    EOS PCT AUTO % 3.5  --   --      Results from last 72 hours   Lab Units 09/06/24 0446 09/05/24 0158 09/04/24 0344 09/03/24  1821   SODIUM mmol/L 135* 138 141 142   POTASSIUM mmol/L 4.0 4.1 4.1 4.0   CHLORIDE mmol/L 105 105 107 108*   CO2 mmol/L 22 22 26 26   BUN mg/dL 49* 39* 35* 35*   CREATININE mg/dL 1.71* 1.53* 1.15 1.12   GLUCOSE mg/dL 220* 196* 148* 171*   CALCIUM mg/dL 7.9* 7.9* 9.5 8.4*   ANION GAP mmol/L 12 15 12 12   EGFR mL/min/1.73m*2 41* 47* 66 68   PHOSPHORUS mg/dL  --  2.9 4.1 3.5     Results from last 72 hours   Lab Units 09/06/24 0446 09/05/24 0158 09/04/24 0344   ALK PHOS U/L 72  --   --    BILIRUBIN TOTAL mg/dL 1.1  --   --    BILIRUBIN DIRECT mg/dL 0.4*  --   --    PROTEIN TOTAL g/dL 5.0*  --   --    ALT U/L 30  --   --    AST U/L 45*  --   --    ALBUMIN g/dL 2.2* 2.2* 2.6*     Estimated Creatinine Clearance: 54.1 mL/min (A) (by C-G formula based on SCr of 1.71 mg/dL (H)).    Microbiology  Susceptibility data from last 90 days.  Collected Specimen Info Organism Ampicillin Cefazolin Ciprofloxacin Gentamicin Levofloxacin Piperacillin/Tazobactam Trimethoprim/Sulfamethoxazole   09/04/24 Fluid from BAL Enterococcus faecalis            Mixed Microorganisms Resembling Upper Respiratory Isis             Mixed Anaerobic Bacteria          08/30/24 Fluid from BAL Escherichia coli  S  S  S  S  S  S  S     Streptococcus agalactiae (Group B Streptococcus)           08/30/24 Fluid from BAL Escherichia coli  S  S  S  S  S  S  S     Streptococcus agalactiae (Group B Streptococcus)                Imaging    MR brain w and wo IV contrast    Result Date: 9/5/2024  Interpreted By:  Raghav Yo and Liller Gregory STUDY: MR BRAIN W AND WO IV CONTRAST; MR VENOGRAPHY INTRACRANIAL W IV CONTRAST; 9/5/2024 2:11 pm; 9/5/2024 2:14 pm   INDICATION: Signs/Symptoms:rule out any intracranial infection or venous sinus thrombosis.   COMPARISON: CT head 09/03/2024, 08/27/2024, 08/26/2024, 08/25/2024   ACCESSION NUMBER(S): [id number]; [id number]   ORDERING CLINICIAN: JEOVANY PATEL   TECHNIQUE: MRI of the brain was performed with the acquisition of axial diffusion-weighted, axial T1, axial FLAIR, axial T2 gradient echo, axial T2 fat saturated, axial T1 fat saturated postcontrast sequence, and axial T1 volumetric post-contrast sequence with multiplanar reformats.   MRV of the head was performed with the acquisition of sagittal T1 precontrast and postcontrast sequences. Segmented MIPs are provided.   Contrast: 30 mL of Dotarem was injected intravenously.   FINDINGS: Similar postoperative changes of right lateral craniotomy. Redemonstration of large component of extra-axial, likely subdural blood products surrounding the bilateral cerebral hemispheres most significantly overlying the right cerebral hemisphere and right paramedian falx. This hemorrhage demonstrates T1 hyperintense signal throughout and accounting for differences in imaging technique is unchanged in overall thickness compared to the prior CT head. Subdural fluid is thickest along the posterior right interhemispheric falx/superior leaflet of the right tentorium cerebellum measuring 1.3 cm in maximum thickness. There is essentially stable surrounding mass effect including sulcal effacement.   Within the right frontal lobe, there is T1 hyperintense signal with internal T1 hypointense signal and internal regions of the  susceptibility artifact which is most consistent with evolving hemorrhagic contusion. There is enhancement located within the surrounding right frontal lobe which is probably reactive in etiology with infectious/inflammatory process favored less likely. This region demonstrates increased signal on the diffusion-weighted sequence with no striking decreased signal on the ADC map. There is associated FLAIR hyperintense signal located in this region of enhancement which extends into the anterior portion of the body of the corpus callosum and there is also abnormal enhancement located in the corpus callosum.   There are curvilinear regions of enhancement and gyral T2 hyperintensity located within the inferior right temporal lobe which may be sequela of prior contusion.   There is a small region of T2/FLAIR hyperintensity located within the right parietal lobe (series 3, image 23 of 40) without striking associated enhancement or susceptibility artifact and is most consistent with a contusion. There is surrounding sulcal effacement.   There is essentially stable effacement of multiple sulci in the right cerebral hemisphere and there is stable surrounding mass effect including narrowing of the bilateral ventricles, most pronounced involving the left frontal horn with left word subfalcine herniation and midline shift at the level of the septum pellucidum measuring 1.4 cm, unchanged when measured in similar technique as the prior MRI.   Subtle signal abnormality located within some of the right cerebral hemispheric sulci is most consistent with proteinaceous/hemorrhagic material, likely related to prior subarachnoid hemorrhage with infectious material not excluded.  There is prominence of vessels located within the effaced sulci in the right cerebral hemisphere.   There is minimal dural-based enhancement along the right lateral cerebral hemisphere underlying the craniotomy consistent with reactive postoperative changes.    Moderate mucosal thickening noted within the left-greater-than-right maxillary sinuses, ethmoid air cells, sphenoid sinuses along with bilateral mastoid air cell effusions.   Vasculature/venogram:   Visualized flow voids display expected low signal and enhancement. The dural venous sinuses and cavernous sinus enhance diffusely. There is no filling defect to suggest thrombus. Small apparent filling defect located within the left transverse sinuses consistent with an arachnoid granulation, normal finding.       1. Accounting for differences in imaging technique, stable subdural hemorrhages along the bilateral cerebral convexities and hemorrhages located within the right cerebral hemisphere with surrounding mass effect including leftward midline shift.   2. There is signal abnormality with enhancement located within the right frontal lobe surrounding the hemorrhagic contusion and located within the inferior right temporal lobe and signal abnormality located within the right parietal lobe which are most consistent with evolving changes from prior contusion. Appearance is not typical for an infectious/inflammatory process. Attention on follow-up imaging.   3. Postoperative changes from prior right lateral craniotomy. Underlying smooth dural enhancement is favored postsurgical in etiology.   4. Subtle signal abnormality seen on the FLAIR sequence within few sulci in the right cerebral hemispheres favored to reflect proteinaceous/hemorrhagic material, likely related to prior trauma, with infectious material not excluded.   5. No thrombus is seen within the visualized dural venous sinuses.   I personally reviewed the images/study and I agree with the findings as stated above by resident physician, Dr. Rishabh Eric.   MACRO: None   Signed by: Raghav Yo 9/5/2024 3:21 PM Dictation workstation:   [id number]    MR venography intracranial w IV contrast    Result Date: 9/5/2024  Interpreted By:  Raghav Yo,  and  Jeaneth Keating STUDY: MR BRAIN W AND WO IV CONTRAST; MR VENOGRAPHY INTRACRANIAL W IV CONTRAST; 9/5/2024 2:11 pm; 9/5/2024 2:14 pm   INDICATION: Signs/Symptoms:rule out any intracranial infection or venous sinus thrombosis.   COMPARISON: CT head 09/03/2024, 08/27/2024, 08/26/2024, 08/25/2024   ACCESSION NUMBER(S): [id number]; [id number]   ORDERING CLINICIAN: JEOVANY PATEL   TECHNIQUE: MRI of the brain was performed with the acquisition of axial diffusion-weighted, axial T1, axial FLAIR, axial T2 gradient echo, axial T2 fat saturated, axial T1 fat saturated postcontrast sequence, and axial T1 volumetric post-contrast sequence with multiplanar reformats.   MRV of the head was performed with the acquisition of sagittal T1 precontrast and postcontrast sequences. Segmented MIPs are provided.   Contrast: 30 mL of Dotarem was injected intravenously.   FINDINGS: Similar postoperative changes of right lateral craniotomy. Redemonstration of large component of extra-axial, likely subdural blood products surrounding the bilateral cerebral hemispheres most significantly overlying the right cerebral hemisphere and right paramedian falx. This hemorrhage demonstrates T1 hyperintense signal throughout and accounting for differences in imaging technique is unchanged in overall thickness compared to the prior CT head. Subdural fluid is thickest along the posterior right interhemispheric falx/superior leaflet of the right tentorium cerebellum measuring 1.3 cm in maximum thickness. There is essentially stable surrounding mass effect including sulcal effacement.   Within the right frontal lobe, there is T1 hyperintense signal with internal T1 hypointense signal and internal regions of the susceptibility artifact which is most consistent with evolving hemorrhagic contusion. There is enhancement located within the surrounding right frontal lobe which is probably reactive in etiology with infectious/inflammatory process favored less  likely. This region demonstrates increased signal on the diffusion-weighted sequence with no striking decreased signal on the ADC map. There is associated FLAIR hyperintense signal located in this region of enhancement which extends into the anterior portion of the body of the corpus callosum and there is also abnormal enhancement located in the corpus callosum.   There are curvilinear regions of enhancement and gyral T2 hyperintensity located within the inferior right temporal lobe which may be sequela of prior contusion.   There is a small region of T2/FLAIR hyperintensity located within the right parietal lobe (series 3, image 23 of 40) without striking associated enhancement or susceptibility artifact and is most consistent with a contusion. There is surrounding sulcal effacement.   There is essentially stable effacement of multiple sulci in the right cerebral hemisphere and there is stable surrounding mass effect including narrowing of the bilateral ventricles, most pronounced involving the left frontal horn with left word subfalcine herniation and midline shift at the level of the septum pellucidum measuring 1.4 cm, unchanged when measured in similar technique as the prior MRI.   Subtle signal abnormality located within some of the right cerebral hemispheric sulci is most consistent with proteinaceous/hemorrhagic material, likely related to prior subarachnoid hemorrhage with infectious material not excluded.  There is prominence of vessels located within the effaced sulci in the right cerebral hemisphere.   There is minimal dural-based enhancement along the right lateral cerebral hemisphere underlying the craniotomy consistent with reactive postoperative changes.   Moderate mucosal thickening noted within the left-greater-than-right maxillary sinuses, ethmoid air cells, sphenoid sinuses along with bilateral mastoid air cell effusions.   Vasculature/venogram:   Visualized flow voids display expected low signal  and enhancement. The dural venous sinuses and cavernous sinus enhance diffusely. There is no filling defect to suggest thrombus. Small apparent filling defect located within the left transverse sinuses consistent with an arachnoid granulation, normal finding.       1. Accounting for differences in imaging technique, stable subdural hemorrhages along the bilateral cerebral convexities and hemorrhages located within the right cerebral hemisphere with surrounding mass effect including leftward midline shift.   2. There is signal abnormality with enhancement located within the right frontal lobe surrounding the hemorrhagic contusion and located within the inferior right temporal lobe and signal abnormality located within the right parietal lobe which are most consistent with evolving changes from prior contusion. Appearance is not typical for an infectious/inflammatory process. Attention on follow-up imaging.   3. Postoperative changes from prior right lateral craniotomy. Underlying smooth dural enhancement is favored postsurgical in etiology.   4. Subtle signal abnormality seen on the FLAIR sequence within few sulci in the right cerebral hemispheres favored to reflect proteinaceous/hemorrhagic material, likely related to prior trauma, with infectious material not excluded.   5. No thrombus is seen within the visualized dural venous sinuses.   I personally reviewed the images/study and I agree with the findings as stated above by resident physician, Dr. Rishabh Eric.   MACRO: None   Signed by: Raghav Yo 9/5/2024 3:21 PM Dictation workstation:   [id number]      Assessment/Plan     Travis Hunt is a 78 y.o. male with a PMH significant for HLD, HTN, CAD, DVT/PEs (on coumadin), renal cell carcinoma s/p nephrectomy who presented as a fall backward from a chair with positive headstrike who was diagnosed with a large subdural hematoma with midline shift. He was taken to the OR for right craniotomy and subdural  hematoma evacuation on 8/24. Patient required intubation, sedation and pressors, and had acute hypoxic respiratory failure.    BAL on Aug 30 had cultures growing E. Coli and Strep agalactae, was started on cefepime. BAL on 9/4 grew abundant E faecalis. Patient was febrile on 9/3, now afebrile, but WBC has increased to max of 16.1 over past 3 days. Blood cultures from 9/4 have no growth to date. Patient has not had increasing oxygen requirement. CXR has bibasilar opacities, which infectious process cannot be excluded.    We will treat as ventilator associated pneumonia. E. Faecalis is likely to be susceptible to vancomycin, so we will continue vanc and cefepime. We will add metronidazole to cover for anaerobes seen on BAL.    #Ventilator associated pneumonia   - Continue vancomycin and cefepime for 6 more days for a total of 7 days of VAP coverage  - Start metronidazole 500 mg TID (oral or IV per primary team)    Continue to follow blood cultures.    Patient seen and discussed with Dr. Radha Recinos.  Elis Ma, MS4      I saw and evaluated the patient. I personally obtained the key and critical portions of the history and physical exam or was physically present for key and critical portions performed by the student. I reviewed the student's documentation and discussed the patient with them. I agree with the student's medical decision making as documented in the note with the following additions:    Travis Hunt is a 77 y/o man pmhx as noted above admitted 8/24 after traumatic fall w/ SDH and emergent craniotomy on admission 8/24. Resp cx from BAL 8/30, 2 samples both with moderate E. coli and GBS. Blood cx from 9/4 are NGTD. 9/4 BAL repeated now w/ abundant E faecalis and mixed upper resp simon and abundant mixed anaerobic bacteria. PCN allergy documented therefore has been on vanc/cefepime; per wife she does not have any details of this.    On exam he is intubated/sedated, lungs clear and diminished anteriorly.  Cranial surgical incision without erythema. Overall has remained w/ low vent settings apart from acute increase in O2 needs on 8/30. Remains on and off pressors, currently on vaso. Bibasilar opacities on XR may be effusion vs infection. E faecalis is not a common PNA organism and high suspicion his BAL isolates may be reflecting colonization from his prolonged intubation. However, due to his recent worsening WBC, continued pressor requirements, and improvement w/ vanc and cefepime, would add metronidazole to fully cover his BAL cultures and treat for 7 days for VAP as noted above.       Radha Recinos, DO

## 2024-09-06 NOTE — PROGRESS NOTES
Spiritual Care Visit    Clinical Encounter Type  Visited With: Patient  Routine Visit: Follow-up  Continue Visiting: Yes  Crisis Visit: Critical care    Baptism Encounters  Baptism Needs: Prayer         Sacramental Encounters  Other Sacrament: P&B S    Patient was unresponsive, received a prayer and blessing (P&B S) by Fr. Osmel Armstrong,  Confucianist .    Patient had received the Sacrament of the Anointing of the Sick on 8/28/24.

## 2024-09-06 NOTE — PROGRESS NOTES
"Travis Hunt is a 78 y.o. male on day 13 of admission presenting with Subdural hematoma (Multi).    Subjective   No acute events overnight       Objective     Physical Exam  Eyes open to noxious stim  RUE purposeful  LUE withdrawing  BLE withdrawing R>L  Incision well-approximated with staples in place and no signs of dehiscence or drainage or erythema    Last Recorded Vitals  Blood pressure 129/51, pulse 80, temperature 36.5 °C (97.7 °F), temperature source Temporal, resp. rate 14, height 1.753 m (5' 9.02\"), weight (!) 155 kg (341 lb 14.9 oz), SpO2 96%.  Intake/Output last 3 Shifts:  I/O last 3 completed shifts:  In: 2714 (17.5 mL/kg) [I.V.:1714 (11.1 mL/kg); NG/GT:150; IV Piggyback:850]  Out: 1455 (9.4 mL/kg) [Urine:1455 (0.3 mL/kg/hr)]  Weight: 155.1 kg     Relevant Results               This patient has a central line   Reason for the central line remaining today? Line unnecessary, will be removed today    This patient has a urinary catheter   Reason for the urinary catheter remaining today? Urine catheter unnecessary, will be removed today    This patient is intubated   Reason for patient to remain intubated today? 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

## 2024-09-06 NOTE — SIGNIFICANT EVENT
"Preliminary EEG Report     This vEEG is indicative of a severe diffuse encephalopathy and right hemispheric structural lesion. No seizures seen.     This EEG was read from 13:00 and 13:23 on 09/06/24 . The final impression will be available tomorrow under Chart Review in the Media tab. To discontinue video EEG, place \"Discontinue Continuous VEEG\" order.      Kp Pinedo DO  Adult Epilepsy Fellow   [facility]    "

## 2024-09-06 NOTE — PROGRESS NOTES
[facility]  TRAUMA ICU - PROGRESS NOTE    Patient Name: Travis Hunt  MRN: [id number]  Admit Date: 824  : 1945  AGE: 78 y.o.   GENDER: male  ==============================================================================    MECHANISM OF INJURY:     79 yo male came to [facility] as a transfer from [facility] after a fall backwards from a chair.     LOC (yes/no?): unknown  Anticoagulant / Anti-platelet Rx? (for what dx?): ASA abd Plavix  Referring Facility Name (N/A for scene EMR run): [facility]     INJURIES:   R subdural hematoma with 1.5 cm Right to left shift     OTHER MEDICAL PROBLEMS:  Prior fracture deformity of L transverse process of L5.  R posterior rib fracture of 12th rib unchanged since scan on 22.    Hx. 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   Solid lung nodule in Right lower lobe increased in size from previous exam on 21 and now 0.7 X 0.6 cm.     PROCEDURES:  : Right craniotomy for SDH evacuation      ==============================================================================  TODAY'S ASSESSMENT AND PLAN OF CARE:  Travis Hunt is a 78 y.o. 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  MRI/MRV: no CVST or obvious infection. NSGY following.   - 7 day keppra course completed   - Q4 hr neurochecks   - HOB elevated 30 degrees or more  - EEG ordered overnight, rec'd by nsgy. Intermittent shaking of hands  - CT head in am per nsgy recs      # Acute pain  -as needed Tylenol     #Agitation while intubated  - off prop, on low dose fentanyl      RESPIRATORY: #Acute hypoxic respiratory failure secondary to  traumatic brain injury  - continue ps trials as tolerated  - daily CXR and ABG as needed      CARDIOVASC:   # H/o CAD, HTN, HLD - s/p coronary stent.   - Continue home atorvastatin 40 mg nightly  - holding home plavix and coumadin (home dosing Plavix 75 mg daily and coumadin 5 mg daily 5 days a week and 2.5 mg 2 days a week)  - no home medications for HTN      #Septic Shock 2/2 pneumonia  - levo weaned off, on vaso   - BAL with bronchoscopy on 8/30     - Cefepime was started at that time due to PCN allergy     - Cultures growing E. Coli and group B strep.  - continue cefepime 8/31-9/3; increased 9/to 2g q8h for CNS coverage     GI: #History of GERD  - Discontinue famotidine 20 mg daily  - Patient has a Dobbhoff in place, receiving Pivot 1.5 at 45 cc/h.    - Bowel regimen      :   #History of CKD 3 in setting of renal cell carcinoma.    - Currently around baseline creatinine of ~ 1.12  - Evans in place for strict I's and O's.    - daily RFP  - Creatinine uptrending, today 1.71      #History of BPH  - Holding home tamsulosin at this point, will restart when appropriate next     HEMATOLOGIC: # Acute blood loss anemia, stable  - Last received PRBCs on 8/27/2024.  Continue to hold home Plavix and warfarin at this time in setting of head bleed.   - continue to monitor CBC, transfuse as appropriate   - Hgb 6.2 today, 1 unit given    #Delayed serologic transfusion reaction  - Type and screen identified new antibody  - Hemolysis labs ordered: LDH, Haptoglobin, Direct/ Indirect bilirubin      ENDOCRINE: No active issues  - q 4hr glucose checks  - SSI, received 7 units in the past 24 hours      MUSCULOSKELETAL/SKIN: #Skin tear underlying pannus on the right  - Local wound care  - Patient and is on a specialty bed  - Mepilex to bony prominences.  Continue to help patient turn every 2 hours.     INFECTIOUS DISEASE: # Resolved Leukocytosis  - BAL with bronchoscopy 8/30    - Cultures growing out Escherichia coli and group B  strep    - Continue cefepime (allergy to penicillins) (started 8/30), continue for total of 5-7 day course     - 9/4 UA with reflex to culture. No growth on culture.     - 9/4 Resp culture: enterococcus, gram + cocci, gram - bacilli     - 9/4 vanco added   - ID consulted today      GI PROPHYLAXIS: N/A    DVT PROPHYLAXIS: SCDs, lovenox resumed (ok er NSGY)     DISPOSITION: ICU. Ongoing GOC discussion with family.     Pt. Seen and discussed with Dr. Morfin.     Arabella Kitchen, APRN-CNP  Trauma Surgery  [id number]    Total face to face time spent with patient/family of 30 minutes critical care time, with >50% of the time spent discussing plan of care/management, counseling/educating on disease processes, explaining results of diagnostic testing.              ==============================================================================  CHIEF COMPLAINT / OVERNIGHT EVENTS / HPI:   This am notified by pathology patient has a new antibody when type and screen resulted. Delayed serologic transfusion reaction.     MEDICAL HISTORY / ROS:  Admission history and ROS reviewed. Pertinent changes as follows:  Unable to obtain ROS d/t mentation/ intubation.     PHYSICAL EXAM:  Heart Rate:  [68-86]   Temp:  [36.5 °C (97.7 °F)-37.1 °C (98.8 °F)]   Resp:  [14-33]   BP: (129)/(51)   SpO2:  [90 %-100 %]   Physical Exam  Vitals reviewed.   Constitutional:       Appearance: He is obese.   HENT:      Head: Normocephalic and atraumatic.      Right Ear: External ear normal.      Left Ear: External ear normal.      Mouth/Throat:      Mouth: Mucous membranes are dry.      Pharynx: Oropharynx is clear.   Eyes:      Pupils: Pupils are equal, round, and reactive to light.   Neck:      Comments: Right internal jugular central line  in place   Cardiovascular:      Rate and Rhythm: Normal rate.      Pulses: Normal pulses.   Pulmonary:      Comments: Intubated, on vent  Abdominal:      Comments: Obese abd, soft, OG in place with tube feed infusing at  goal    Genitourinary:     Comments: Evans in place draining clear yellow urine   Musculoskeletal:      Right lower leg: Edema present.      Left lower leg: Edema present.      Comments: Generalized +2 edema    Skin:     Capillary Refill: Capillary refill takes less than 2 seconds.   Neurological:      Comments: E1 V1 M4  GCS 6T             LABS:  Results from last 7 days   Lab Units 09/06/24 0446 09/05/24 0158 09/04/24  0344   WBC AUTO x10*3/uL 11.4* 16.1* 14.0*   HEMOGLOBIN g/dL 6.2* 7.2* 7.7*   HEMATOCRIT % 18.5* 20.7* 22.5*   PLATELETS AUTO x10*3/uL 251 281 260   NEUTROS PCT AUTO % 75.0  --   --    LYMPHS PCT AUTO % 8.0  --   --    MONOS PCT AUTO % 7.7  --   --    EOS PCT AUTO % 3.5  --   --      Results from last 7 days   Lab Units 09/05/24 0158 09/03/24  1821 09/01/24  2357   APTT seconds 27 27 29   INR  1.3* 1.2* 1.2*     Results from last 7 days   Lab Units 09/06/24 0446 09/05/24 0158 09/04/24  0344   SODIUM mmol/L 135* 138 141   POTASSIUM mmol/L 4.0 4.1 4.1   CHLORIDE mmol/L 105 105 107   CO2 mmol/L 22 22 26   BUN mg/dL 49* 39* 35*   CREATININE mg/dL 1.71* 1.53* 1.15   CALCIUM mg/dL 7.9* 7.9* 9.5   PROTEIN TOTAL g/dL 5.0*  --   --    BILIRUBIN TOTAL mg/dL 1.1  --   --    ALK PHOS U/L 72  --   --    ALT U/L 30  --   --    AST U/L 45*  --   --    GLUCOSE mg/dL 220* 196* 148*     Results from last 7 days   Lab Units 09/06/24 0446   BILIRUBIN TOTAL mg/dL 1.1     Results from last 7 days   Lab Units 09/06/24 0446 09/05/24  0157 09/04/24  1514   POCT PH, ARTERIAL pH 7.42 7.40 7.46*   POCT PCO2, ARTERIAL mm Hg 35* 35* 32*   POCT PO2, ARTERIAL mm Hg 100* 103* 109*   POCT HCO3 CALCULATED, ARTERIAL mmol/L 22.7 21.7* 22.8   POCT BASE EXCESS, ARTERIAL mmol/L -1.6 -2.8* -0.7     I have reviewed all medications, laboratory results, and imaging pertinent for today's encounter.

## 2024-09-06 NOTE — PROGRESS NOTES
[facility]  TRAUMA SERVICE - PROGRESS NOTE    Patient Name: Travis Hunt  MRN: [id number]  Admit Date: 824  : 1945  AGE: 78 y.o.   GENDER: male  ==============================================================================  MECHANISM OF INJURY:   77 yo male came to [facility] as a transfer from [facility] after a fall backwards from a chair landing on the back of his head. + Headstrike. Unknown LOC. CTH with large SDH with midline shift and was transferred to [facility] for neurosurgical intervention and ICU admission. 3% NS at OSH. On arrival to [facility] campus, GCS 3. S/p emergent OR for decompressive craniotomy.     LOC (yes/no?): unknown  Anticoagulant / Anti-platelet Rx? (for what dx?): coumadin/ASA, plavix  Referring Facility Name (N/A for scene EMR run): [facility]    INJURIES:   R SDH with 1.5 cm midline shift    OTHER MEDICAL PROBLEMS:  HTN  HLD  CAD  PE/DVTs (on coumadin)  Renal cell carcinoma  VAP    INCIDENTAL FINDINGS:  Prior fracture deformity of L transverse process of L5.  R posterior rib fracture of 12th rib unchanged since scan on 22.  Densities around left kidney  Ventral hernia containing bowel segment.   Solid lung nodule in Right lower lobe increased in size from previous exam on 21 and now 0.7 X 0.6 cm    PROCEDURES:  : Right craniotomy for hematoma evacuation  : Bronchoscopy    ==============================================================================  TODAY'S ASSESSMENT AND PLAN OF CARE:  77 yo M s/p fall with R SDH on coumadin/ASA/plavix s/p decompressive craniotomy. Repeat CTH with continued blossoming of intraparenchymal hematoma but stable subdural and subarachnoid collections. Last CT head 24 - stable right frontal hematoma with surrounding edema, stable SDH. Now febrile, requiring levophed. Bronchoscopy  with BAL.    - Continue tube feeds at goal  - BAL cultures growing E. Coli and group B strep / new respiratory  cultures growing E. faecalis  - Continue cefepime/vanc - can deescalate once sensitivities return  - Will discuss trach/PEG with family after GOC discussion, neurosurgery prognostication  - DVT ppx: lovenox 30 mg BID    Seen and discussed with attending surgeon, Dr. Kent.    Elis Rodney MD  PGY4  General Surgery   Trauma Surgery    ==============================================================================  CHIEF COMPLAINT / OVERNIGHT EVENTS:   No acute overnight events.     MEDICAL HISTORY / ROS:  Admission history and ROS reviewed. Pertinent changes as follows:  None    PHYSICAL EXAM:  Heart Rate:  []   Temp:  [36.2 °C (97.2 °F)-37 °C (98.6 °F)]   Resp:  [14-35]   BP: (102-129)/(30-51)   SpO2:  [90 %-100 %]   Physical Exam  Constitutional:       Comments: Sedated   Eyes:      Pupils: Pupils are equal, round, and reactive to light.   Cardiovascular:      Rate and Rhythm: Normal rate and regular rhythm.   Pulmonary:      Effort: No respiratory distress.      Comments: Intubated  Abdominal:      General: There is no distension.      Palpations: Abdomen is soft.      Tenderness: There is no abdominal tenderness.   Skin:     General: Skin is warm and dry.   Neurological:      Comments: Spontaneously moves on right        IMAGING SUMMARY:    MRI/MRV 09/05: No new hemorrhages, evolution of contusions.    LABS:  Results from last 7 days   Lab Units 09/06/24  0446 09/05/24  0158 09/04/24  0344   WBC AUTO x10*3/uL 11.4* 16.1* 14.0*   HEMOGLOBIN g/dL 6.2* 7.2* 7.7*   HEMATOCRIT % 18.5* 20.7* 22.5*   PLATELETS AUTO x10*3/uL 251 281 260   NEUTROS PCT AUTO % 75.0  --   --    LYMPHS PCT AUTO % 8.0  --   --    MONOS PCT AUTO % 7.7  --   --    EOS PCT AUTO % 3.5  --   --      Results from last 7 days   Lab Units 09/05/24  0158 09/03/24  1821 09/01/24  2357   APTT seconds 27 27 29   INR  1.3* 1.2* 1.2*     Results from last 7 days   Lab Units 09/06/24  0446 09/05/24  0158 09/04/24  0344   SODIUM mmol/L 135* 138  141   POTASSIUM mmol/L 4.0 4.1 4.1   CHLORIDE mmol/L 105 105 107   CO2 mmol/L 22 22 26   BUN mg/dL 49* 39* 35*   CREATININE mg/dL 1.71* 1.53* 1.15   CALCIUM mg/dL 7.9* 7.9* 9.5   PROTEIN TOTAL g/dL 5.0*  --   --    BILIRUBIN TOTAL mg/dL 1.1  --   --    ALK PHOS U/L 72  --   --    ALT U/L 30  --   --    AST U/L 45*  --   --    GLUCOSE mg/dL 220* 196* 148*     Results from last 7 days   Lab Units 09/06/24  0446   BILIRUBIN TOTAL mg/dL 1.1   BILIRUBIN DIRECT mg/dL 0.4*       Results from last 7 days   Lab Units 09/06/24  0446 09/05/24  0157 09/04/24  1514   POCT PH, ARTERIAL pH 7.42 7.40 7.46*   POCT PCO2, ARTERIAL mm Hg 35* 35* 32*   POCT PO2, ARTERIAL mm Hg 100* 103* 109*   POCT HCO3 CALCULATED, ARTERIAL mmol/L 22.7 21.7* 22.8   POCT BASE EXCESS, ARTERIAL mmol/L -1.6 -2.8* -0.7       I have reviewed all medications, laboratory results, and imaging pertinent for today's encounter.

## 2024-09-07 LAB
ALBUMIN SERPL BCP-MCNC: 2.2 G/DL (ref 3.4–5)
ALP SERPL-CCNC: 65 U/L (ref 33–136)
ALT SERPL W P-5'-P-CCNC: 36 U/L (ref 10–52)
ANION GAP BLDA CALCULATED.4IONS-SCNC: 8 MMO/L (ref 10–25)
ANION GAP SERPL CALC-SCNC: 11 MMOL/L (ref 10–20)
AST SERPL W P-5'-P-CCNC: 52 U/L (ref 9–39)
B-LACTAMASE ORGANISM ISLT: POSITIVE
BACTERIA SPEC RESP CULT: ABNORMAL
BASE EXCESS BLDA CALC-SCNC: -2.8 MMOL/L (ref -2–3)
BASOPHILS # BLD MANUAL: 0.09 X10*3/UL (ref 0–0.1)
BASOPHILS NFR BLD MANUAL: 0.9 %
BILIRUB DIRECT SERPL-MCNC: 0.3 MG/DL (ref 0–0.3)
BILIRUB SERPL-MCNC: 1.2 MG/DL (ref 0–1.2)
BODY TEMPERATURE: 37 DEGREES CELSIUS
BUN SERPL-MCNC: 56 MG/DL (ref 6–23)
CA-I BLDA-SCNC: 1.22 MMOL/L (ref 1.1–1.33)
CALCIUM SERPL-MCNC: 7.5 MG/DL (ref 8.6–10.6)
CHLORIDE BLDA-SCNC: 109 MMOL/L (ref 98–107)
CHLORIDE SERPL-SCNC: 108 MMOL/L (ref 98–107)
CO2 SERPL-SCNC: 22 MMOL/L (ref 21–32)
CREAT SERPL-MCNC: 1.71 MG/DL (ref 0.5–1.3)
DIAGNOSIS-BB-PR33: NORMAL
EGFRCR SERPLBLD CKD-EPI 2021: 41 ML/MIN/1.73M*2
EOSINOPHIL # BLD MANUAL: 0.17 X10*3/UL (ref 0–0.4)
EOSINOPHIL NFR BLD MANUAL: 1.7 %
ERYTHROCYTE [DISTWIDTH] IN BLOOD BY AUTOMATED COUNT: 17.8 % (ref 11.5–14.5)
ERYTHROCYTE [DISTWIDTH] IN BLOOD BY AUTOMATED COUNT: 17.8 % (ref 11.5–14.5)
GLUCOSE BLD MANUAL STRIP-MCNC: 167 MG/DL (ref 74–99)
GLUCOSE BLD MANUAL STRIP-MCNC: 169 MG/DL (ref 74–99)
GLUCOSE BLD MANUAL STRIP-MCNC: 188 MG/DL (ref 74–99)
GLUCOSE BLD MANUAL STRIP-MCNC: 197 MG/DL (ref 74–99)
GLUCOSE BLD MANUAL STRIP-MCNC: 200 MG/DL (ref 74–99)
GLUCOSE BLD MANUAL STRIP-MCNC: 206 MG/DL (ref 74–99)
GLUCOSE BLDA-MCNC: 189 MG/DL (ref 74–99)
GLUCOSE SERPL-MCNC: 188 MG/DL (ref 74–99)
GRAM STN SPEC: ABNORMAL
HCO3 BLDA-SCNC: 21.6 MMOL/L (ref 22–26)
HCT VFR BLD AUTO: 21.4 % (ref 41–52)
HCT VFR BLD AUTO: 22.1 % (ref 41–52)
HCT VFR BLD EST: 17 % (ref 41–52)
HGB BLD-MCNC: 7.1 G/DL (ref 13.5–17.5)
HGB BLD-MCNC: 7.2 G/DL (ref 13.5–17.5)
HGB BLDA-MCNC: 5.5 G/DL (ref 13.5–17.5)
IMM GRANULOCYTES # BLD AUTO: 0.61 X10*3/UL (ref 0–0.5)
IMM GRANULOCYTES NFR BLD AUTO: 6 % (ref 0–0.9)
INHALED O2 CONCENTRATION: 40 %
LACTATE BLDA-SCNC: 1 MMOL/L (ref 0.4–2)
LYMPHOCYTES # BLD MANUAL: 0.52 X10*3/UL (ref 0.8–3)
LYMPHOCYTES NFR BLD MANUAL: 5.1 %
MAGNESIUM SERPL-MCNC: 2.07 MG/DL (ref 1.6–2.4)
MCH RBC QN AUTO: 28.9 PG (ref 26–34)
MCH RBC QN AUTO: 29.6 PG (ref 26–34)
MCHC RBC AUTO-ENTMCNC: 32.1 G/DL (ref 32–36)
MCHC RBC AUTO-ENTMCNC: 33.6 G/DL (ref 32–36)
MCV RBC AUTO: 86 FL (ref 80–100)
MCV RBC AUTO: 92 FL (ref 80–100)
METAMYELOCYTES # BLD MANUAL: 0.27 X10*3/UL
METAMYELOCYTES NFR BLD MANUAL: 2.6 %
MONOCYTES # BLD MANUAL: 0.61 X10*3/UL (ref 0.05–0.8)
MONOCYTES NFR BLD MANUAL: 6 %
MYELOCYTES # BLD MANUAL: 0.17 X10*3/UL
MYELOCYTES NFR BLD MANUAL: 1.7 %
NEUTS SEG # BLD MANUAL: 8.36 X10*3/UL (ref 1.6–5)
NEUTS SEG NFR BLD MANUAL: 82 %
NRBC BLD-RTO: 0 /100 WBCS (ref 0–0)
NRBC BLD-RTO: 0 /100 WBCS (ref 0–0)
OXYHGB MFR BLDA: 96.3 % (ref 94–98)
PATH REVIEW-TRANSFUSION REACTION: NORMAL
PCO2 BLDA: 34 MM HG (ref 38–42)
PH BLDA: 7.41 PH (ref 7.38–7.42)
PHOSPHATE SERPL-MCNC: 2.4 MG/DL (ref 2.5–4.9)
PLATELET # BLD AUTO: 212 X10*3/UL (ref 150–450)
PLATELET # BLD AUTO: 218 X10*3/UL (ref 150–450)
PO2 BLDA: 110 MM HG (ref 85–95)
POLYCHROMASIA BLD QL SMEAR: ABNORMAL
POTASSIUM BLDA-SCNC: 3.9 MMOL/L (ref 3.5–5.3)
POTASSIUM SERPL-SCNC: 4 MMOL/L (ref 3.5–5.3)
PROT SERPL-MCNC: 4.8 G/DL (ref 6.4–8.2)
RBC # BLD AUTO: 2.4 X10*6/UL (ref 4.5–5.9)
RBC # BLD AUTO: 2.49 X10*6/UL (ref 4.5–5.9)
RBC MORPH BLD: ABNORMAL
RESIDENT REVIEW-BB: NORMAL
SAO2 % BLDA: 100 % (ref 94–100)
SODIUM BLDA-SCNC: 135 MMOL/L (ref 136–145)
SODIUM SERPL-SCNC: 137 MMOL/L (ref 136–145)
TOTAL CELLS COUNTED BLD: 117
TYPE OF REACTION: NORMAL
VANCOMYCIN SERPL-MCNC: 13.4 UG/ML (ref 5–20)
WBC # BLD AUTO: 10 X10*3/UL (ref 4.4–11.3)
WBC # BLD AUTO: 10.2 X10*3/UL (ref 4.4–11.3)

## 2024-09-07 PROCEDURE — 2500000004 HC RX 250 GENERAL PHARMACY W/ HCPCS (ALT 636 FOR OP/ED): Performed by: NURSE PRACTITIONER

## 2024-09-07 PROCEDURE — 2500000004 HC RX 250 GENERAL PHARMACY W/ HCPCS (ALT 636 FOR OP/ED): Performed by: PHYSICIAN ASSISTANT

## 2024-09-07 PROCEDURE — 99233 SBSQ HOSP IP/OBS HIGH 50: CPT | Performed by: STUDENT IN AN ORGANIZED HEALTH CARE EDUCATION/TRAINING PROGRAM

## 2024-09-07 PROCEDURE — 85007 BL SMEAR W/DIFF WBC COUNT: CPT

## 2024-09-07 PROCEDURE — 2500000002 HC RX 250 W HCPCS SELF ADMINISTERED DRUGS (ALT 637 FOR MEDICARE OP, ALT 636 FOR OP/ED)

## 2024-09-07 PROCEDURE — 82248 BILIRUBIN DIRECT: CPT

## 2024-09-07 PROCEDURE — 94003 VENT MGMT INPAT SUBQ DAY: CPT

## 2024-09-07 PROCEDURE — 82435 ASSAY OF BLOOD CHLORIDE: CPT

## 2024-09-07 PROCEDURE — 71045 X-RAY EXAM CHEST 1 VIEW: CPT | Performed by: RADIOLOGY

## 2024-09-07 PROCEDURE — 83735 ASSAY OF MAGNESIUM: CPT

## 2024-09-07 PROCEDURE — 70450 CT HEAD/BRAIN W/O DYE: CPT | Performed by: RADIOLOGY

## 2024-09-07 PROCEDURE — 2500000005 HC RX 250 GENERAL PHARMACY W/O HCPCS: Performed by: STUDENT IN AN ORGANIZED HEALTH CARE EDUCATION/TRAINING PROGRAM

## 2024-09-07 PROCEDURE — 2020000001 HC ICU ROOM DAILY

## 2024-09-07 PROCEDURE — 85027 COMPLETE CBC AUTOMATED: CPT

## 2024-09-07 PROCEDURE — 2500000004 HC RX 250 GENERAL PHARMACY W/ HCPCS (ALT 636 FOR OP/ED)

## 2024-09-07 PROCEDURE — 99223 1ST HOSP IP/OBS HIGH 75: CPT

## 2024-09-07 PROCEDURE — 95715 VEEG EA 12-26HR INTMT MNTR: CPT

## 2024-09-07 PROCEDURE — 95720 EEG PHY/QHP EA INCR W/VEEG: CPT | Performed by: STUDENT IN AN ORGANIZED HEALTH CARE EDUCATION/TRAINING PROGRAM

## 2024-09-07 PROCEDURE — 2500000001 HC RX 250 WO HCPCS SELF ADMINISTERED DRUGS (ALT 637 FOR MEDICARE OP): Performed by: NURSE PRACTITIONER

## 2024-09-07 PROCEDURE — 84132 ASSAY OF SERUM POTASSIUM: CPT

## 2024-09-07 PROCEDURE — 37799 UNLISTED PX VASCULAR SURGERY: CPT

## 2024-09-07 PROCEDURE — 80202 ASSAY OF VANCOMYCIN: CPT | Performed by: PHYSICIAN ASSISTANT

## 2024-09-07 PROCEDURE — 95700 EEG CONT REC W/VID EEG TECH: CPT

## 2024-09-07 PROCEDURE — 2500000005 HC RX 250 GENERAL PHARMACY W/O HCPCS

## 2024-09-07 PROCEDURE — 82947 ASSAY GLUCOSE BLOOD QUANT: CPT

## 2024-09-07 PROCEDURE — 84100 ASSAY OF PHOSPHORUS: CPT

## 2024-09-07 RX ORDER — VANCOMYCIN HYDROCHLORIDE 1 G/200ML
1000 INJECTION, SOLUTION INTRAVENOUS EVERY 24 HOURS
Status: DISCONTINUED | OUTPATIENT
Start: 2024-09-07 | End: 2024-09-07

## 2024-09-07 RX ORDER — INSULIN LISPRO 100 [IU]/ML
0-15 INJECTION, SOLUTION INTRAVENOUS; SUBCUTANEOUS
Status: DISCONTINUED | OUTPATIENT
Start: 2024-09-07 | End: 2024-09-07

## 2024-09-07 RX ORDER — INSULIN LISPRO 100 [IU]/ML
0-15 INJECTION, SOLUTION INTRAVENOUS; SUBCUTANEOUS EVERY 4 HOURS
Status: DISCONTINUED | OUTPATIENT
Start: 2024-09-07 | End: 2024-09-10

## 2024-09-07 ASSESSMENT — PAIN - FUNCTIONAL ASSESSMENT
PAIN_FUNCTIONAL_ASSESSMENT: CPOT (CRITICAL CARE PAIN OBSERVATION TOOL)

## 2024-09-07 NOTE — PROGRESS NOTES
Vancomycin Dosing by Pharmacy- FOLLOW UP    Travis Hunt is a 78 y.o. year old male who Pharmacy has been consulted for vancomycin dosing for CNS/meningoencephalitis. Based on the patient's indication and renal status this patient is being dosed based on a goal AUC of 500-600.     Renal function is currently stable -- DARIUSZ from baseline    Current vancomycin dose: 1750 mg given every 24 hours    Estimated vancomycin AUC on current dose: 841 mg/L.hr     Visit Vitals  BP (!) 135/40   Pulse 79   Temp 36.6 °C (97.9 °F) (Temporal)   Resp 19        Lab Results   Component Value Date    CREATININE 1.71 (H) 2024    CREATININE 1.81 (H) 2024    CREATININE 1.71 (H) 2024    CREATININE 1.53 (H) 2024        Patient weight is as follows:   Vitals:    24 0300   Weight: (!) 155 kg (341 lb 14.9 oz)       Cultures:  Susceptibility data for the encounter in last 14 days.  Collected Specimen Info Organism Ampicillin Cefazolin Ciprofloxacin Gentamicin Levofloxacin Piperacillin/Tazobactam Trimethoprim/Sulfamethoxazole   24 Fluid from BAL Enterococcus faecalis            Mixed Microorganisms Resembling Upper Respiratory Isis             Mixed Anaerobic Bacteria          24 Fluid from BAL Escherichia coli  S  S  S  S  S  S  S     Streptococcus agalactiae (Group B Streptococcus)          24 Fluid from BAL Escherichia coli  S  S  S  S  S  S  S     Streptococcus agalactiae (Group B Streptococcus)               I/O last 3 completed shifts:  In: 5152.3 (33.2 mL/kg) [I.V.:2277.3 (14.7 mL/kg); Blood:690; NG/GT:1535; IV Piggyback:650]  Out: 2325 (15 mL/kg) [Urine:2075 (0.4 mL/kg/hr); Emesis/NG output:250]  Weight: 155.1 kg   I/O during current shift:  No intake/output data recorded.    Temp (24hrs), Av.3 °C (97.3 °F), Min:35.3 °C (95.5 °F), Max:36.9 °C (98.4 °F)      Assessment/Plan    Above goal AUC. Orders placed for new vancomcyin regimen of 1000 every 24 hours to begin at 0700.    This dosing  regimen is predicted by [employer] to result in the following pharmacokinetic parameters:  Regimen: 1000 mg IV every 24 hours.  Start time: 16:54 on 09/07/2024  Exposure target: AUC24 (range)500-600 mg/L.hr   AUC24,ss: 490 mg/L.hr  Probability of AUC24 > 400: 100 %  Ctrough,ss: 15.3 mg/L  Probability of Ctrough,ss > 20: 0 %  Probability of nephrotoxicity (Lodise ARTIE 2009): 11 %    The next level will be obtained on 9/9 at AM labs. May be obtained sooner if clinically indicated.   Will continue to monitor renal function daily while on vancomycin and order serum creatinine at least every 48 hours if not already ordered.  Follow for continued vancomycin needs, clinical response, and signs/symptoms of toxicity.       Iliana Damon, PharmD

## 2024-09-07 NOTE — PROGRESS NOTES
[facility]  TRAUMA ICU - PROGRESS NOTE    Patient Name: Travis Hunt  MRN: [id number]  Admit Date: 824  : 1945  AGE: 78 y.o.   GENDER: male  ==============================================================================    MECHANISM OF INJURY:     77 yo male came to [facility] as a transfer from [facility] after a fall backwards from a chair.     LOC (yes/no?): unknown  Anticoagulant / Anti-platelet Rx? (for what dx?): ASA abd Plavix  Referring Facility Name (N/A for scene EMR run): [facility]     INJURIES:   R subdural hematoma with 1.5 cm Right to left shift     OTHER MEDICAL PROBLEMS:  Prior fracture deformity of L transverse process of L5.  R posterior rib fracture of 12th rib unchanged since scan on 22.    Hx. 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   Solid lung nodule in Right lower lobe increased in size from previous exam on 21 and now 0.7 X 0.6 cm.     PROCEDURES:  : Right craniotomy for SDH evacuation      ==============================================================================  TODAY'S ASSESSMENT AND PLAN OF CARE:  Travis Hunt is a 78 y.o. 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  MRI/MRV: no CVST or obvious infection. NSGY following.   - 7 day keppra course completed   - Q4 hr neurochecks   - HOB elevated 30 degrees or more  - EEG showed no seizure activity  - CT head on  stable  -Neuro consult according to family wishes     # Acute pain  -as needed Tylenol     #Agitation while intubated  - off prop, on low dose fentanyl      RESPIRATORY: #Acute hypoxic respiratory failure secondary to  traumatic brain injury  - continue ps trials as tolerated  - daily CXR and ABG as needed      CARDIOVASC:   # H/o CAD, HTN, HLD - s/p coronary stent.   - Continue home atorvastatin 40 mg nightly  - holding home plavix and coumadin (home dosing Plavix 75 mg daily and coumadin 5 mg daily 5 days a week and 2.5 mg 2 days a week)  - no home medications for HTN      #Septic Shock 2/2 pneumonia  - levo weaned off, on vaso   - BAL with bronchoscopy on 8/30     - Cefepime was started at that time due to PCN allergy     - Cultures growing E. Coli and group B strep.  - continue cefepime 8/31-9/3; increased 9/to 2g q8h for CNS coverage     GI: #History of GERD  - Discontinue famotidine 20 mg daily  - Patient has a Dobbhoff in place, receiving Pivot 1.5 at 45 cc/h.    - Bowel regimen      :   #History of CKD 3 in setting of renal cell carcinoma.    - Currently around baseline creatinine of ~ 1.12  - Evans in place for strict I's and O's.    - daily RFP  - Creatinine stable, today 1.71      #History of BPH  - Holding home tamsulosin at this point, will restart when appropriate next     HEMATOLOGIC: # Acute blood loss anemia, stable  - Last received PRBCs on 8/27/2024.  Continue to hold home Plavix and warfarin at this time in setting of head bleed.   - continue to monitor CBC, transfuse as appropriate   - Hgb 6.2 today, 1 unit given    #Delayed serologic transfusion reaction  - Type and screen identified new antibody  - Hemolysis labs ordered: LDH, Haptoglobin, Direct/ Indirect bilirubin      ENDOCRINE: No active issues  - q 4hr glucose checks  - SSI, received 7 units in the past 24 hours      MUSCULOSKELETAL/SKIN: #Skin tear underlying pannus on the right  - Local wound care  - Patient is on a specialty bed  - Mepilex to bony prominences.  Continue to help patient turn every 2 hours.     INFECTIOUS DISEASE: # Resolved Leukocytosis  - BAL with bronchoscopy 8/30    - Cultures growing out Escherichia coli and group B strep    -  Continue cefepime (allergy to penicillins) (started 8/30), continue for total of 5-7 day course     - 9/4 UA with reflex to culture. No growth on culture.     - 9/4 Resp culture: enterococcus, gram + cocci, gram - bacilli     - 9/4 vanco added   - ID consulted today. Recommend Abx through 9/11     GI PROPHYLAXIS: N/A    DVT PROPHYLAXIS: SCDs, lovenox resumed     DISPOSITION: ICU. Ongoing GOC discussion with family.     Pt. Seen and discussed with Dr. Morfin.     Bulmaro Urias MD  PGY-1 General Surgery  [id number]    ==============================================================================  CHIEF COMPLAINT / OVERNIGHT EVENTS / HPI:   NAEON. VSS    MEDICAL HISTORY / ROS:  Admission history and ROS reviewed. Pertinent changes as follows:  Unable to obtain ROS d/t mentation/ intubation.     PHYSICAL EXAM:  Heart Rate:  [73-87]   Temp:  [35.3 °C (95.5 °F)-36.6 °C (97.9 °F)]   Resp:  [17-38]   BP: (135)/(40)   SpO2:  [93 %-99 %]   Physical Exam  Vitals reviewed.   Constitutional:       Appearance: He is obese.   HENT:      Head: Normocephalic and atraumatic.      Right Ear: External ear normal.      Left Ear: External ear normal.      Mouth/Throat:      Mouth: Mucous membranes are dry.      Pharynx: Oropharynx is clear.   Eyes:      Pupils: Pupils are equal, round, and reactive to light.   Neck:      Comments: Right internal jugular central line  in place   Cardiovascular:      Rate and Rhythm: Normal rate.      Pulses: Normal pulses.   Pulmonary:      Comments: Intubated, on vent  Abdominal:      Comments: Obese abd, soft, OG in place with tube feed infusing at goal    Genitourinary:     Comments: Evans in place draining clear yellow urine   Musculoskeletal:      Right lower leg: Edema present.      Left lower leg: Edema present.      Comments: Generalized +2 edema    Skin:     Capillary Refill: Capillary refill takes less than 2 seconds.   Neurological:      Comments: E1 V1 M4  GCS 6T       LABS:  Results from last 7  days   Lab Units 09/07/24  1233 09/07/24  0208 09/06/24  1807 09/06/24  1356 09/06/24  0446   WBC AUTO x10*3/uL 10.0 10.2 10.5 11.2 11.4*   HEMOGLOBIN g/dL 7.2* 7.1* 7.0* 6.6* 6.2*   HEMATOCRIT % 21.4* 22.1* 21.1* 19.3* 18.5*   PLATELETS AUTO x10*3/uL 218 212 215 236 251   NEUTROS PCT AUTO %  --   --  72.0 72.7 75.0   LYMPHO PCT MAN %  --  5.1  --   --   --    LYMPHS PCT AUTO %  --   --  10.1 9.9 8.0   MONO PCT MAN %  --  6.0  --   --   --    MONOS PCT AUTO %  --   --  8.4 8.3 7.7   EOSINO PCT MAN %  --  1.7  --   --   --    EOS PCT AUTO %  --   --  3.6 3.8 3.5     Results from last 7 days   Lab Units 09/05/24  0158 09/03/24  1821 09/01/24  2357   APTT seconds 27 27 29   INR  1.3* 1.2* 1.2*     Results from last 7 days   Lab Units 09/07/24 0208 09/06/24  1356 09/06/24  0446   SODIUM mmol/L 137 135* 135*   POTASSIUM mmol/L 4.0 3.9 4.0   CHLORIDE mmol/L 108* 105 105   CO2 mmol/L 22 23 22   BUN mg/dL 56* 55* 49*   CREATININE mg/dL 1.71* 1.81* 1.71*   CALCIUM mg/dL 7.5* 8.1* 7.9*   PROTEIN TOTAL g/dL 4.8*  --  5.0*   BILIRUBIN TOTAL mg/dL 1.2  --  1.1   ALK PHOS U/L 65  --  72   ALT U/L 36  --  30   AST U/L 52*  --  45*   GLUCOSE mg/dL 188* 198* 220*     Results from last 7 days   Lab Units 09/07/24 0208 09/06/24  0446   BILIRUBIN TOTAL mg/dL 1.2 1.1   BILIRUBIN DIRECT mg/dL 0.3 0.4*     Results from last 7 days   Lab Units 09/07/24  0209 09/06/24  0446 09/05/24  0157   POCT PH, ARTERIAL pH 7.41 7.42 7.40   POCT PCO2, ARTERIAL mm Hg 34* 35* 35*   POCT PO2, ARTERIAL mm Hg 110* 100* 103*   POCT HCO3 CALCULATED, ARTERIAL mmol/L 21.6* 22.7 21.7*   POCT BASE EXCESS, ARTERIAL mmol/L -2.8* -1.6 -2.8*     I have reviewed all medications, laboratory results, and imaging pertinent for today's encounter..    REASON FOR ADMISSION    TODAY'S EVENTS        This critically ill patient continues no longer continues  to be at-risk for clinically significant deterioration / failure due to the above mentioned dysfunctional, unstable  organ systems.  I have personally identified and managed all complex critical care issues to prevent aforementioned clinical deterioration.  Critical care time is spent at bedside and/or the immediate area and has included, but is not limited to, the review of diagnostic tests, labs, radiographs, serial assessments of hemodynamics, respiratory status, ventilatory management, and family updates.  Time spent in procedures and teaching are reported separately.     CRITICAL CARE TIME: minutes    LABS:  CMP:  Results from last 7 days   Lab Units 09/08/24  0302 09/07/24  0208 09/06/24  1356 09/06/24  0446 09/05/24  0158 09/04/24  0344 09/03/24  1821 09/03/24  0024 09/01/24  2357   SODIUM mmol/L 138 137 135* 135* 138 141 142 142 143   POTASSIUM mmol/L 4.0 4.0 3.9 4.0 4.1 4.1 4.0 4.4 4.2   CHLORIDE mmol/L 110* 108* 105 105 105 107 108* 109* 109*   CO2 mmol/L 20* 22 23 22 22 26 26 24 24   ANION GAP mmol/L 12 11 11 12 15 12 12 13 14   BUN mg/dL 61* 56* 55* 49* 39* 35* 35* 34* 31*   CREATININE mg/dL 1.92* 1.71* 1.81* 1.71* 1.53* 1.15 1.12 1.08 1.08   EGFR mL/min/1.73m*2 36* 41* 38* 41* 47* 66 68 71 71   MAGNESIUM mg/dL 2.08 2.07  --  2.27 1.86 1.99 1.88 2.01 2.04   ALBUMIN g/dL 2.2* 2.2* 2.3* 2.2* 2.2* 2.6* 2.2* 2.3* 2.4*   ALT U/L 35 36  --  30  --   --   --   --   --    AST U/L 45* 52*  --  45*  --   --   --   --   --    BILIRUBIN TOTAL mg/dL 0.9 1.2  --  1.1  --   --   --   --   --      CBC:  Results from last 7 days   Lab Units 09/08/24  0302 09/07/24  1233 09/07/24  0208 09/06/24  1807 09/06/24  1356 09/06/24  0446 09/05/24  0158 09/04/24  0344   WBC AUTO x10*3/uL 9.8 10.0 10.2 10.5 11.2 11.4* 16.1* 14.0*   HEMOGLOBIN g/dL 7.2* 7.2* 7.1* 7.0* 6.6* 6.2* 7.2* 7.7*   HEMATOCRIT % 22.8* 21.4* 22.1* 21.1* 19.3* 18.5* 20.7* 22.5*   PLATELETS AUTO x10*3/uL 215 218 212 215 236 251 281 260   MCV fL 92 86 92 88 89 93 89 93     COAG:   Results from last 7 days   Lab Units 09/05/24  0158 09/03/24  1821 09/01/24  2357   INR  1.3* 1.2*  1.2*     ABO:   ABO TYPE   Date Value Ref Range Status   09/06/2024 A  Final     HEME/ENDO:     CARDIAC:   Results from last 7 days   Lab Units 09/06/24  0446   LD U/L 389*        Seen discussed on round no additional changes to physical exam as noted  neuro sttus unchanged unresponsive will need trach peg soon Orlando Morfin MD

## 2024-09-07 NOTE — CARE PLAN
Problem: Skin  Goal: Prevent/manage excess moisture  Outcome: Progressing  Flowsheets (Taken 9/7/2024 1945)  Prevent/manage excess moisture:   Cleanse incontinence/protect with barrier cream   Monitor for/manage infection if present   Moisturize dry skin  Goal: Prevent/minimize sheer/friction injuries  Outcome: Progressing  Flowsheets (Taken 9/7/2024 1945)  Prevent/minimize sheer/friction injuries:   Use pull sheet   Complete micro-shifts as needed if patient unable. Adjust patient position to relieve pressure points, not a full turn   HOB 30 degrees or less   Turn/reposition every 2 hours/use positioning/transfer devices   Utilize specialty bed per algorithm  Goal: Promote/optimize nutrition  Outcome: Progressing  Goal: Promote skin healing  Outcome: Progressing  Flowsheets (Taken 9/7/2024 1945)  Promote skin healing:   Assess skin/pad under line(s)/device(s)   Protective dressings over bony prominences   Turn/reposition every 2 hours/use positioning/transfer devices   Ensure correct size (line/device) and apply per  instructions     Problem: Fall/Injury  Goal: Be free from injury by end of the shift  Outcome: Progressing     Problem: Safety - Medical Restraint  Goal: Remains free of injury from restraints (Restraint for Interference with Medical Device)  Outcome: Progressing

## 2024-09-07 NOTE — PROGRESS NOTES
Pharmacy to Dose Vancomycin:  Travis Hunt is a 78 y.o. male ordered pharmacy to dose vancomycin for meningitis. Today is day 4 of therapy.  Goal: Trough 15-20mg/L  Results from last 7 days   Lab Units 09/07/24  0208 09/06/24  1807 09/06/24  1356 09/06/24  0446   BUN mg/dL 56*  --  55* 49*   CREATININE mg/dL 1.71*  --  1.81* 1.71*   WBC AUTO x10*3/uL 10.2 10.5 11.2 11.4*   VANCOMYCIN RM ug/mL 13.4  --   --  10.0      Urine Culture   Date/Time Value Ref Range Status   09/04/2024 03:01 PM No growth  Final     Blood Culture   Date/Time Value Ref Range Status   09/04/2024 01:13 AM No growth at 3 days  Preliminary   09/04/2024 01:13 AM No growth at 3 days  Preliminary     Gram Stain   Date/Time Value Ref Range Status   09/04/2024 01:21 AM (2+) Few Polymorphonuclear leukocytes (A)  Preliminary   09/04/2024 01:21 AM (2+) Few Gram negative bacilli (A)  Preliminary   09/04/2024 01:21 AM (1+) Rare Gram positive cocci (A)  Preliminary      Susceptibility data from last 90 days.  Collected Specimen Info Organism Ampicillin Cefazolin Ciprofloxacin Gentamicin Levofloxacin Piperacillin/Tazobactam Trimethoprim/Sulfamethoxazole   09/04/24 Fluid from BAL Enterococcus faecalis            Mixed Microorganisms Resembling Upper Respiratory Isis             Mixed Anaerobic Bacteria          08/30/24 Fluid from BAL Escherichia coli  S  S  S  S  S  S  S     Streptococcus agalactiae (Group B Streptococcus)          08/30/24 Fluid from BAL Escherichia coli  S  S  S  S  S  S  S     Streptococcus agalactiae (Group B Streptococcus)            Antibiotics: Cefepime (Day 3), Flagyl (Day 2).  Pertinent Medications: Norepi drip, Contrast, Fluids.  Renal function remains stable.  Level 13.4 drawn today as a 21 hour level.     Plan:  Give vanco 1.5G x 1 dose today.  Follow-up level ordered for 9/8 AM (roughly 24hr level) unless clinically indicated sooner.  Pharmacy will continue daily vancomycin monitoring. Please contact the pharmacy with any  questions.    Thank you,  Yony Rizvi, RPh

## 2024-09-07 NOTE — PROGRESS NOTES
Travis Hunt is a 78 y.o. male on day 14 of admission presenting with Subdural hematoma (Multi).    Subjective   Interval History: no family at bedside today, intubated, sedated     Review of Systems    Objective   Range of Vitals (last 24 hours)  Heart Rate:  [72-87]   Temp:  [35.3 °C (95.5 °F)-36.6 °C (97.9 °F)]   Resp:  [9-38]   BP: (109-138)/(35-40)   SpO2:  [93 %-100 %]   Daily Weight  09/02/24 : (!) 155 kg (341 lb 14.9 oz)    Body mass index is 50.47 kg/m².    Physical Exam    Ill appearing elderly man, intubated, sedated  R cranial incision w/ stables removed, minimal erythema, no drainage   Lungs w/ clear coarse breath sounds b/l   RRR, S1/S2, no murmurs  Abd soft, obese  Edema in b/l UE and LE    Antibiotics  cefepime - 2 gram/100 mL  metroNIDAZOLE - 500 mg/100 mL  vancomycin    Relevant Results  Labs  Results from last 72 hours   Lab Units 09/07/24  1233 09/07/24  0208 09/06/24  1807 09/06/24  1356 09/06/24  0446   WBC AUTO x10*3/uL 10.0 10.2 10.5 11.2 11.4*   HEMOGLOBIN g/dL 7.2* 7.1* 7.0* 6.6* 6.2*   HEMATOCRIT % 21.4* 22.1* 21.1* 19.3* 18.5*   PLATELETS AUTO x10*3/uL 218 212 215 236 251   NEUTROS PCT AUTO %  --   --  72.0 72.7 75.0   LYMPHO PCT MAN %  --  5.1  --   --   --    LYMPHS PCT AUTO %  --   --  10.1 9.9 8.0   MONO PCT MAN %  --  6.0  --   --   --    MONOS PCT AUTO %  --   --  8.4 8.3 7.7   EOSINO PCT MAN %  --  1.7  --   --   --    EOS PCT AUTO %  --   --  3.6 3.8 3.5     Results from last 72 hours   Lab Units 09/07/24  0208 09/06/24  1356 09/06/24  0446 09/05/24  0158   SODIUM mmol/L 137 135* 135* 138   POTASSIUM mmol/L 4.0 3.9 4.0 4.1   CHLORIDE mmol/L 108* 105 105 105   CO2 mmol/L 22 23 22 22   BUN mg/dL 56* 55* 49* 39*   CREATININE mg/dL 1.71* 1.81* 1.71* 1.53*   GLUCOSE mg/dL 188* 198* 220* 196*   CALCIUM mg/dL 7.5* 8.1* 7.9* 7.9*   ANION GAP mmol/L 11 11 12 15   EGFR mL/min/1.73m*2 41* 38* 41* 47*   PHOSPHORUS mg/dL 2.4* 2.7  --  2.9     Results from last 72 hours   Lab Units  "09/07/24  0208 09/06/24  1356 09/06/24  0446   ALK PHOS U/L 65  --  72   BILIRUBIN TOTAL mg/dL 1.2  --  1.1   BILIRUBIN DIRECT mg/dL 0.3  --  0.4*   PROTEIN TOTAL g/dL 4.8*  --  5.0*   ALT U/L 36  --  30   AST U/L 52*  --  45*   ALBUMIN g/dL 2.2* 2.3* 2.2*     Estimated Creatinine Clearance: 54.1 mL/min (A) (by C-G formula based on SCr of 1.71 mg/dL (H)).  No results found for: \"CRP\"  Microbiology  Susceptibility data from last 14 days.  Collected Specimen Info Organism Ampicillin Cefazolin Ciprofloxacin Gentamicin Gentamicin Synergy Levofloxacin Penicillin Piperacillin/Tazobactam Trimethoprim/Sulfamethoxazole Vancomycin   09/04/24 Fluid from BAL Enterococcus faecalis  S     R   S   R  S     Mixed Microorganisms Resembling Upper Respiratory Simon                Mixed Anaerobic Bacteria             08/30/24 Fluid from BAL Escherichia coli  S  S  S  S   S   S  S      Streptococcus agalactiae (Group B Streptococcus)             08/30/24 Fluid from BAL Escherichia coli  S  S  S  S   S   S  S      Streptococcus agalactiae (Group B Streptococcus)                   Imaging        Assessment/Plan     Travis Hunt is a 77 y/o man admitted 8/24 after traumatic fall w/ SDH and emergent craniotomy on admission 8/24. Resp cx from BAL 8/30, 2 samples both with moderate E. coli and GBS. Blood cx from 9/4 are NGTD. 9/4 BAL repeated now w/ abundant E faecalis and mixed upper resp simon and abundant mixed anaerobic bacteria. PCN allergy documented therefore has been on vanc/cefepime. Overall has remained w/ low vent settings apart from acute increase in O2 needs on 8/30. Bibasilar opacities on XR may be effusion vs infection. E faecalis is not a common PNA organism and high suspicion his BAL isolates may be reflecting colonization from his prolonged intubation. However, due to his recent worsening WBC, continued pressor requirements, and improvement w/ vanc and cefepime, recommended metronidazole to fully cover his BAL cultures and " treat for 7 days for VAP. Today he is off pressors, WBC normalized, vent settings still low, and overall improving. Would complete his course for VAP as planned.       #Possible VAP      Recommendations:  -Cont IV vanc, dosed w/ pharmacy  -Cont IV cefepime 2gm q12H  -Cont IV metronidazole 500mg q8H   -Plan for above abx for total 7 days, through 9/11 then stop      Will sign off but please reach out w/ questions     Radha Recinos, DO

## 2024-09-07 NOTE — SIGNIFICANT EVENT
Neurosurgery will sign off at this time. CTH shows minimally improved contusions and shift. No acute neurosurgical intervention. Wound check done, staples dc'd today and wound is clean, dry and intact. We have arranged for follow up on 9/16 with repeat CT Head w/ Dr. Dhillon's staff, hold plavix until this follow up. Okay for DVT chemoppx. Please page [phone] with any questions or concerns.

## 2024-09-07 NOTE — PROGRESS NOTES
[facility]  TRAUMA SERVICE - PROGRESS NOTE    Patient Name: Travis Hunt  MRN: [id number]  Admit Date: 824  : 1945  AGE: 78 y.o.   GENDER: male  ==============================================================================  MECHANISM OF INJURY:   79 yo male came to [facility] as a transfer from [facility] after a fall backwards from a chair landing on the back of his head. + Headstrike. Unknown LOC. CTH with large SDH with midline shift and was transferred to [facility] for neurosurgical intervention and ICU admission. 3% NS at OSH. On arrival to [facility], GCS 3. S/p emergent OR for decompressive craniotomy.     LOC (yes/no?): unknown  Anticoagulant / Anti-platelet Rx? (for what dx?): coumadin/ASA, plavix  Referring Facility Name (N/A for scene EMR run): [facility]    INJURIES:   R SDH with 1.5 cm midline shift    OTHER MEDICAL PROBLEMS:  HTN  HLD  CAD  PE/DVTs (on coumadin)  Renal cell carcinoma  VAP    INCIDENTAL FINDINGS:  Prior fracture deformity of L transverse process of L5.  R posterior rib fracture of 12th rib unchanged since scan on 22.  Densities around left kidney  Ventral hernia containing bowel segment.   Solid lung nodule in Right lower lobe increased in size from previous exam on 21 and now 0.7 X 0.6 cm    PROCEDURES:  : Right craniotomy for hematoma evacuation  : Bronchoscopy    ==============================================================================  TODAY'S ASSESSMENT AND PLAN OF CARE:  79 yo M s/p fall with R SDH on coumadin/ASA/plavix s/p decompressive craniotomy. Repeat CTH with continued blossoming of intraparenchymal hematoma but stable subdural and subarachnoid collections. Last CT head 24 - stable right frontal hematoma with surrounding edema, stable SDH. Now febrile, requiring levophed. Bronchoscopy  with BAL. E faecalis from respiratory cultures    - Continue tube feeds at goal  - BAL cultures growing E. Coli  and group B strep / new respiratory cultures growing E. faecalis  - Continue cefepime/flagyl - Infectious Disease following  - Will discuss trach/PEG with family after GOC discussion, neurosurgery prognostication  - DVT ppx: lovenox 30 mg BID    Seen and discussed with attending surgeon, Dr. Kent.    Elis Rodney MD  PGY4  General Surgery   Trauma Surgery    ==============================================================================  CHIEF COMPLAINT / OVERNIGHT EVENTS:   No acute overnight events.     MEDICAL HISTORY / ROS:  Admission history and ROS reviewed. Pertinent changes as follows:  None    PHYSICAL EXAM:  Heart Rate:  [72-94]   Temp:  [35.3 °C (95.5 °F)-36.6 °C (97.9 °F)]   Resp:  [9-42]   BP: (109-138)/(35-40)   SpO2:  [93 %-100 %]   Physical Exam  Constitutional:       Comments: Sedated   Eyes:      Pupils: Pupils are equal, round, and reactive to light.   Cardiovascular:      Rate and Rhythm: Normal rate and regular rhythm.   Pulmonary:      Effort: No respiratory distress.      Comments: Intubated  Abdominal:      General: There is no distension.      Palpations: Abdomen is soft.      Tenderness: There is no abdominal tenderness.   Skin:     General: Skin is warm and dry.   Neurological:      Comments: Spontaneously moves on right        IMAGING SUMMARY:    [facility] 9/7: Pending read    LABS:  Results from last 7 days   Lab Units 09/07/24  1233 09/07/24  0208 09/06/24  1807 09/06/24  1356 09/06/24  0446   WBC AUTO x10*3/uL 10.0 10.2 10.5 11.2 11.4*   HEMOGLOBIN g/dL 7.2* 7.1* 7.0* 6.6* 6.2*   HEMATOCRIT % 21.4* 22.1* 21.1* 19.3* 18.5*   PLATELETS AUTO x10*3/uL 218 212 215 236 251   NEUTROS PCT AUTO %  --   --  72.0 72.7 75.0   LYMPHO PCT MAN %  --  5.1  --   --   --    LYMPHS PCT AUTO %  --   --  10.1 9.9 8.0   MONO PCT MAN %  --  6.0  --   --   --    MONOS PCT AUTO %  --   --  8.4 8.3 7.7   EOSINO PCT MAN %  --  1.7  --   --   --    EOS PCT AUTO %  --   --  3.6 3.8 3.5     Results from last 7  days   Lab Units 09/05/24  0158 09/03/24  1821 09/01/24  2357   APTT seconds 27 27 29   INR  1.3* 1.2* 1.2*     Results from last 7 days   Lab Units 09/07/24  0208 09/06/24  1356 09/06/24  0446   SODIUM mmol/L 137 135* 135*   POTASSIUM mmol/L 4.0 3.9 4.0   CHLORIDE mmol/L 108* 105 105   CO2 mmol/L 22 23 22   BUN mg/dL 56* 55* 49*   CREATININE mg/dL 1.71* 1.81* 1.71*   CALCIUM mg/dL 7.5* 8.1* 7.9*   PROTEIN TOTAL g/dL 4.8*  --  5.0*   BILIRUBIN TOTAL mg/dL 1.2  --  1.1   ALK PHOS U/L 65  --  72   ALT U/L 36  --  30   AST U/L 52*  --  45*   GLUCOSE mg/dL 188* 198* 220*     Results from last 7 days   Lab Units 09/07/24  0208 09/06/24  0446   BILIRUBIN TOTAL mg/dL 1.2 1.1   BILIRUBIN DIRECT mg/dL 0.3 0.4*       Results from last 7 days   Lab Units 09/07/24  0209 09/06/24  0446 09/05/24  0157   POCT PH, ARTERIAL pH 7.41 7.42 7.40   POCT PCO2, ARTERIAL mm Hg 34* 35* 35*   POCT PO2, ARTERIAL mm Hg 110* 100* 103*   POCT HCO3 CALCULATED, ARTERIAL mmol/L 21.6* 22.7 21.7*   POCT BASE EXCESS, ARTERIAL mmol/L -2.8* -1.6 -2.8*       I have reviewed all medications, laboratory results, and imaging pertinent for today's encounter.

## 2024-09-08 VITALS
DIASTOLIC BLOOD PRESSURE: 13 MMHG | SYSTOLIC BLOOD PRESSURE: 110 MMHG | BODY MASS INDEX: 46.65 KG/M2 | OXYGEN SATURATION: 98 % | WEIGHT: 315 LBS | HEART RATE: 97 BPM | RESPIRATION RATE: 24 BRPM | HEIGHT: 69 IN | TEMPERATURE: 98.4 F

## 2024-09-08 LAB
ALBUMIN SERPL BCP-MCNC: 2.2 G/DL (ref 3.4–5)
ALP SERPL-CCNC: 65 U/L (ref 33–136)
ALT SERPL W P-5'-P-CCNC: 35 U/L (ref 10–52)
ANION GAP BLDA CALCULATED.4IONS-SCNC: 11 MMO/L (ref 10–25)
ANION GAP SERPL CALC-SCNC: 12 MMOL/L (ref 10–20)
AST SERPL W P-5'-P-CCNC: 45 U/L (ref 9–39)
BACTERIA BLD CULT: NORMAL
BACTERIA BLD CULT: NORMAL
BASE EXCESS BLDA CALC-SCNC: -3.9 MMOL/L (ref -2–3)
BILIRUB DIRECT SERPL-MCNC: 0.2 MG/DL (ref 0–0.3)
BILIRUB SERPL-MCNC: 0.9 MG/DL (ref 0–1.2)
BODY TEMPERATURE: 37 DEGREES CELSIUS
BUN SERPL-MCNC: 61 MG/DL (ref 6–23)
CA-I BLDA-SCNC: 1.24 MMOL/L (ref 1.1–1.33)
CALCIUM SERPL-MCNC: 7.8 MG/DL (ref 8.6–10.6)
CHLORIDE BLDA-SCNC: 109 MMOL/L (ref 98–107)
CHLORIDE SERPL-SCNC: 110 MMOL/L (ref 98–107)
CO2 SERPL-SCNC: 20 MMOL/L (ref 21–32)
CREAT SERPL-MCNC: 1.92 MG/DL (ref 0.5–1.3)
EGFRCR SERPLBLD CKD-EPI 2021: 36 ML/MIN/1.73M*2
ERYTHROCYTE [DISTWIDTH] IN BLOOD BY AUTOMATED COUNT: 18.9 % (ref 11.5–14.5)
GLUCOSE BLD MANUAL STRIP-MCNC: 180 MG/DL (ref 74–99)
GLUCOSE BLD MANUAL STRIP-MCNC: 196 MG/DL (ref 74–99)
GLUCOSE BLD MANUAL STRIP-MCNC: 198 MG/DL (ref 74–99)
GLUCOSE BLD MANUAL STRIP-MCNC: 215 MG/DL (ref 74–99)
GLUCOSE BLD MANUAL STRIP-MCNC: 223 MG/DL (ref 74–99)
GLUCOSE BLDA-MCNC: 187 MG/DL (ref 74–99)
GLUCOSE SERPL-MCNC: 185 MG/DL (ref 74–99)
HCO3 BLDA-SCNC: 20.6 MMOL/L (ref 22–26)
HCT VFR BLD AUTO: 22.8 % (ref 41–52)
HCT VFR BLD EST: 23 % (ref 41–52)
HGB BLD-MCNC: 7.2 G/DL (ref 13.5–17.5)
HGB BLDA-MCNC: 7.6 G/DL (ref 13.5–17.5)
INHALED O2 CONCENTRATION: 40 %
LACTATE BLDA-SCNC: 0.9 MMOL/L (ref 0.4–2)
MAGNESIUM SERPL-MCNC: 2.08 MG/DL (ref 1.6–2.4)
MCH RBC QN AUTO: 29.1 PG (ref 26–34)
MCHC RBC AUTO-ENTMCNC: 31.6 G/DL (ref 32–36)
MCV RBC AUTO: 92 FL (ref 80–100)
NRBC BLD-RTO: 0 /100 WBCS (ref 0–0)
OXYHGB MFR BLDA: 97.8 % (ref 94–98)
PCO2 BLDA: 34 MM HG (ref 38–42)
PH BLDA: 7.39 PH (ref 7.38–7.42)
PHOSPHATE SERPL-MCNC: 2.7 MG/DL (ref 2.5–4.9)
PLATELET # BLD AUTO: 215 X10*3/UL (ref 150–450)
PO2 BLDA: 122 MM HG (ref 85–95)
POTASSIUM BLDA-SCNC: 4 MMOL/L (ref 3.5–5.3)
POTASSIUM SERPL-SCNC: 4 MMOL/L (ref 3.5–5.3)
PROT SERPL-MCNC: 5.1 G/DL (ref 6.4–8.2)
RBC # BLD AUTO: 2.47 X10*6/UL (ref 4.5–5.9)
SAO2 % BLDA: 100 % (ref 94–100)
SODIUM BLDA-SCNC: 137 MMOL/L (ref 136–145)
SODIUM SERPL-SCNC: 138 MMOL/L (ref 136–145)
VANCOMYCIN SERPL-MCNC: 18.2 UG/ML (ref 5–20)
WBC # BLD AUTO: 9.8 X10*3/UL (ref 4.4–11.3)

## 2024-09-08 PROCEDURE — 2500000002 HC RX 250 W HCPCS SELF ADMINISTERED DRUGS (ALT 637 FOR MEDICARE OP, ALT 636 FOR OP/ED)

## 2024-09-08 PROCEDURE — 82248 BILIRUBIN DIRECT: CPT

## 2024-09-08 PROCEDURE — 82947 ASSAY GLUCOSE BLOOD QUANT: CPT

## 2024-09-08 PROCEDURE — 85027 COMPLETE CBC AUTOMATED: CPT

## 2024-09-08 PROCEDURE — 84100 ASSAY OF PHOSPHORUS: CPT

## 2024-09-08 PROCEDURE — 94003 VENT MGMT INPAT SUBQ DAY: CPT

## 2024-09-08 PROCEDURE — 2500000004 HC RX 250 GENERAL PHARMACY W/ HCPCS (ALT 636 FOR OP/ED)

## 2024-09-08 PROCEDURE — 80202 ASSAY OF VANCOMYCIN: CPT

## 2024-09-08 PROCEDURE — 2500000005 HC RX 250 GENERAL PHARMACY W/O HCPCS: Performed by: STUDENT IN AN ORGANIZED HEALTH CARE EDUCATION/TRAINING PROGRAM

## 2024-09-08 PROCEDURE — 83735 ASSAY OF MAGNESIUM: CPT

## 2024-09-08 PROCEDURE — 2020000001 HC ICU ROOM DAILY

## 2024-09-08 PROCEDURE — 2500000004 HC RX 250 GENERAL PHARMACY W/ HCPCS (ALT 636 FOR OP/ED): Performed by: PHYSICIAN ASSISTANT

## 2024-09-08 PROCEDURE — 84132 ASSAY OF SERUM POTASSIUM: CPT

## 2024-09-08 PROCEDURE — 2500000001 HC RX 250 WO HCPCS SELF ADMINISTERED DRUGS (ALT 637 FOR MEDICARE OP): Performed by: NURSE PRACTITIONER

## 2024-09-08 PROCEDURE — 2500000005 HC RX 250 GENERAL PHARMACY W/O HCPCS

## 2024-09-08 PROCEDURE — 2500000004 HC RX 250 GENERAL PHARMACY W/ HCPCS (ALT 636 FOR OP/ED): Performed by: STUDENT IN AN ORGANIZED HEALTH CARE EDUCATION/TRAINING PROGRAM

## 2024-09-08 PROCEDURE — 2500000004 HC RX 250 GENERAL PHARMACY W/ HCPCS (ALT 636 FOR OP/ED): Mod: JZ | Performed by: NURSE PRACTITIONER

## 2024-09-08 PROCEDURE — 37799 UNLISTED PX VASCULAR SURGERY: CPT

## 2024-09-08 PROCEDURE — 71045 X-RAY EXAM CHEST 1 VIEW: CPT | Performed by: RADIOLOGY

## 2024-09-08 RX ORDER — MEROPENEM 1 G/1
1 INJECTION, POWDER, FOR SOLUTION INTRAVENOUS EVERY 12 HOURS
Status: DISCONTINUED | OUTPATIENT
Start: 2024-09-08 | End: 2024-09-11

## 2024-09-08 ASSESSMENT — PAIN - FUNCTIONAL ASSESSMENT
PAIN_FUNCTIONAL_ASSESSMENT: CPOT (CRITICAL CARE PAIN OBSERVATION TOOL)

## 2024-09-08 ASSESSMENT — PAIN SCALES - GENERAL: PAINLEVEL_OUTOF10: 0 - NO PAIN

## 2024-09-08 NOTE — SIGNIFICANT EVENT
[facility] NEUROLOGY UPDATED RECOMMENDATIONS & SIGNOFF  Patient's case presented by call team. This gentleman suffered a fall on warfarin with large right-sided traumatic SDH and cerebral contusion, s/p reversal, crani, and clot evac. He has previously been on EEG for 18 hours which has shown only right hemispheric structural lesion and severe diffuse encephalopathy. MRI brain shows extra-axial hematoma running along the right cerebral hemisphere, paramedian falx, and cerebellar tentorium. There is also a significant amount of cerebral contusion. CTH appears stable with that prior advanced image.    I rounded on patient with the attending neurologist. He is laying in bed with ETT in place and has been off all sedation since Monday. He is overbreathing the vent by respiratory rate. The patient is stuporous and unable to track or follow commands. Eyes open to sternal rub. The left eye is exophoric, hypophoric, and mydriatic compared to the right. Pupils are 4->2 OD and 6->3 OS. There is limitation and decreased velocity of left eye abduction with VOR. RUE localizes to noc stim, RLE withdraws. LUE extends; LLE triple flexes. Reflexes are brisk throughout. A Babinski response is present bilaterally.    Assessment:  Travis Hunt is a 78 y.o. 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 Neurology is consulted for investigation and prognostication of persistent encephalopathy. On examination, the patient is stuporous, unable to track/follow, but clearly has intact brainstem reflexes. His exam is notable for a left CN 3 palsy, left hemiplegia, and diffuse encephalopathy. Imaging shows a stable but severe previously evacuated right SDH with underlying traumatic contusion. EEG has not shown subclinical seizure. 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 Given the persistence of the patient's poor exam and imaging, his neurologic prognosis is guarded. The probability of meaningful neurological recovery is existent but unfortunately not very likely.    Impression:  #Traumatic SDH with Cerebral Contusion & Cytotoxic Edema  #Structurally Related Encephalopathy  #Guarded Neurologic Prognosis    Recommendations:  - would pursue GOC discussion between family and primary team. If the patient's wishes would be to do everything possible to maintain quantity of life, it is likely he will be trach/PEG dependent for some time and unclear if he will ever be able to attend to others.      Thank you for letting us take part in the care of this patient. Neurology will sign off at this time. Please do not hesitate to reach out to our team by page or secure chat with any questions you may have. Patient was seen, examined, and discussed with the attending neurohospitalist Dr. Avi Saleh, who agrees w/ the assessment and plan. Please see his addendum to the neurology consult note from 9/7/24 written by Dr. Gomez for further details.    Kadeem Mi MD (PGY-4)  [facility] Inpatient Neurology Team  General Neurology Pager [phone]

## 2024-09-08 NOTE — PROGRESS NOTES
[facility]  TRAUMA SERVICE - PROGRESS NOTE    Patient Name: Travis Hunt  MRN: [id number]  Admit Date: 824  : 1945  AGE: 78 y.o.   GENDER: male  ==============================================================================  MECHANISM OF INJURY:   79 yo male came to [facility] as a transfer from [facility] after a fall backwards from a chair landing on the back of his head. + Headstrike. Unknown LOC. CTH with large SDH with midline shift and was transferred to [facility] for neurosurgical intervention and ICU admission. 3% NS at OSH. On arrival to [facility], GCS 3. S/p emergent OR for decompressive craniotomy.     LOC (yes/no?): unknown  Anticoagulant / Anti-platelet Rx? (for what dx?): coumadin/ASA, plavix  Referring Facility Name (N/A for scene EMR run): [facility]    INJURIES:   R SDH with 1.5 cm midline shift    OTHER MEDICAL PROBLEMS:  HTN  HLD  CAD  PE/DVTs (on coumadin)  Renal cell carcinoma  VAP    INCIDENTAL FINDINGS:  Prior fracture deformity of L transverse process of L5.  R posterior rib fracture of 12th rib unchanged since scan on 22.  Densities around left kidney  Ventral hernia containing bowel segment.   Solid lung nodule in Right lower lobe increased in size from previous exam on 21 and now 0.7 X 0.6 cm    PROCEDURES:  : Right craniotomy for hematoma evacuation  : Bronchoscopy    ==============================================================================  TODAY'S ASSESSMENT AND PLAN OF CARE:  79 yo M s/p fall with R SDH on coumadin/ASA/plavix s/p decompressive craniotomy. Repeat CTH with continued blossoming of intraparenchymal hematoma but stable subdural and subarachnoid collections. Last CT head 24 - stable right frontal hematoma with surrounding edema, stable SDH. Now febrile, requiring levophed. Bronchoscopy  with BAL. E faecalis from respiratory cultures    - Continue tube feeds at goal  - Respiratory cultures growing  E. Coli and group B strep and now growing E. faecalis  - Continue cefepime/flagyl - Infectious Disease following  - Ongoing goals of care. Will need trach and peg this week  - DVT ppx: lovenox 30 mg BID    Godfrey Kent MD  Attending  Trauma Surgery    ==============================================================================  CHIEF COMPLAINT / OVERNIGHT EVENTS:   No acute overnight events.     MEDICAL HISTORY / ROS:  Admission history and ROS reviewed. Pertinent changes as follows:  None    PHYSICAL EXAM:  Heart Rate:  []   Temp:  [36.2 °C (97.2 °F)-37.3 °C (99.1 °F)]   Resp:  [17-47]   BP: ()/(11-92)   Weight:  [154 kg (340 lb 6.2 oz)]   SpO2:  [94 %-100 %]   Physical Exam  Constitutional:       Comments: Sedated   Eyes:      Pupils: Pupils are equal, round, and reactive to light.   Cardiovascular:      Rate and Rhythm: Normal rate and regular rhythm.   Pulmonary:      Effort: No respiratory distress.      Comments: Intubated  Abdominal:      General: There is no distension.      Palpations: Abdomen is soft.      Tenderness: There is no abdominal tenderness.   Skin:     General: Skin is warm and dry.   Neurological:      Comments: Spontaneously moves on right        IMAGING SUMMARY:    [facility] 9/7: Pending read    LABS:  Results from last 7 days   Lab Units 09/08/24  0302 09/07/24  1233 09/07/24  0208 09/06/24  1807 09/06/24  1356 09/06/24  0446   WBC AUTO x10*3/uL 9.8 10.0 10.2 10.5 11.2 11.4*   HEMOGLOBIN g/dL 7.2* 7.2* 7.1* 7.0* 6.6* 6.2*   HEMATOCRIT % 22.8* 21.4* 22.1* 21.1* 19.3* 18.5*   PLATELETS AUTO x10*3/uL 215 218 212 215 236 251   NEUTROS PCT AUTO %  --   --   --  72.0 72.7 75.0   LYMPHO PCT MAN %  --   --  5.1  --   --   --    LYMPHS PCT AUTO %  --   --   --  10.1 9.9 8.0   MONO PCT MAN %  --   --  6.0  --   --   --    MONOS PCT AUTO %  --   --   --  8.4 8.3 7.7   EOSINO PCT MAN %  --   --  1.7  --   --   --    EOS PCT AUTO %  --   --   --  3.6 3.8 3.5     Results from last 7 days   Lab  Units 09/05/24  0158 09/03/24  1821 09/01/24  2357   APTT seconds 27 27 29   INR  1.3* 1.2* 1.2*     Results from last 7 days   Lab Units 09/08/24  0302 09/07/24  0208 09/06/24  1356 09/06/24  0446   SODIUM mmol/L 138 137 135* 135*   POTASSIUM mmol/L 4.0 4.0 3.9 4.0   CHLORIDE mmol/L 110* 108* 105 105   CO2 mmol/L 20* 22 23 22   BUN mg/dL 61* 56* 55* 49*   CREATININE mg/dL 1.92* 1.71* 1.81* 1.71*   CALCIUM mg/dL 7.8* 7.5* 8.1* 7.9*   PROTEIN TOTAL g/dL 5.1* 4.8*  --  5.0*   BILIRUBIN TOTAL mg/dL 0.9 1.2  --  1.1   ALK PHOS U/L 65 65  --  72   ALT U/L 35 36  --  30   AST U/L 45* 52*  --  45*   GLUCOSE mg/dL 185* 188* 198* 220*     Results from last 7 days   Lab Units 09/08/24  0302 09/07/24  0208 09/06/24  0446   BILIRUBIN TOTAL mg/dL 0.9 1.2 1.1   BILIRUBIN DIRECT mg/dL 0.2 0.3 0.4*       Results from last 7 days   Lab Units 09/08/24  0302 09/07/24  0209 09/06/24  0446   POCT PH, ARTERIAL pH 7.39 7.41 7.42   POCT PCO2, ARTERIAL mm Hg 34* 34* 35*   POCT PO2, ARTERIAL mm Hg 122* 110* 100*   POCT HCO3 CALCULATED, ARTERIAL mmol/L 20.6* 21.6* 22.7   POCT BASE EXCESS, ARTERIAL mmol/L -3.9* -2.8* -1.6       I have reviewed all medications, laboratory results, and imaging pertinent for today's encounter.

## 2024-09-08 NOTE — CONSULTS
"NEUROLOGY GNERAL CONSULT NOTE  Subjective   Consult Question: persistent altered mental status  History of Presenting Illness  Travis Hunt is a 78 y.o. 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    Presenting hx per trauma note:  \"[facility]  [facility] after a fall backwards from a chair landing on the back of his head. + Headstrike. Unknown LOC. NIH of 4 at , altered and combative, so he was intubated at outside hospital for airway protection. CT Head found large SDH with midline shift and was [place] [facility] [place] and [place] admission. 3% NS at OSH. On arrival to [facility], GCS 3. Sedation turned off.\"    Multiple rCTH obtained after evacuation which showed persistent R SDH extending to R tentorium, persistent midline shift (initial 1.5cm, post evacuation 0.5cm, last 9/7 1.3cm), developed significant R frontal edema thought to be contusion. There is R uncal or pending uncal herniation, stable on multiple scans.     MRI 9/5/24 is consistent with developing hemorrhage without significant diffusion restriction outside hemorrhage, MRV -ve.     EEG around 18h (9/6-7): \"right hemispheric structural lesion and a severe diffuse encephalopathy. No epileptiform discharges are seen.\"    NSGY signed off 9/7 and advised no further interventions. Recommended CTH 9/16.    Currently, pt is undergoing Efaecalis pneumonia treatment, on vancomycin, cefepime, ID following. He is also recovering from DARIUSZ (Cr 1.15 >> 1.81 > 1.71) though BUN is trending up.     Pt's wife is a nurse at [employer] and knows a neurologist whom she requested be involved in the case. She requested [facility] involvement to weigh in regarding " cause of altered mental status and somewhat to discuss prognosis. She also was under the impression that decompressive hemicraniectomy is indicated or was going to be done. At baseline, the patient is independent, has no cognitive issues, uses a walker for longer distances but ambulatory on his own at home.     ROS:  A comprehensive review-of-systems was obtained and negative unless noted above in the HPI.    Past Medical History  Past Medical History:   Diagnosis Date    Abnormal weight loss 12/22/2020    Excessive weight loss    Personal history of other venous thrombosis and embolism     History of deep venous thrombosis     Surgical History  Past Surgical History:   Procedure Laterality Date    CHOLECYSTECTOMY  06/11/2015    Cholecystectomy    HAND SURGERY  06/11/2015    Hand Surgery                                                                                                                                                          HEMORRHOID SURGERY  06/11/2015    Hemorrhoidectomy     Social History  Social History     Tobacco Use    Smoking status: Never     Passive exposure: Current (RAVINDER d/t pt condition)    Smokeless tobacco: Never    Tobacco comments:     RAVINDER d/t pt condition   Vaping Use    Vaping status: Unknown     Allergies  Iodinated contrast media, Penicillins, and Shellfish containing products      =========  Medications  Scheduled medications  atorvastatin, 40 mg, oral, Nightly  cefepime, 2 g, intravenous, q12h  enoxaparin, 40 mg, subcutaneous, q12h  insulin lispro, 0-15 Units, subcutaneous, q4h  metroNIDAZOLE, 500 mg, intravenous, q8h  oxygen, , inhalation, Continuous - Inhalation  pantoprazole, 40 mg, intravenous, Daily  perflutren lipid microspheres, 2 mL of dilution, intravenous, Once in imaging  perflutren protein A microsphere, 0.5 mL, intravenous, Once in imaging  polyethylene glycol, 17 g, oral, Daily  sennosides-docusate sodium, 1 tablet, oral, Nightly  sulfur hexafluoride microsphr, 2  "mL, intravenous, Once in imaging      Continuous medications  dexmedeTOMIDine, 0.2-1.5 mcg/kg/hr, Last Rate: Stopped (09/02/24 1740)  fentaNYL,  mcg/hr, Last Rate: Stopped (09/03/24 1130)  norepinephrine, 0-0.5 mcg/kg/min, Last Rate: Stopped (09/06/24 0530)  sodium chloride 0.9%, 50 mL/hr, Last Rate: 50 mL/hr (09/07/24 1900)      PRN medications  PRN medications: acetaminophen, alteplase, alteplase, dextrose, dextrose, glucagon, glucagon, lubricating eye drops, vancomycin    =========      Objective   Vital Signs  BP (!) 135/40   Pulse 83   Temp 36.6 °C (97.9 °F) (Temporal)   Resp 23   Ht 1.753 m (5' 9.02\")   Wt (!) 155 kg (341 lb 14.9 oz)   SpO2 94%   BMI 50.47 kg/m²     Physical Exam  GENERAL: laying in bed comfortably; well-appearing and in NAD.  NEUROLOGICAL:   Cognitive Status Exam:  Eyes open to sternal rub  Followed command to track finger (only on R)    Cranial Nerves:  II: pupils-PERRL (3 -> 2) bilaterally      VF- no blink to threat on left side (unclear if left hemifield or L eye)  III, IV, VI: eyes aligned in primary position w/o fixation instability, L lateral gaze restriction, VOR intact, no nystagmus  V: left corneal reflex impaired  VII: unable to assess  VIII: unable to assess aside from VOR  IX and X: cough and gag reflexes intact   XI: unable to assess  XII: unable to assess    Motor:   Bulk: Normal    Tone: increased in RUE, unable to reliably assess LUE due to frequent extensor posturing, unable to assess BLE due to habitus     Strength:  RUE withdrawal to pain, no effort against gravity  LUE extensor posturing to pain and on coughing  RLE withdrawal to pain, no effort against gravity  LUE triple flexion to pain    Reflexes:    R L  Biceps  3+ *  Triceps  3+ *  Brachioradialis 3+ *   * not elicited, could be due to positioning  Patellar  0 0  Achilles 0 0                                 Toes: extensor bilaterally  Powell's: absent  Ankle Clonus: absent    Sensory:   See response to " pain above    Coordination:   unable to assess    Gait:   unable to assess       Recent/Relevant Labs:  See HPI    Recent/Relevant Imaging:  See HPI    Other Recent/Relevant Studies:  Transthoracic Echo (TTE) Complete    Result Date: 8/26/2024   [facility], [address] [place] [address]                Tel [phone] and Fax [phone] TRANSTHORACIC ECHOCARDIOGRAM REPORT  Patient Name:      DONNA Shukla Physician:    [id number] Tyson Barrera MD Study Date:        8/26/2024            Ordering Provider:    [id number] NKECHI GARCES MRN/PID:           [id number]             Fellow: Accession#:        [id number]         Nurse: Date of Birth/Age: 1945 / 78 years Sonographer:          Adelso Matta RDCS, RVT Gender:            M                    Additional Staff: Height:            175.26 cm            Admit Date:           8/24/2024 Weight:            143.79 kg            Admission Status:     Inpatient - STAT BSA / BMI:         2.51 m2 / 46.81      Encounter#:           [id number]                    kg/m2 Blood Pressure:    131/38 mmHg          Department Location:  [facility] Study Type:    TRANSTHORACIC ECHO (TTE) COMPLETE Diagnosis/ICD: Nontraumatic subarachnoid hemorrhage (SAH) from basilar                artery-I60.4 Indication:    subdural hematoma Patient History: Pertinent History: CAD, PE, HLD. Study Detail: The following Echo studies were performed: 2D, M-Mode, Doppler and               color flow. Technically challenging study due to body habitus.               Definity used as a contrast agent for endocardial border               definition. Total contrast used for this procedure was 4 mL via IV               push.  PHYSICIAN INTERPRETATION: Left Ventricle: The left ventricular  systolic function is normal, with a visually estimated ejection fraction of 65-70%. There are no regional left ventricular wall motion abnormalities. The left ventricular cavity size is normal. There is no evidence of left ventricular hypertrophy. Spectral Doppler shows an impaired relaxation pattern of left ventricular diastolic filling. Left Atrium: The left atrium is normal in size. Right Ventricle: The right ventricle is upper limits of normal in size. There is normal right ventricular global systolic function. Right Atrium: The right atrium is normal in size. Aortic Valve: The aortic valve is trileaflet. There is moderate to severe aortic valve cusp calcification. There is moderate to severe aortic valve thickening. There is evidence of moderate aortic valve stenosis. The aortic valve dimensionless index is 0.28. There is trace aortic valve regurgitation. The peak instantaneous gradient of the aortic valve is 46.0 mmHg. The mean gradient of the aortic valve is 23.0 mmHg. Mitral Valve: The mitral valve is normal in structure. There is trace mitral valve regurgitation. Tricuspid Valve: The tricuspid valve is structurally normal. There is trace tricuspid regurgitation. Pulmonic Valve: The pulmonic valve is structurally normal. There is physiologic pulmonic valve regurgitation. Pericardium: There is a trivial pericardial effusion. Aorta: The aortic root is normal. Pulmonary Artery: The tricuspid regurgitant velocity is 3.08 m/s, and with an estimated right atrial pressure of 3 mmHg, the estimated pulmonary artery pressure is mildly elevated with the RVSP at 40.9 mmHg. Systemic Veins: The inferior vena cava size appears small. There is IVC inspiratory collapse greater than 50%. In comparison to the previous echocardiogram(s): There are no prior studies on this patient for comparison purposes.  CONCLUSIONS:  1. The left ventricular systolic function is normal, with a visually estimated ejection fraction of 65-70%.   2. Spectral Doppler shows an impaired relaxation pattern of left ventricular diastolic filling.  3. There is no evidence of left ventricular hypertrophy.  4. There is normal right ventricular global systolic function.  5. Moderate aortic valve stenosis.  6. There is moderate to severe aortic valve cusp calcification.  7. There is moderate to severe aortic valve thickening. QUANTITATIVE DATA SUMMARY: 2D MEASUREMENTS:                         Normal Ranges: Ao Root d:     3.40 cm  (2.0-3.7cm) LAs:           3.60 cm  (2.7-4.0cm) IVSd:          1.00 cm  (0.6-1.1cm) LVPWd:         0.90 cm  (0.6-1.1cm) LVIDd:         5.20 cm  (3.9-5.9cm) LVIDs:         3.70 cm LV Mass Index: 72 g/m2 LVEDV Index:   54 ml/m2 LV % FS        28.8 % LA VOLUME:                               Normal Ranges: LA Vol A4C:        79.1 ml    (22+/-6mL/m2) LA Vol A2C:        56.9 ml LA Vol BP:         68.4 ml LA Vol Index A4C:  31.5ml/m2 LA Vol Index A2C:  22.7 ml/m2 LA Vol Index BP:   27.2 ml/m2 LA Area A4C:       22.0 cm2 LA Area A2C:       18.3 cm2 LA Major Axis A4C: 5.2 cm LA Major Axis A2C: 5.0 cm RA VOLUME BY A/L METHOD:                       Normal Ranges: RA Area A4C: 14.6 cm2 M-MODE MEASUREMENTS:                  Normal Ranges: Ao Root: 3.40 cm (2.0-3.7cm) AORTA MEASUREMENTS:                    Normal Ranges: Asc Ao, d: 3.20 cm (2.1-3.4cm) LV SYSTOLIC FUNCTION BY 2D PLANIMETRY (MOD):                      Normal Ranges: EF-A4C View:    66 % (>=55%) EF-A2C View:    60 % EF-Biplane:     66 % EF-Visual:      68 % LV EF Reported: 68 % LV DIASTOLIC FUNCTION:                         Normal Ranges: MV Peak E:    0.69 m/s  (0.7-1.2 m/s) MV Peak A:    1.01 m/s  (0.42-0.7 m/s) E/A Ratio:    0.68      (1.0-2.2) MV e'         0.085 m/s (>8.0) MV lateral e' 0.10 m/s MV medial e'  0.07 m/s E/e' Ratio:   8.11      (<8.0) AORTIC VALVE:                                    Normal Ranges: AoV Vmax:                3.39 m/s  (<=1.7m/s) AoV Peak P.0  "mmHg (<20mmHg) AoV Mean P.0 mmHg (1.7-11.5mmHg) LVOT Max Donovan:            0.89 m/s  (<=1.1m/s) AoV VTI:                 60.20 cm  (18-25cm) LVOT VTI:                16.60 cm LVOT Diameter:           2.50 cm   (1.8-2.4cm) AoV Area, VTI:           1.35 cm2  (2.5-5.5cm2) AoV Area,Vmax:           1.29 cm2  (2.5-4.5cm2) AoV Dimensionless Index: 0.28  RIGHT VENTRICLE: RV Basal 4.40 cm RV Mid   4.00 cm RV Major 8.1 cm TAPSE:   23.5 mm RV s'    0.21 m/s TRICUSPID VALVE/RVSP:                             Normal Ranges: Peak TR Velocity: 3.08 m/s Est. RA Pressure: 3 mmHg RV Syst Pressure: 40.9 mmHg (< 30mmHg) IVC Diam:         0.80 cm PULMONIC VALVE:                         Normal Ranges: PV Accel Time: 92 msec  (>120ms) PV Max Donovan:    1.1 m/s  (0.6-0.9m/s) PV Max P.0 mmHg PV Mean P.0 mmHg PV VTI:        20.80 cm  [id number] Tyson Barrera MD Electronically signed on 2024 at 10:58:51 AM  ** Final **        =========  Assessment/Plan   Assessment:  Travis Hunt is a 78 y.o. 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 Neurology involved for recommendations regarding persistent \"poor exam\" and pt's wife request. Multiple repeat CTH showed residual SDH and developing R frontal edema thought to be due to contusion, notably adjacent to significant SDH, which extends also to R tentorium. Notably, thalami and brainstem are largely spared. On exam, there is loss of left corneal reflex, and L>R weakness although RUE hypertonicity and bilateral Babinski was seen. Bilateral exam signs are likely secondary to significant midline shift affect both corticospinal tracts. Left corneal would be secondary to underlying pontine involvement though " this cannot be ascertained on available imaging. Last CTH was early morning and was stable compared to prior. A prolonged EEG study did not reveal epileptiform activity. After discussion with NSGY, there is no considered role for further decompression at this point.     Discussed with wife pt's status. Clarified that based on imaging and exam, the patient has some neurologic functional reserve; especially thalami and brainstem are largely reserved and pt is tracking. However, cognitive, motor, visual and sensory deficits can be expected, and recovery to pt's new baseline will likely take a considerable amount of time during which he might need an artifical airway and PEG.     Current status is due to both underlying structural abnormality (large SDH, midline shift, R frontal contusion) as well as metabolic (eg, DARIUSZ) and infectious illness (eg, pneumonia).    Impression: multifactorial encephalopathy    Recommendations:  - no further neurologic workup is indicated at this time  - treatment of underlying metabolic and infectious illness     Final recommendations to follow in the morning after staffing.    [facility]  Neurology PGY-3  General Neurology team pager [phone]

## 2024-09-08 NOTE — PROGRESS NOTES
[facility]  TRAUMA ICU - PROGRESS NOTE    Patient Name: Travis Hunt  MRN: [id number]  Admit Date: 824  : 1945  AGE: 78 y.o.   GENDER: male  ==============================================================================    MECHANISM OF INJURY:     77 yo male came to [facility] as a transfer from [facility] after a fall backwards from a chair.     LOC (yes/no?): unknown  Anticoagulant / Anti-platelet Rx? (for what dx?): ASA abd Plavix  Referring Facility Name (N/A for scene EMR run): [facility]     INJURIES:   R subdural hematoma with 1.5 cm Right to left shift     OTHER MEDICAL PROBLEMS:  Prior fracture deformity of L transverse process of L5.  R posterior rib fracture of 12th rib unchanged since scan on 22.    Hx. 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   Solid lung nodule in Right lower lobe increased in size from previous exam on 21 and now 0.7 X 0.6 cm.     PROCEDURES:  : Right craniotomy for SDH evacuation      ==============================================================================  TODAY'S ASSESSMENT AND PLAN OF CARE:  Travis Hunt is a 78 y.o. 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  MRI/MRV: no CVST or obvious infection. NSGY following.   - 7 day keppra course completed   - Q4 hr neurochecks   - HOB elevated 30 degrees or more  - EEG showed no seizure activity  - CT head on  stable  -Neuro consult affirmed that no further workup warranted      # Acute pain  -as needed Tylenol     #Agitation while intubated  - on low dose fentanyl  -Precedex PRN       RESPIRATORY: #Acute hypoxic respiratory  failure secondary to traumatic brain injury  - continue ps trials as tolerated  - daily CXR and ABG as needed      CARDIOVASC:   # H/o CAD, HTN, HLD - s/p coronary stent.   - Continue home atorvastatin 40 mg nightly  - holding home plavix and coumadin (home dosing Plavix 75 mg daily and coumadin 5 mg daily 5 days a week and 2.5 mg 2 days a week)  - no home medications for HTN      #Septic Shock 2/2 pneumonia  - levo weaned off, on vaso   - BAL with bronchoscopy on 8/30     - Cefepime was started at that time due to PCN allergy     - Cultures growing E. Coli and group B strep.  - continue cefepime 8/31-9/3; increased 9/to 2g q8h for CNS coverage     GI: #History of GERD  - Discontinue famotidine 20 mg daily  - Patient has a Dobbhoff in place, receiving Pivot 1.5 at 45 cc/h.    - Bowel regimen      :   #History of CKD 3 in setting of renal cell carcinoma.    - Baseline creatinine of ~ 1.12  - Evans in place for strict I's and O's.    - daily RFP     #History of BPH  - Holding home tamsulosin at this point, will restart when appropriate next     HEMATOLOGIC: # Acute blood loss anemia, stable  - Last received PRBCs on 8/27/2024.  Continue to hold home Plavix and warfarin at this time in setting of head bleed.   - continue to monitor CBC, transfuse as appropriate   - Hgb stable today    #Delayed serologic transfusion reaction  - Type and screen identified new antibody  - Hemolysis labs ordered: LDH, Haptoglobin, Direct/ Indirect bilirubin      ENDOCRINE: No active issues  - q 4hr glucose checks  - SSI      MUSCULOSKELETAL/SKIN: #Skin tear underlying pannus on the right  - Local wound care  - Patient is on a specialty bed  - Mepilex to bony prominences.  Continue to help patient turn every 2 hours.     INFECTIOUS DISEASE: # Resolved Leukocytosis  - BAL with bronchoscopy 8/30    - Cultures growing out Escherichia coli and group B strep    - Continue cefepime (allergy to penicillins) (started 8/30), continue for total of  5-7 day course     - 9/4 UA with reflex to culture. No growth on culture.     - 9/4 Resp culture: enterococcus, gram + cocci, gram - bacilli     - 9/4 vanco added   - ID consulted today. Recommend Abx through 9/11     GI PROPHYLAXIS: N/A    DVT PROPHYLAXIS: SCDs, lovenox resumed     DISPOSITION: ICU. Ongoing GOC discussion with family.     Pt. Seen and discussed with Dr. Morfin.     Bulmaro Urias MD  PGY-1 General Surgery  [id number]    ==============================================================================  CHIEF COMPLAINT / OVERNIGHT EVENTS / HPI:   NAEON. VSS    MEDICAL HISTORY / ROS:  Admission history and ROS reviewed. Pertinent changes as follows:  Unable to obtain ROS d/t mentation/ intubation.     PHYSICAL EXAM:  Heart Rate:  [81-92]   Temp:  [36.2 °C (97.2 °F)-36.6 °C (97.9 °F)]   Resp:  [20-47]   BP: ()/(11-92)   Weight:  [154 kg (340 lb 6.2 oz)]   SpO2:  [94 %-100 %]   Physical Exam  Vitals reviewed.   Constitutional:       Appearance: He is obese.   HENT:      Right Ear: External ear normal.      Left Ear: External ear normal.      Mouth/Throat:      Mouth: Mucous membranes are dry.      Pharynx: Oropharynx is clear.   Eyes:      Pupils: Pupils are equal, round, and reactive to light.   Neck:      Comments: Right internal jugular central line  in place   Cardiovascular:      Rate and Rhythm: Normal rate.      Pulses: Normal pulses.   Pulmonary:      Comments: Intubated, on vent  Abdominal:      Comments: Obese abd, soft, OG in place with tube feed infusing at goal    Genitourinary:     Comments: Evans in place draining clear yellow urine   Musculoskeletal:      Right lower leg: Edema present.      Left lower leg: Edema present.      Comments: Generalized +2 edema    Skin:     Capillary Refill: Capillary refill takes less than 2 seconds.     He withdraws to pain on sternal rub and to noxious stimuli in all four extremities (L upper>R upper). Does not track or blink to threat. Does not follow  commands      LABS:  Results from last 7 days   Lab Units 09/08/24  0302 09/07/24  1233 09/07/24  0208 09/06/24  1807 09/06/24  1356 09/06/24  0446   WBC AUTO x10*3/uL 9.8 10.0 10.2 10.5 11.2 11.4*   HEMOGLOBIN g/dL 7.2* 7.2* 7.1* 7.0* 6.6* 6.2*   HEMATOCRIT % 22.8* 21.4* 22.1* 21.1* 19.3* 18.5*   PLATELETS AUTO x10*3/uL 215 218 212 215 236 251   NEUTROS PCT AUTO %  --   --   --  72.0 72.7 75.0   LYMPHO PCT MAN %  --   --  5.1  --   --   --    LYMPHS PCT AUTO %  --   --   --  10.1 9.9 8.0   MONO PCT MAN %  --   --  6.0  --   --   --    MONOS PCT AUTO %  --   --   --  8.4 8.3 7.7   EOSINO PCT MAN %  --   --  1.7  --   --   --    EOS PCT AUTO %  --   --   --  3.6 3.8 3.5     Results from last 7 days   Lab Units 09/05/24  0158 09/03/24  1821 09/01/24  2357   APTT seconds 27 27 29   INR  1.3* 1.2* 1.2*     Results from last 7 days   Lab Units 09/08/24  0302 09/07/24  0208 09/06/24  1356 09/06/24  0446   SODIUM mmol/L 138 137 135* 135*   POTASSIUM mmol/L 4.0 4.0 3.9 4.0   CHLORIDE mmol/L 110* 108* 105 105   CO2 mmol/L 20* 22 23 22   BUN mg/dL 61* 56* 55* 49*   CREATININE mg/dL 1.92* 1.71* 1.81* 1.71*   CALCIUM mg/dL 7.8* 7.5* 8.1* 7.9*   PROTEIN TOTAL g/dL 5.1* 4.8*  --  5.0*   BILIRUBIN TOTAL mg/dL 0.9 1.2  --  1.1   ALK PHOS U/L 65 65  --  72   ALT U/L 35 36  --  30   AST U/L 45* 52*  --  45*   GLUCOSE mg/dL 185* 188* 198* 220*     Results from last 7 days   Lab Units 09/08/24  0302 09/07/24  0208 09/06/24 0446   BILIRUBIN TOTAL mg/dL 0.9 1.2 1.1   BILIRUBIN DIRECT mg/dL 0.2 0.3 0.4*     Results from last 7 days   Lab Units 09/08/24  0302 09/07/24  0209 09/06/24 0446   POCT PH, ARTERIAL pH 7.39 7.41 7.42   POCT PCO2, ARTERIAL mm Hg 34* 34* 35*   POCT PO2, ARTERIAL mm Hg 122* 110* 100*   POCT HCO3 CALCULATED, ARTERIAL mmol/L 20.6* 21.6* 22.7   POCT BASE EXCESS, ARTERIAL mmol/L -3.9* -2.8* -1.6     I have reviewed all medications, laboratory results, and imaging pertinent for today's encounter..      TODAY'S EVENTS  HD  # 15 for this 77 YO male transferred from OSH after a   Fall backwards from a chair with head strike. Intubated at OSH transferred into a chair. + Head strike.  Had R subdural hematoma with 1.5 cm Right to left shift, old (Prior) fracture deformity of L transverse process of L5. R posterior rib fracture of 12th rib. 8/24 DE compressive craniotomy Now with minimal responsiveness. For my exam GCS poor non responsive sporadic eye opening, withdraws family hopeful. Preexisting sacral decubitus poor pre injury performance status limited ambulation with walker. Family has agreed to Trach PEG is discussing care with OSH neurologist which appears to be clouding the picture with overestimate of recovery potential Discussed with wife and daughter      This critically ill patient continues  to be at-risk for clinically significant deterioration / failure due to the above mentioned dysfunctional, unstable organ systems.  I have personally identified and managed all complex critical care issues to prevent aforementioned clinical deterioration.  Critical care time is spent at bedside and/or the immediate area and has included, but is not limited to, the review of diagnostic tests, labs, radiographs, serial assessments of hemodynamics, respiratory status, ventilatory management, and family updates.  Time spent in procedures and teaching are reported separately.     CRITICAL CARE TIME:  35 minutes

## 2024-09-08 NOTE — PROGRESS NOTES
Vancomycin Dosing by Pharmacy- FOLLOW UP    Travis Hunt is a 78 y.o. year old male who Pharmacy has been consulted for vancomycin dosing for CNS/meningoencephalitis. Based on the patient's indication and renal status this patient is being dosed based on a goal trough/random level of 15-20.     Renal function is currently declining.    Current vancomycin dose: dose by level -- last dose was 1.5 gm given on  at 0900    Most recent trough level: 18 mcg/mL (18 hour level)    Visit Vitals  BP (!) 64/11   Pulse 87   Temp 36.4 °C (97.5 °F) (Temporal)   Resp (!) 35        Lab Results   Component Value Date    CREATININE 1.92 (H) 2024    CREATININE 1.71 (H) 2024    CREATININE 1.81 (H) 2024    CREATININE 1.71 (H) 2024        Patient weight is as follows:   Vitals:    24 0430   Weight: (!) 154 kg (340 lb 6.2 oz)       Cultures:  Susceptibility data for the encounter in last 14 days.  Collected Specimen Info Organism Ampicillin Cefazolin Ciprofloxacin Gentamicin Gentamicin Synergy Levofloxacin Penicillin Piperacillin/Tazobactam Trimethoprim/Sulfamethoxazole Vancomycin   24 Fluid from BAL Enterococcus faecalis  S     R   S   R  S     Mixed Microorganisms Resembling Upper Respiratory Isis                Mixed Anaerobic Bacteria             24 Fluid from BAL Escherichia coli  S  S  S  S   S   S  S      Streptococcus agalactiae (Group B Streptococcus)             24 Fluid from BAL Escherichia coli  S  S  S  S   S   S  S      Streptococcus agalactiae (Group B Streptococcus)                  I/O last 3 completed shifts:  In: 4750 (30.8 mL/kg) [I.V.:1800 (11.7 mL/kg); NG/GT:1750; IV Piggyback:1200]  Out: 2460 (15.9 mL/kg) [Urine:2460 (0.4 mL/kg/hr)]  Weight: 154.4 kg   I/O during current shift:  I/O this shift:  In: 95 [I.V.:50; NG/GT:45]  Out: 50 [Urine:50]    Temp (24hrs), Av.4 °C (97.6 °F), Min:36.2 °C (97.2 °F), Max:36.6 °C (97.9 °F)      Assessment/Plan    Within goal  random/trough level >> redose 1.5 gm x1  The next level will be obtained on 9/9 at 0800. May be obtained sooner if clinically indicated.   Will continue to monitor renal function daily while on vancomycin and order serum creatinine at least every 48 hours if not already ordered.  Follow for continued vancomycin needs, clinical response, and signs/symptoms of toxicity.       Iliana Damon, PharmD

## 2024-09-09 LAB
ALBUMIN SERPL BCP-MCNC: 2.2 G/DL (ref 3.4–5)
ALP SERPL-CCNC: 69 U/L (ref 33–136)
ALT SERPL W P-5'-P-CCNC: 31 U/L (ref 10–52)
ANION GAP BLDA CALCULATED.4IONS-SCNC: 12 MMO/L (ref 10–25)
ANION GAP SERPL CALC-SCNC: 10 MMOL/L (ref 10–20)
AST SERPL W P-5'-P-CCNC: 30 U/L (ref 9–39)
BASE EXCESS BLDA CALC-SCNC: -6 MMOL/L (ref -2–3)
BB ANTIBODY IDENTIFICATION: NORMAL
BILIRUB DIRECT SERPL-MCNC: 0.2 MG/DL (ref 0–0.3)
BILIRUB SERPL-MCNC: 0.9 MG/DL (ref 0–1.2)
BODY TEMPERATURE: 37 DEGREES CELSIUS
BUN SERPL-MCNC: 64 MG/DL (ref 6–23)
CA-I BLDA-SCNC: 1.27 MMOL/L (ref 1.1–1.33)
CALCIUM SERPL-MCNC: 8.1 MG/DL (ref 8.6–10.6)
CASE #: NORMAL
CHLORIDE BLDA-SCNC: 112 MMOL/L (ref 98–107)
CHLORIDE SERPL-SCNC: 111 MMOL/L (ref 98–107)
CHLORIDE UR-SCNC: 55 MMOL/L
CHLORIDE/CREATININE (MMOL/G) IN URINE: 64 MMOL/G CREAT (ref 23–275)
CO2 SERPL-SCNC: 21 MMOL/L (ref 21–32)
CREAT SERPL-MCNC: 2.27 MG/DL (ref 0.5–1.3)
CREAT UR-MCNC: 85.8 MG/DL (ref 20–370)
EGFRCR SERPLBLD CKD-EPI 2021: 29 ML/MIN/1.73M*2
ERYTHROCYTE [DISTWIDTH] IN BLOOD BY AUTOMATED COUNT: 18.6 % (ref 11.5–14.5)
GLUCOSE BLD MANUAL STRIP-MCNC: 189 MG/DL (ref 74–99)
GLUCOSE BLD MANUAL STRIP-MCNC: 211 MG/DL (ref 74–99)
GLUCOSE BLD MANUAL STRIP-MCNC: 217 MG/DL (ref 74–99)
GLUCOSE BLD MANUAL STRIP-MCNC: 219 MG/DL (ref 74–99)
GLUCOSE BLD MANUAL STRIP-MCNC: 220 MG/DL (ref 74–99)
GLUCOSE BLD MANUAL STRIP-MCNC: 222 MG/DL (ref 74–99)
GLUCOSE BLD MANUAL STRIP-MCNC: 231 MG/DL (ref 74–99)
GLUCOSE BLDA-MCNC: 241 MG/DL (ref 74–99)
GLUCOSE SERPL-MCNC: 225 MG/DL (ref 74–99)
HCO3 BLDA-SCNC: 18.2 MMOL/L (ref 22–26)
HCT VFR BLD AUTO: 23.7 % (ref 41–52)
HCT VFR BLD EST: 27 % (ref 41–52)
HGB BLD-MCNC: 7.7 G/DL (ref 13.5–17.5)
HGB BLDA-MCNC: 8.9 G/DL (ref 13.5–17.5)
INHALED O2 CONCENTRATION: 40 %
LACTATE BLDA-SCNC: 1.1 MMOL/L (ref 0.4–2)
MAGNESIUM SERPL-MCNC: 2.02 MG/DL (ref 1.6–2.4)
MCH RBC QN AUTO: 28.6 PG (ref 26–34)
MCHC RBC AUTO-ENTMCNC: 32.5 G/DL (ref 32–36)
MCV RBC AUTO: 88 FL (ref 80–100)
NRBC BLD-RTO: 0 /100 WBCS (ref 0–0)
OXYHGB MFR BLDA: 96.9 % (ref 94–98)
PCO2 BLDA: 30 MM HG (ref 38–42)
PH BLDA: 7.39 PH (ref 7.38–7.42)
PHOSPHATE SERPL-MCNC: 3.2 MG/DL (ref 2.5–4.9)
PLATELET # BLD AUTO: 217 X10*3/UL (ref 150–450)
PO2 BLDA: 140 MM HG (ref 85–95)
POTASSIUM BLDA-SCNC: 4.4 MMOL/L (ref 3.5–5.3)
POTASSIUM SERPL-SCNC: 4.3 MMOL/L (ref 3.5–5.3)
POTASSIUM UR-SCNC: 29 MMOL/L
POTASSIUM/CREAT UR-RTO: 34 MMOL/G CREAT
PROT SERPL-MCNC: 5.2 G/DL (ref 6.4–8.2)
RBC # BLD AUTO: 2.69 X10*6/UL (ref 4.5–5.9)
SAO2 % BLDA: 99 % (ref 94–100)
SODIUM BLDA-SCNC: 138 MMOL/L (ref 136–145)
SODIUM SERPL-SCNC: 138 MMOL/L (ref 136–145)
SODIUM UR-SCNC: 57 MMOL/L
SODIUM/CREAT UR-RTO: 66 MMOL/G CREAT
VANCOMYCIN TROUGH SERPL-MCNC: 28.5 UG/ML (ref 5–20)
WBC # BLD AUTO: 12 X10*3/UL (ref 4.4–11.3)

## 2024-09-09 PROCEDURE — 2500000004 HC RX 250 GENERAL PHARMACY W/ HCPCS (ALT 636 FOR OP/ED): Performed by: PHYSICIAN ASSISTANT

## 2024-09-09 PROCEDURE — 2500000004 HC RX 250 GENERAL PHARMACY W/ HCPCS (ALT 636 FOR OP/ED): Performed by: NURSE PRACTITIONER

## 2024-09-09 PROCEDURE — 2500000004 HC RX 250 GENERAL PHARMACY W/ HCPCS (ALT 636 FOR OP/ED)

## 2024-09-09 PROCEDURE — 82436 ASSAY OF URINE CHLORIDE: CPT | Performed by: STUDENT IN AN ORGANIZED HEALTH CARE EDUCATION/TRAINING PROGRAM

## 2024-09-09 PROCEDURE — 36620 INSERTION CATHETER ARTERY: CPT | Mod: GC

## 2024-09-09 PROCEDURE — 84132 ASSAY OF SERUM POTASSIUM: CPT

## 2024-09-09 PROCEDURE — 2500000005 HC RX 250 GENERAL PHARMACY W/O HCPCS: Performed by: STUDENT IN AN ORGANIZED HEALTH CARE EDUCATION/TRAINING PROGRAM

## 2024-09-09 PROCEDURE — 82248 BILIRUBIN DIRECT: CPT

## 2024-09-09 PROCEDURE — 2500000002 HC RX 250 W HCPCS SELF ADMINISTERED DRUGS (ALT 637 FOR MEDICARE OP, ALT 636 FOR OP/ED)

## 2024-09-09 PROCEDURE — 85027 COMPLETE CBC AUTOMATED: CPT

## 2024-09-09 PROCEDURE — 2500000004 HC RX 250 GENERAL PHARMACY W/ HCPCS (ALT 636 FOR OP/ED): Performed by: STUDENT IN AN ORGANIZED HEALTH CARE EDUCATION/TRAINING PROGRAM

## 2024-09-09 PROCEDURE — 80048 BASIC METABOLIC PNL TOTAL CA: CPT

## 2024-09-09 PROCEDURE — 94003 VENT MGMT INPAT SUBQ DAY: CPT

## 2024-09-09 PROCEDURE — 80202 ASSAY OF VANCOMYCIN: CPT

## 2024-09-09 PROCEDURE — 37799 UNLISTED PX VASCULAR SURGERY: CPT

## 2024-09-09 PROCEDURE — 82947 ASSAY GLUCOSE BLOOD QUANT: CPT

## 2024-09-09 PROCEDURE — 2020000001 HC ICU ROOM DAILY

## 2024-09-09 PROCEDURE — 83735 ASSAY OF MAGNESIUM: CPT

## 2024-09-09 PROCEDURE — 71045 X-RAY EXAM CHEST 1 VIEW: CPT | Performed by: RADIOLOGY

## 2024-09-09 PROCEDURE — 2500000001 HC RX 250 WO HCPCS SELF ADMINISTERED DRUGS (ALT 637 FOR MEDICARE OP): Performed by: NURSE PRACTITIONER

## 2024-09-09 PROCEDURE — 2500000002 HC RX 250 W HCPCS SELF ADMINISTERED DRUGS (ALT 637 FOR MEDICARE OP, ALT 636 FOR OP/ED): Performed by: STUDENT IN AN ORGANIZED HEALTH CARE EDUCATION/TRAINING PROGRAM

## 2024-09-09 PROCEDURE — 84100 ASSAY OF PHOSPHORUS: CPT

## 2024-09-09 PROCEDURE — 70450 CT HEAD/BRAIN W/O DYE: CPT | Performed by: RADIOLOGY

## 2024-09-09 RX ORDER — INSULIN GLARGINE 100 [IU]/ML
15 INJECTION, SOLUTION SUBCUTANEOUS EVERY 24 HOURS
Status: DISCONTINUED | OUTPATIENT
Start: 2024-09-09 | End: 2024-09-10

## 2024-09-09 ASSESSMENT — PAIN - FUNCTIONAL ASSESSMENT
PAIN_FUNCTIONAL_ASSESSMENT: CPOT (CRITICAL CARE PAIN OBSERVATION TOOL)

## 2024-09-09 ASSESSMENT — PAIN SCALES - GENERAL
PAINLEVEL_OUTOF10: 0 - NO PAIN
PAINLEVEL_OUTOF10: 0 - NO PAIN

## 2024-09-09 NOTE — PROGRESS NOTES
Nutrition Note:     Spoke with pt's wife at her request. She expressed c/f elevated glucose and current TF formula. Pt has been on Isosource 1.5 @ 45ml/hr x 10 days. On 9/4 he was recommended Pivot by RDN.     RDN explained to the pt's wife that increased glucose can be r/t the body's inflammatory response with illness. She requested a higher protein and lower carbohydrate formula.     Discussed option for Glucerna. Wife agreeable in switching.     Pt with 3+ edema on admission with admission weight of 144kg. Energy needs assessed with both admission weight and IBW d/t unknown dry weight.     Recommendations:    Switch to Glucerna 1.5 @ 45ml/hr + 3pkt Prostat daily   FWF per MD/team discretion     TF + prostat = 1920kcal, 143gm protein, and 821ml free water

## 2024-09-09 NOTE — PROGRESS NOTES
[facility]  TRAUMA ICU - PROGRESS NOTE    Patient Name: Travis Hunt  MRN: [id number]  Admit Date: 824  : 1945  AGE: 78 y.o.   GENDER: male  ==============================================================================    MECHANISM OF INJURY:     79 yo male came to [facility] as a transfer from [facility] after a fall backwards from a chair.     LOC (yes/no?): unknown  Anticoagulant / Anti-platelet Rx? (for what dx?): ASA abd Plavix  Referring Facility Name (N/A for scene EMR run): [facility]     INJURIES:   R subdural hematoma with 1.5 cm Right to left shift     OTHER MEDICAL PROBLEMS:  Prior fracture deformity of L transverse process of L5.  R posterior rib fracture of 12th rib unchanged since scan on 22.    Hx. 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   Solid lung nodule in Right lower lobe increased in size from previous exam on 21 and now 0.7 X 0.6 cm.     PROCEDURES:  : Right craniotomy for SDH evacuation      ==============================================================================  TODAY'S ASSESSMENT AND PLAN OF CARE:  Travis Hunt is a 78 y.o. 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  MRI/MRV: no CVST or obvious infection. NSGY following.   - 7 day keppra course completed   - Q4 hr neurochecks   - HOB elevated 30 degrees or more  - EEG showed no seizure activity  - CT head on  stable  -Repeat head CT     # Acute pain  -as needed Tylenol      RESPIRATORY: #Acute hypoxic respiratory failure secondary to traumatic brain injury  - continue ps trials as tolerated  - daily CXR and ABG as needed       CARDIOVASC:   # H/o CAD, HTN, HLD - s/p coronary stent.   - Continue home atorvastatin 40 mg nightly  - holding home plavix and coumadin (home dosing Plavix 75 mg daily and coumadin 5 mg daily 5 days a week and 2.5 mg 2 days a week)  - no home medications for HTN      #Septic Shock 2/2 pneumonia  - levo weaned off, on vaso   - BAL with bronchoscopy on 8/30     - Cefepime was started at that time due to PCN allergy     - Cultures growing E. Coli and group B strep.  - continue cefepime 8/31-9/3; increased 9/to 2g q8h for CNS coverage     GI: #History of GERD  - Patient has a Dobbhoff in place, transition to Glucerna 1.5 at 45 cc/h.    - Bowel regimen      :   #History of CKD 3 in setting of renal cell carcinoma.    - Baseline creatinine of ~ 1.12  - Evans in place for strict I's and O's.    - daily RFP    #DARIUSZ  -Urine lytes      #History of BPH  - Holding home tamsulosin at this point, will restart when appropriate next     HEMATOLOGIC: # Acute blood loss anemia, stable  - Last received PRBCs on 8/27/2024.  Continue to hold home Plavix and warfarin at this time in setting of head bleed.   - continue to monitor CBC, transfuse as appropriate   - Hgb stable today    #Delayed serologic transfusion reaction  - Type and screen identified new antibody  - Hemolysis labs ordered: LDH, Haptoglobin, Direct/ Indirect bilirubin      ENDOCRINE: No active issues  - q 4hr glucose checks  - SSI   -Lantus 15 PM     MUSCULOSKELETAL/SKIN: #Skin tear underlying pannus on the right  - Local wound care  - Patient is on a specialty bed  - Mepilex to bony prominences.  Continue to help patient turn every 2 hours.     INFECTIOUS DISEASE: # Resolved Leukocytosis  - BAL with bronchoscopy 8/30    - Cultures growing out Escherichia coli and group B strep    - Continue cefepime (allergy to penicillins) (started 8/30), continue for total of 5-7 day course     - 9/4 UA with reflex to culture. No growth on culture.     - 9/4 Resp culture:  enterococcus, gram + cocci, gram - bacilli     - 9/4 vanco added   - ID consulted today. Recommend Abx through 9/11     GI PROPHYLAXIS: N/A    DVT PROPHYLAXIS: SCDs, lovenox resumed     DISPOSITION: ICU. Ongoing GOC discussion with family.     Pt. Seen and discussed with Dr. Graham.     Bulmaro Urias MD  PGY-1 General Surgery  [phone]    ==============================================================================  CHIEF COMPLAINT / OVERNIGHT EVENTS / HPI:   NAEON. VSS    MEDICAL HISTORY / ROS:  Admission history and ROS reviewed. Pertinent changes as follows:  Unable to obtain ROS d/t mentation/ intubation.     PHYSICAL EXAM:  Heart Rate:  []   Temp:  [36.5 °C (97.7 °F)-37.3 °C (99.1 °F)]   Resp:  [17-52]   BP: ()/(30-96)   SpO2:  [93 %-100 %]   Physical Exam  Vitals reviewed.   Constitutional:       Appearance: He is obese.   HENT:      Right Ear: External ear normal.      Left Ear: External ear normal.      Mouth/Throat:      Mouth: Mucous membranes are dry.      Pharynx: Oropharynx is clear.   Eyes:      Pupils: Pupils are equal, round, and reactive to light.   Neck:      Comments: Right internal jugular central line  in place   Cardiovascular:      Rate and Rhythm: Normal rate.      Pulses: Normal pulses.   Pulmonary:      Comments: Intubated, on vent  Abdominal:      Comments: Obese abd, soft, OG in place with tube feed infusing at goal    Genitourinary:     Comments: Evans in place draining clear yellow urine   Musculoskeletal:      Right lower leg: Edema present.      Left lower leg: Edema present.      Comments: Generalized +2 edema    Skin:     Capillary Refill: Capillary refill takes less than 2 seconds.     He withdraws to pain on sternal rub and to noxious stimuli in all four extremities (L upper>R upper). Does not track or blink to threat. Does not follow commands      LABS:  Results from last 7 days   Lab Units 09/09/24  0133 09/08/24  0302 09/07/24  1233 09/07/24  0208 09/06/24  1808  09/06/24  1356 09/06/24  0446   WBC AUTO x10*3/uL 12.0* 9.8 10.0 10.2 10.5 11.2 11.4*   HEMOGLOBIN g/dL 7.7* 7.2* 7.2* 7.1* 7.0* 6.6* 6.2*   HEMATOCRIT % 23.7* 22.8* 21.4* 22.1* 21.1* 19.3* 18.5*   PLATELETS AUTO x10*3/uL 217 215 218 212 215 236 251   NEUTROS PCT AUTO %  --   --   --   --  72.0 72.7 75.0   LYMPHO PCT MAN %  --   --   --  5.1  --   --   --    LYMPHS PCT AUTO %  --   --   --   --  10.1 9.9 8.0   MONO PCT MAN %  --   --   --  6.0  --   --   --    MONOS PCT AUTO %  --   --   --   --  8.4 8.3 7.7   EOSINO PCT MAN %  --   --   --  1.7  --   --   --    EOS PCT AUTO %  --   --   --   --  3.6 3.8 3.5     Results from last 7 days   Lab Units 09/05/24  0158 09/03/24  1821   APTT seconds 27 27   INR  1.3* 1.2*     Results from last 7 days   Lab Units 09/09/24 0133 09/08/24 0302 09/07/24  0208   SODIUM mmol/L 138 138 137   POTASSIUM mmol/L 4.3 4.0 4.0   CHLORIDE mmol/L 111* 110* 108*   CO2 mmol/L 21 20* 22   BUN mg/dL 64* 61* 56*   CREATININE mg/dL 2.27* 1.92* 1.71*   CALCIUM mg/dL 8.1* 7.8* 7.5*   PROTEIN TOTAL g/dL 5.2* 5.1* 4.8*   BILIRUBIN TOTAL mg/dL 0.9 0.9 1.2   ALK PHOS U/L 69 65 65   ALT U/L 31 35 36   AST U/L 30 45* 52*   GLUCOSE mg/dL 225* 185* 188*     Results from last 7 days   Lab Units 09/09/24 0133 09/08/24  0302 09/07/24  0208   BILIRUBIN TOTAL mg/dL 0.9 0.9 1.2   BILIRUBIN DIRECT mg/dL 0.2 0.2 0.3     Results from last 7 days   Lab Units 09/09/24  0356 09/08/24  0302 09/07/24  0209   POCT PH, ARTERIAL pH 7.39 7.39 7.41   POCT PCO2, ARTERIAL mm Hg 30* 34* 34*   POCT PO2, ARTERIAL mm Hg 140* 122* 110*   POCT HCO3 CALCULATED, ARTERIAL mmol/L 18.2* 20.6* 21.6*   POCT BASE EXCESS, ARTERIAL mmol/L -6.0* -3.9* -2.8*

## 2024-09-09 NOTE — PROGRESS NOTES
Spiritual Care Visit    Clinical Encounter Type  Visited With: Patient  Routine Visit: Follow-up  Continue Visiting: Yes  Crisis Visit: Critical care    Scientologist Encounters  Scientologist Needs: Prayer         Sacramental Encounters  Other Sacrament: P&B S    Patient was unresponsive, received a prayer and blessing (P&B S) by Fr. Osmel Armstrong,  Denominational .    Patient had received the Sacrament of the Anointing of the Sick on 8/28/24.

## 2024-09-09 NOTE — PROGRESS NOTES
ICU Attending Daily Progress Note    Travis Hunt is a 78 y.o. male, admitted 8/24/2024 with SDH following fall with headstrike.     Neuro: R SDH with 1.5 cm midline shift. GCS 3T, triple flexion in BLE. Multimodal pain control with Tylenol. No sedation. Will repeat CTH given severity of edema in setting of needing to decide on trach/peg.   Pulm: on vent, no spontaneous respirations.    CV: Levo to maintain MAP >65. Not fluid responsive. Possible septic shock (pneumonia) vs vasoplegia. Home atorvastatin.   GI/Nutrition: Tube feeds at goal via dobhoff. Change to Glucerna today given hyperglycemia. Appreciate Nutrition recs.  Renal: DARIUSZ on CKD, no indication for dialysis. Making adequate urine output. Calculate FeNa. Maintain renal perfusion and avoid nephrotoxic meds.   Skin/Musculoskeletal: frequent repositioning to prevent DTI worsening, known stage 1 wound present on admission  Endocrine: ISS, got 30 units insulin in past 24 hrs, goal maintain euglycemia. Will add 15 unit Lantus tonight  Heme: anemia of critical illness, stable, no indication for transfusion  Infectious: Enterococcus in sputum- meropenem to end 9/11 (concern for cefepime induced neurotoxicity).   PPx: Lovenox. Home Coumadin (DVTs) being held in setting of TBI  Code status: Full Code. Will need trach/peg pending family discussion following repeat CTH. Conversation today with wife and two daughters reinforced grim prognosis however they are conflicted about not giving him a chance to recover.     Case discussed with primary team (Trauma).    I personally spent a total of 35 minutes during this patient encounter, with over 50% of the time devoted to counseling the patient (or family) and coordination of care. This time does not include any time spent on bedside procedures.  Please see resident/fellow/KARTHIKEYAN note for further details.

## 2024-09-09 NOTE — PROGRESS NOTES
[facility]  TRAUMA SERVICE - PROGRESS NOTE    Patient Name: Travis Hunt  MRN: [id number]  Admit Date: 824  : 1945  AGE: 78 y.o.   GENDER: male  ==============================================================================  MECHANISM OF INJURY:   77 yo male came to [facility] as a transfer from [facility] after a fall backwards from a chair landing on the back of his head. + Headstrike. Unknown LOC. CTH with large SDH with midline shift and [place] [facility] [place]. 3% NS at OSH. On arrival to [facility] campus, GCS 3. S/p emergent OR for decompressive craniotomy.     LOC (yes/no?): unknown  Anticoagulant / Anti-platelet Rx? 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  R posterior rib fracture of 12th rib unchanged since scan on 22.  Densities around left kidney  Ventral hernia containing bowel segment.   Solid lung nodule in Right lower lobe increased in size from previous exam on 21 and now 0.7 X 0.6 cm    PROCEDURES:  : Right craniotomy for hematoma evacuation  : Bronchoscopy    ==============================================================================  TODAY'S ASSESSMENT AND PLAN OF CARE:  77 yo M s/p fall with R SDH on coumadin/ASA/plavix s/p decompressive craniotomy. Repeat CTH with continued blossoming of intraparenchymal hematoma but stable subdural and subarachnoid collections. Last CT head 24 - stable right frontal hematoma with surrounding edema, stable SDH. Now febrile, requiring levophed. Bronchoscopy  with BAL. E faecalis from respiratory cultures    - Continue tube feeds at goal  - Respiratory cultures growing  E. Coli and group B strep and now growing E. faecalis  - Continue Meropenem until 9/11 - Infectious Disease following  - Ongoing goals of care. Will need trach and peg this week. Family has not made decision as of today.  - DVT ppx: lovenox 30 mg BID    Carina Herndon MD  Trauma Surgery    ==============================================================================  CHIEF COMPLAINT / OVERNIGHT EVENTS:   No acute overnight events.     MEDICAL HISTORY / ROS:  Admission history and ROS reviewed. Pertinent changes as follows:  None    PHYSICAL EXAM:  Heart Rate:  []   Temp:  [36.5 °C (97.7 °F)-37.3 °C (99.1 °F)]   Resp:  [17-52]   BP: ()/(30-96)   SpO2:  [93 %-100 %]   Physical Exam  Constitutional:       Comments: Sedated   Eyes:      Pupils: Pupils are equal, round, and reactive to light.   Cardiovascular:      Rate and Rhythm: Normal rate and regular rhythm.   Pulmonary:      Effort: No respiratory distress.      Comments: Intubated  Abdominal:      General: There is no distension.      Palpations: Abdomen is soft.      Tenderness: There is no abdominal tenderness.   Skin:     General: Skin is warm and dry.   Neurological:      Comments: Spontaneously moves on right        IMAGING SUMMARY:    Chest x-ray 9/9: ETT in place. Central venous catheter in place. Stable.    LABS:  Results from last 7 days   Lab Units 09/09/24  0133 09/08/24  0302 09/07/24  1233 09/07/24  0208 09/06/24  1807 09/06/24  1356 09/06/24  0446   WBC AUTO x10*3/uL 12.0* 9.8 10.0 10.2 10.5 11.2 11.4*   HEMOGLOBIN g/dL 7.7* 7.2* 7.2* 7.1* 7.0* 6.6* 6.2*   HEMATOCRIT % 23.7* 22.8* 21.4* 22.1* 21.1* 19.3* 18.5*   PLATELETS AUTO x10*3/uL 217 215 218 212 215 236 251   NEUTROS PCT AUTO %  --   --   --   --  72.0 72.7 75.0   LYMPHO PCT MAN %  --   --   --  5.1  --   --   --    LYMPHS PCT AUTO %  --   --   --   --  10.1 9.9 8.0   MONO PCT MAN %  --   --   --  6.0  --   --   --    MONOS PCT AUTO %  --   --   --   --  8.4 8.3 7.7   EOSINO PCT MAN %   --   --   --  1.7  --   --   --    EOS PCT AUTO %  --   --   --   --  3.6 3.8 3.5     Results from last 7 days   Lab Units 09/05/24  0158 09/03/24  1821   APTT seconds 27 27   INR  1.3* 1.2*     Results from last 7 days   Lab Units 09/09/24  0133 09/08/24  0302 09/07/24  0208   SODIUM mmol/L 138 138 137   POTASSIUM mmol/L 4.3 4.0 4.0   CHLORIDE mmol/L 111* 110* 108*   CO2 mmol/L 21 20* 22   BUN mg/dL 64* 61* 56*   CREATININE mg/dL 2.27* 1.92* 1.71*   CALCIUM mg/dL 8.1* 7.8* 7.5*   PROTEIN TOTAL g/dL 5.2* 5.1* 4.8*   BILIRUBIN TOTAL mg/dL 0.9 0.9 1.2   ALK PHOS U/L 69 65 65   ALT U/L 31 35 36   AST U/L 30 45* 52*   GLUCOSE mg/dL 225* 185* 188*     Results from last 7 days   Lab Units 09/09/24  0133 09/08/24  0302 09/07/24  0208   BILIRUBIN TOTAL mg/dL 0.9 0.9 1.2   BILIRUBIN DIRECT mg/dL 0.2 0.2 0.3       Results from last 7 days   Lab Units 09/09/24  0356 09/08/24  0302 09/07/24  0209   POCT PH, ARTERIAL pH 7.39 7.39 7.41   POCT PCO2, ARTERIAL mm Hg 30* 34* 34*   POCT PO2, ARTERIAL mm Hg 140* 122* 110*   POCT HCO3 CALCULATED, ARTERIAL mmol/L 18.2* 20.6* 21.6*   POCT BASE EXCESS, ARTERIAL mmol/L -6.0* -3.9* -2.8*       I have reviewed all medications, laboratory results, and imaging pertinent for today's encounter.

## 2024-09-09 NOTE — PROGRESS NOTES
Vancomycin Dosing by Pharmacy  FOLLOW UP    Travis Hunt is a 78 y.o. male who Pharmacy is consulted to dose vancomycin for CNS/meningoencephalitis.     Based on the patient's indication and renal status, this patient is being dosed based on a goal vancomycin level of 15-20.     Current vancomycin dose: Dosing by levels due to DARIUSZ    Most recent vancomycin level: 28.5 mcg/mL (24 hour level)    Renal function is currently declining.    Estimated Creatinine Clearance: 40.7 mL/min (A) (by C-G formula based on SCr of 2.27 mg/dL (H)).    Results from last 7 days   Lab Units 09/09/24  0133 09/08/24  0302 09/07/24  1233 09/07/24  0208 09/06/24  1807 09/06/24  1356   CREATININE mg/dL 2.27* 1.92*  --  1.71*  --  1.81*   BUN mg/dL 64* 61*  --  56*  --  55*   WBC AUTO x10*3/uL 12.0* 9.8 10.0 10.2   < > 11.2    < > = values in this interval not displayed.        Visit Vitals  BP (!) 142/38   Pulse 91   Temp 36.5 °C (97.7 °F)   Resp 17       Gram Stain   Date/Time Value Ref Range Status   09/04/2024 01:21 AM (2+) Few Polymorphonuclear leukocytes (A)  Final   09/04/2024 01:21 AM (2+) Few Gram negative bacilli (A)  Final   09/04/2024 01:21 AM (1+) Rare Gram positive cocci (A)  Final     Urine Culture   Date/Time Value Ref Range Status   09/04/2024 03:01 PM No growth  Final     Blood Culture   Date/Time Value Ref Range Status   09/04/2024 01:13 AM No growth at 4 days -  FINAL REPORT  Final   09/04/2024 01:13 AM No growth at 4 days -  FINAL REPORT  Final        Susceptibility data from last 90 days.  Collected Specimen Info Organism Ampicillin Cefazolin Ciprofloxacin Gentamicin Gentamicin Synergy Levofloxacin Penicillin Piperacillin/Tazobactam Trimethoprim/Sulfamethoxazole Vancomycin   09/04/24 Fluid from BAL Enterococcus faecalis  S     R   S   R  S     Mixed Microorganisms Resembling Upper Respiratory Isis                Mixed Anaerobic Bacteria             08/30/24 Fluid from BAL Escherichia coli  S  S  S  S   S   S  S       Streptococcus agalactiae (Group B Streptococcus)             08/30/24 Fluid from BAL Escherichia coli  S  S  S  S   S   S  S      Streptococcus agalactiae (Group B Streptococcus)                 Assessment/Plan    Vancomycin level is above goal range of 15-20. Will hold vancomycin.    The next vancomycin level will be ordered for 9/10 at Am labs, unless clinically indicated sooner.  Will continue to monitor renal function daily while on vancomycin and order serum creatinine at least every 48 hours if not already ordered.  Will follow for continued vancomycin needs, clinical response, and signs/symptoms of toxicity.       Yony Garcia, PharmD

## 2024-09-09 NOTE — PROGRESS NOTES
Physical Therapy                 Therapy Communication Note    Patient Name: Travis Hunt  MRN: [id number]  Today's Date: 9/9/2024     Discipline: Physical Therapy    Missed Visit Reason: Missed Visit Reason:  (0844: Pt pending GOC discussion, will defer PT tx.)    Missed Time: Attempt    Dayanara Carr, PT

## 2024-09-09 NOTE — CARE PLAN
Problem: Skin  Goal: Decreased wound size/increased tissue granulation at next dressing change  Outcome: Progressing  Flowsheets (Taken 9/9/2024 0414)  Decreased wound size/increased tissue granulation at next dressing change:   Promote sleep for wound healing   Protective dressings over bony prominences  Goal: Participates in plan/prevention/treatment measures  Outcome: Progressing  Flowsheets (Taken 9/9/2024 0414)  Participates in plan/prevention/treatment measures: Elevate heels  Goal: Prevent/manage excess moisture  Outcome: Progressing  Flowsheets (Taken 9/9/2024 0414)  Prevent/manage excess moisture:   Follow provider orders for dressing changes   Moisturize dry skin  Goal: Prevent/minimize sheer/friction injuries  Outcome: Progressing  Flowsheets (Taken 9/9/2024 0414)  Prevent/minimize sheer/friction injuries:   Complete micro-shifts as needed if patient unable. Adjust patient position to relieve pressure points, not a full turn   HOB 30 degrees or less   Turn/reposition every 2 hours/use positioning/transfer devices  Goal: Promote/optimize nutrition  Outcome: Progressing  Flowsheets (Taken 9/9/2024 0414)  Promote/optimize nutrition: Monitor/record intake including meals  Goal: Promote skin healing  Outcome: Progressing  Flowsheets (Taken 9/9/2024 0414)  Promote skin healing:   Turn/reposition every 2 hours/use positioning/transfer devices   Ensure correct size (line/device) and apply per  instructions   Assess skin/pad under line(s)/device(s)     Problem: Fall/Injury  Goal: Not fall by end of shift  Outcome: Progressing  Goal: Be free from injury by end of the shift  Outcome: Progressing  Goal: Verbalize understanding of personal risk factors for fall in the hospital  Outcome: Progressing  Goal: Verbalize understanding of risk factor reduction measures to prevent injury from fall in the home  Outcome: Progressing  Goal: Use assistive devices by end of the shift  Outcome: Progressing  Goal: Pace  activities to prevent fatigue by end of the shift  Outcome: Progressing     Problem: Pain  Goal: Takes deep breaths with improved pain control throughout the shift  Outcome: Progressing  Goal: Turns in bed with improved pain control throughout the shift  Outcome: Progressing  Goal: Walks with improved pain control throughout the shift  Outcome: Progressing  Goal: Performs ADL's with improved pain control throughout shift  Outcome: Progressing  Goal: Participates in PT with improved pain control throughout the shift  Outcome: Progressing  Goal: Free from opioid side effects throughout the shift  Outcome: Progressing  Goal: Free from acute confusion related to pain meds throughout the shift  Outcome: Progressing     Problem: Pain - Adult  Goal: Verbalizes/displays adequate comfort level or baseline comfort level  Outcome: Progressing     Problem: Safety - Adult  Goal: Free from fall injury  Outcome: Progressing     Problem: Discharge Planning  Goal: Discharge to home or other facility with appropriate resources  Outcome: Progressing     Problem: Chronic Conditions and Co-morbidities  Goal: Patient's chronic conditions and co-morbidity symptoms are monitored and maintained or improved  Outcome: Progressing     Problem: Knowledge Deficit  Goal: Patient/family/caregiver demonstrates understanding of disease process, treatment plan, medications, and discharge instructions  Outcome: Progressing     Problem: Mechanical Ventilation  Goal: Patient Will Maintain Patent Airway  Outcome: Progressing  Goal: Oral health is maintained or improved  Outcome: Progressing  Goal: Tracheostomy will be managed safely  Outcome: Progressing  Goal: ET tube will be managed safely  Outcome: Progressing  Goal: Ability to express needs and understand communication  Outcome: Progressing  Goal: Mobility/activity is maintained at optimum level for patient  Outcome: Progressing   The patient's goals for the shift include      The clinical goals for  the shift include Patient will remain HDS throughout shift.

## 2024-09-09 NOTE — PROGRESS NOTES
Occupational Therapy                 Therapy Communication Note    Patient Name: Travis Hunt  MRN: [id number]  Today's Date: 9/9/2024     Discipline: Occupational Therapy    Missed Visit Reason: Missed Visit Reason: Patient placed on medical hold (854 Per rounds pending GOC discussion, will hold OT)    Missed Time: Attempt    Comment:

## 2024-09-10 LAB
ABO GROUP (TYPE) IN BLOOD: NORMAL
ALBUMIN SERPL BCP-MCNC: 2.1 G/DL (ref 3.4–5)
ALP SERPL-CCNC: 69 U/L (ref 33–136)
ALT SERPL W P-5'-P-CCNC: 27 U/L (ref 10–52)
ANION GAP BLDA CALCULATED.4IONS-SCNC: 9 MMO/L (ref 10–25)
ANION GAP SERPL CALC-SCNC: 9 MMOL/L (ref 10–20)
ANTIBODY SCREEN: NORMAL
AST SERPL W P-5'-P-CCNC: 29 U/L (ref 9–39)
BASE EXCESS BLDA CALC-SCNC: -5.3 MMOL/L (ref -2–3)
BB ANTIBODY IDENTIFICATION: NORMAL
BILIRUB DIRECT SERPL-MCNC: 0.3 MG/DL (ref 0–0.3)
BILIRUB SERPL-MCNC: 0.7 MG/DL (ref 0–1.2)
BODY TEMPERATURE: 37 DEGREES CELSIUS
BUN SERPL-MCNC: 73 MG/DL (ref 6–23)
CA-I BLDA-SCNC: 1.34 MMOL/L (ref 1.1–1.33)
CALCIUM SERPL-MCNC: 8.2 MG/DL (ref 8.6–10.6)
CASE #: NORMAL
CHLORIDE BLDA-SCNC: 113 MMOL/L (ref 98–107)
CHLORIDE SERPL-SCNC: 114 MMOL/L (ref 98–107)
CO2 SERPL-SCNC: 21 MMOL/L (ref 21–32)
CREAT SERPL-MCNC: 2.9 MG/DL (ref 0.5–1.3)
DAT-POLYSPECIFIC: NORMAL
EGFRCR SERPLBLD CKD-EPI 2021: 22 ML/MIN/1.73M*2
ERYTHROCYTE [DISTWIDTH] IN BLOOD BY AUTOMATED COUNT: 18.7 % (ref 11.5–14.5)
GLUCOSE BLD MANUAL STRIP-MCNC: 178 MG/DL (ref 74–99)
GLUCOSE BLD MANUAL STRIP-MCNC: 189 MG/DL (ref 74–99)
GLUCOSE BLD MANUAL STRIP-MCNC: 192 MG/DL (ref 74–99)
GLUCOSE BLD MANUAL STRIP-MCNC: 204 MG/DL (ref 74–99)
GLUCOSE BLDA-MCNC: 232 MG/DL (ref 74–99)
GLUCOSE SERPL-MCNC: 221 MG/DL (ref 74–99)
HCO3 BLDA-SCNC: 20 MMOL/L (ref 22–26)
HCT VFR BLD AUTO: 22.7 % (ref 41–52)
HCT VFR BLD EST: 23 % (ref 41–52)
HGB BLD-MCNC: 7.4 G/DL (ref 13.5–17.5)
HGB BLDA-MCNC: 7.7 G/DL (ref 13.5–17.5)
INHALED O2 CONCENTRATION: 40 %
LACTATE BLDA-SCNC: 1.2 MMOL/L (ref 0.4–2)
MAGNESIUM SERPL-MCNC: 1.98 MG/DL (ref 1.6–2.4)
MCH RBC QN AUTO: 28.8 PG (ref 26–34)
MCHC RBC AUTO-ENTMCNC: 32.6 G/DL (ref 32–36)
MCV RBC AUTO: 88 FL (ref 80–100)
NRBC BLD-RTO: 0 /100 WBCS (ref 0–0)
OXYHGB MFR BLDA: 96.5 % (ref 94–98)
PCO2 BLDA: 37 MM HG (ref 38–42)
PH BLDA: 7.34 PH (ref 7.38–7.42)
PHOSPHATE SERPL-MCNC: 4 MG/DL (ref 2.5–4.9)
PLATELET # BLD AUTO: 240 X10*3/UL (ref 150–450)
PO2 BLDA: 149 MM HG (ref 85–95)
POTASSIUM BLDA-SCNC: 5.2 MMOL/L (ref 3.5–5.3)
POTASSIUM SERPL-SCNC: 4.9 MMOL/L (ref 3.5–5.3)
PROT SERPL-MCNC: 5 G/DL (ref 6.4–8.2)
RBC # BLD AUTO: 2.57 X10*6/UL (ref 4.5–5.9)
RH FACTOR (ANTIGEN D): NORMAL
SAO2 % BLDA: 99 % (ref 94–100)
SODIUM BLDA-SCNC: 137 MMOL/L (ref 136–145)
SODIUM SERPL-SCNC: 139 MMOL/L (ref 136–145)
VANCOMYCIN SERPL-MCNC: 18.7 UG/ML (ref 5–20)
WBC # BLD AUTO: 12.5 X10*3/UL (ref 4.4–11.3)

## 2024-09-10 PROCEDURE — 2500000004 HC RX 250 GENERAL PHARMACY W/ HCPCS (ALT 636 FOR OP/ED): Performed by: NURSE PRACTITIONER

## 2024-09-10 PROCEDURE — 2500000002 HC RX 250 W HCPCS SELF ADMINISTERED DRUGS (ALT 637 FOR MEDICARE OP, ALT 636 FOR OP/ED): Performed by: STUDENT IN AN ORGANIZED HEALTH CARE EDUCATION/TRAINING PROGRAM

## 2024-09-10 PROCEDURE — 85027 COMPLETE CBC AUTOMATED: CPT

## 2024-09-10 PROCEDURE — 2500000005 HC RX 250 GENERAL PHARMACY W/O HCPCS: Performed by: STUDENT IN AN ORGANIZED HEALTH CARE EDUCATION/TRAINING PROGRAM

## 2024-09-10 PROCEDURE — 2020000001 HC ICU ROOM DAILY

## 2024-09-10 PROCEDURE — 84100 ASSAY OF PHOSPHORUS: CPT

## 2024-09-10 PROCEDURE — 37799 UNLISTED PX VASCULAR SURGERY: CPT

## 2024-09-10 PROCEDURE — 71045 X-RAY EXAM CHEST 1 VIEW: CPT | Performed by: RADIOLOGY

## 2024-09-10 PROCEDURE — 2500000004 HC RX 250 GENERAL PHARMACY W/ HCPCS (ALT 636 FOR OP/ED): Performed by: PHYSICIAN ASSISTANT

## 2024-09-10 PROCEDURE — 83735 ASSAY OF MAGNESIUM: CPT

## 2024-09-10 PROCEDURE — 86922 COMPATIBILITY TEST ANTIGLOB: CPT

## 2024-09-10 PROCEDURE — 86870 RBC ANTIBODY IDENTIFICATION: CPT

## 2024-09-10 PROCEDURE — 86901 BLOOD TYPING SEROLOGIC RH(D): CPT | Performed by: PHYSICIAN ASSISTANT

## 2024-09-10 PROCEDURE — 84132 ASSAY OF SERUM POTASSIUM: CPT

## 2024-09-10 PROCEDURE — 2500000002 HC RX 250 W HCPCS SELF ADMINISTERED DRUGS (ALT 637 FOR MEDICARE OP, ALT 636 FOR OP/ED)

## 2024-09-10 PROCEDURE — 2500000004 HC RX 250 GENERAL PHARMACY W/ HCPCS (ALT 636 FOR OP/ED): Performed by: STUDENT IN AN ORGANIZED HEALTH CARE EDUCATION/TRAINING PROGRAM

## 2024-09-10 PROCEDURE — 2500000004 HC RX 250 GENERAL PHARMACY W/ HCPCS (ALT 636 FOR OP/ED)

## 2024-09-10 PROCEDURE — 86077 PHYS BLOOD BANK SERV XMATCH: CPT | Performed by: PHYSICIAN ASSISTANT

## 2024-09-10 PROCEDURE — 37799 UNLISTED PX VASCULAR SURGERY: CPT | Performed by: PHYSICIAN ASSISTANT

## 2024-09-10 PROCEDURE — 99222 1ST HOSP IP/OBS MODERATE 55: CPT

## 2024-09-10 PROCEDURE — 94003 VENT MGMT INPAT SUBQ DAY: CPT

## 2024-09-10 PROCEDURE — 82947 ASSAY GLUCOSE BLOOD QUANT: CPT

## 2024-09-10 PROCEDURE — 2500000001 HC RX 250 WO HCPCS SELF ADMINISTERED DRUGS (ALT 637 FOR MEDICARE OP): Performed by: NURSE PRACTITIONER

## 2024-09-10 PROCEDURE — 80202 ASSAY OF VANCOMYCIN: CPT

## 2024-09-10 PROCEDURE — 86880 COOMBS TEST DIRECT: CPT

## 2024-09-10 PROCEDURE — 82248 BILIRUBIN DIRECT: CPT

## 2024-09-10 RX ORDER — MAGNESIUM SULFATE HEPTAHYDRATE 40 MG/ML
2 INJECTION, SOLUTION INTRAVENOUS ONCE
Status: COMPLETED | OUTPATIENT
Start: 2024-09-10 | End: 2024-09-10

## 2024-09-10 RX ORDER — INSULIN GLARGINE 100 [IU]/ML
20 INJECTION, SOLUTION SUBCUTANEOUS EVERY 24 HOURS
Status: DISCONTINUED | OUTPATIENT
Start: 2024-09-10 | End: 2024-09-11

## 2024-09-10 RX ORDER — HEPARIN SODIUM 5000 [USP'U]/ML
5000 INJECTION, SOLUTION INTRAVENOUS; SUBCUTANEOUS EVERY 8 HOURS
Status: DISCONTINUED | OUTPATIENT
Start: 2024-09-10 | End: 2024-09-10

## 2024-09-10 RX ORDER — VANCOMYCIN HYDROCHLORIDE 500 MG/100ML
500 INJECTION, SOLUTION INTRAVENOUS ONCE
Status: COMPLETED | OUTPATIENT
Start: 2024-09-10 | End: 2024-09-10

## 2024-09-10 RX ORDER — INSULIN LISPRO 100 [IU]/ML
0-20 INJECTION, SOLUTION INTRAVENOUS; SUBCUTANEOUS
Status: DISCONTINUED | OUTPATIENT
Start: 2024-09-10 | End: 2024-09-11

## 2024-09-10 RX ORDER — HEPARIN SODIUM 5000 [USP'U]/ML
7500 INJECTION, SOLUTION INTRAVENOUS; SUBCUTANEOUS EVERY 8 HOURS
Status: DISCONTINUED | OUTPATIENT
Start: 2024-09-11 | End: 2024-09-11

## 2024-09-10 ASSESSMENT — PAIN - FUNCTIONAL ASSESSMENT
PAIN_FUNCTIONAL_ASSESSMENT: CPOT (CRITICAL CARE PAIN OBSERVATION TOOL)

## 2024-09-10 ASSESSMENT — PAIN SCALES - GENERAL: PAINLEVEL_OUTOF10: 0 - NO PAIN

## 2024-09-10 NOTE — CONSULTS
.NEPHROLOGY NEW CONSULT NOTE   Travis Hunt   78 y.o.    @WT@  MRN/Room: [id number]/19/19-A    Reason for consult: DARIUSZ on CKD, elevated Cr and BUN    HPI:  Travis Hunt is a 78 y.o. male with a past medical hx of HTN, HLD, CAD, previous PE/DVTs (on coumadin), renal cell carcinoma s/p nephrectomy who presented as a fall backward from a chair with positive headstrike who was diagnosed with a large subdural hematoma with midline shift. He was taken to the OR for right craniotomy and subdural hematoma evacuation on 8/24. Patient required intubation, sedation and pressors, and had acute hypoxic respiratory failure. Patient with several complications during admission including ventilator associated pneumonia, septic shock requiring vasopressors, encephalopathy secondary to trauma.     He has previously been on EEG for 18 hours which has shown only right hemispheric structural lesion and severe diffuse encephalopathy. MRI brain shows extra-axial hematoma running along the right cerebral hemisphere, paramedian falx, and cerebellar tentorium. There is also a significant amount of cerebral contusion. Hematoma appears to be stable.     Nephrology consulted for DARIUSZ on CKD, with the concern that BUN is causing encephalopathy and contributing to current neurological status.     Patient did not open eyes or establish eye contact during conversation, no opening eyes at loud voice. Part of history obtained from wife at bedside. She states that patient has had stable renal function being followed up by nephrologist due to right nephrectomy.     Baseline creatinine is 1.5 (8/24/2024) eGFR 45 CKD 3a/3b  Creatinine at the time of admission was 1.59 and started trending up from 9/6/2024 and now up to 2.9.  Blood pressure remained stable with need of norepinephrine  Urine output was 0.2 ml/kg/hr last 24hrs  Recent contrast studies No  Recent nephrotoxic medication use vancomycin, PPI   Recent ACE/ARB/diuretic use: No     fever, skin rash or  "eosinophilia on CBC: No  Recent nausea, vomiting, loose stools, evidence of acute blood loss, joint pains, URI/viral infection: no  Lightheadedness, dizziness, dry mouth  Recent changes in urine output:decreased  frequency/foamy urine, dysuria, Incomplete bladder emptying, dark/bloody urine, new back pain - No reported  BPH history - No  Swelling in leg, weight gain/loss, SOB/orthopnea/PND - No  Prior history of NSAID use - No  Prior history of renal stones - No   Surgeries/procedures during hospitalization with drop in Bp/reivew of anesthesia event  Herbal remedies No, PPI Yes, started on   Smoking  and drug use HX No   Hx of vascular disease (CVA, MI, TIA, PVD)  Hx of cancers Renal cell carcinoma, s/p nephrectomy     In The ER: BP (!) 89/49   Pulse 100   Temp 36.7 °C (98.1 °F) (Temporal)   Resp 19   Ht 1.753 m (5' 9.02\")   Wt (!) 160 kg (353 lb 2.8 oz)   SpO2 99%   BMI 52.13 kg/m²      Past Medical History:   Diagnosis Date    Abnormal weight loss 12/22/2020    Excessive weight loss    Personal history of other venous thrombosis and embolism     History of deep venous thrombosis      Past Surgical History:   Procedure Laterality Date    CHOLECYSTECTOMY  06/11/2015    Cholecystectomy    HAND SURGERY  06/11/2015    Hand Surgery                                                                                                                                                          HEMORRHOID SURGERY  06/11/2015    Hemorrhoidectomy      No family history on file.  Social History     Socioeconomic History    Marital status:      Spouse name: Not on file    Number of children: Not on file    Years of education: Not on file    Highest education level: Not on file   Occupational History    Not on file   Tobacco Use    Smoking status: Never     Passive exposure: Current (RAVINDER d/t pt condition)    Smokeless tobacco: Never    Tobacco comments:     RAVINDER d/t pt condition   Vaping Use    Vaping status: Unknown "   Substance and Sexual Activity    Alcohol use: Not on file    Drug use: Not on file    Sexual activity: Not on file   Other Topics Concern    Not on file   Social History Narrative    Not on file     Social Determinants of Health     Financial Resource Strain: Patient Unable To Answer (8/25/2024)    Overall Financial Resource Strain (CARDIA)     Difficulty of Paying Living Expenses: Patient unable to answer   Food Insecurity: Patient Unable To Answer (8/25/2024)    Hunger Vital Sign     Worried About Running Out of Food in the Last Year: Patient unable to answer     Ran Out of Food in the Last Year: Patient unable to answer   Transportation Needs: Patient Unable To Answer (8/25/2024)    PRAPARE - Transportation     Lack of Transportation (Medical): Patient unable to answer     Lack of Transportation (Non-Medical): Patient unable to answer   Physical Activity: Patient Unable To Answer (8/25/2024)    Exercise Vital Sign     Days of Exercise per Week: Patient unable to answer     Minutes of Exercise per Session: Patient unable to answer   Stress: Patient Unable To Answer (8/25/2024)    [employer] - Occupational Stress Questionnaire     Feeling of Stress : Patient unable to answer   Social Connections: Patient Unable To Answer (8/25/2024)    Social Connection and Isolation Panel [NHANES]     Frequency of Communication with Friends and Family: Patient unable to answer     Frequency of Social Gatherings with Friends and Family: Patient unable to answer     Attends Gnosticist Services: Patient unable to answer     Active Member of Clubs or Organizations: Patient unable to answer     Attends Club or Organization Meetings: Patient unable to answer     Marital Status: Patient unable to answer   Intimate Partner Violence: Patient Unable To Answer (8/25/2024)    Humiliation, Afraid, Rape, and Kick questionnaire     Fear of Current or Ex-Partner: Patient unable to answer     Emotionally Abused:  Patient unable to answer     Physically Abused: Patient unable to answer     Sexually Abused: Patient unable to answer   Housing Stability: Patient Unable To Answer (8/25/2024)    Housing Stability Vital Sign     Unable to Pay for Housing in the Last Year: Patient unable to answer     Number of Times Moved in the Last Year: 1     Homeless in the Last Year: Patient unable to answer       Allergies   Allergen Reactions    Iodinated Contrast Media Hives    Penicillins Unknown    Shellfish Containing Products Hives        Medications Prior to Admission   Medication Sig Dispense Refill Last Dose    allopurinol (Zyloprim) 100 mg tablet TAKE 1 TABLET TWICE A  tablet 3 8/23/2024    ammonium lactate (Lac-Hydrin) 12 % lotion Apply 1 Application topically once daily as needed.   Past Week    atorvastatin (Lipitor) 40 mg tablet Take 1 tablet (40 mg) by mouth once daily at bedtime.   8/23/2024    clopidogrel (Plavix) 75 mg tablet Take 1 tablet (75 mg) by mouth once daily in the evening.   8/23/2024    clotrimazole-betamethasone (Lotrisone) lotion Apply topically every 2 hours if needed (apply every 2 hours as needed if itching persists). 150 mL 3 Past Week    famotidine (Pepcid) 20 mg tablet Take 1 tablet (20 mg) by mouth once daily in the evening.   8/23/2024    fexofenadine (Allegra) 180 mg tablet Take 1 tablet (180 mg) by mouth once daily in the evening.   8/23/2024    lidocaine (Lidoderm) 5 % patch PLACE 1 PATCH OVER 12 HOURS ON THE SKIN ONCE DAILY. APPLY TO PAINFUL AREA 12 HOURS PER DAY, REMOVE FOR 12 HOURS. (Patient taking differently: Place 1 patch on the skin once daily as needed. Apply to painful area 12 hours per day, remove for 12 hours.) 30 patch 3 Past Week    meclizine (Antivert) 25 mg tablet TAKE 1 TABLET BY MOUTH THREE TIMES A DAY AS NEEDED (Patient taking differently: Take 1 tablet (25 mg) by mouth once daily at bedtime.) 90 tablet 3 8/23/2024    metaxalone (Skelaxin) 800 mg tablet TAKE 1 TABLET DAILY  (Patient taking differently: Take 1 tablet (800 mg) by mouth once daily as needed.) 90 tablet 3 Past Week    oxyCODONE-acetaminophen (Percocet) 7.5-325 mg tablet Take 1 tablet by mouth every 8 hours if needed.   Past Week    tamsulosin (Flomax) 0.4 mg 24 hr capsule Take 1 capsule (0.4 mg) by mouth once daily in the evening.   8/23/2024    warfarin (Coumadin) 5 mg tablet TAKE 1 TABLET (5 MG) FIVE DAYS A WEEK AND TAKE ONE-HALF (1/2) TABLET (2.5 MG) TWO DAYS A WEEK 90 tablet 3 8/23/2024    ciclopirox (Penlac) 8 % solution APPLY TO AFFECTED NAILS ONCE DAILY. REMOVE ONCE WEEKLY WITH ALCOHOL   Unknown    nitroglycerin (Nitrostat) 0.4 mg SL tablet TAKE 1 TABS SUBLINGUAL EVERY 5 MINUTES AS NEEDED FOR CHEST PAIN   Unknown    omeprazole (PriLOSEC) 20 mg DR capsule TAKE 1 CAPSULE DAILY (Patient not taking: Reported on 8/24/2024) 90 capsule 3 Unknown        Meds:   atorvastatin, 40 mg, Nightly  [START ON 9/11/2024] heparin, 7,500 Units, q8h  insulin glargine, 20 Units, q24h  insulin lispro, 0-20 Units, TID  meropenem, 1 g, q12h  oxygen, , Continuous - Inhalation  pantoprazole, 40 mg, Daily  perflutren lipid microspheres, 2 mL of dilution, Once in imaging  perflutren protein A microsphere, 0.5 mL, Once in imaging  polyethylene glycol, 17 g, Daily  sennosides-docusate sodium, 1 tablet, Nightly  sulfur hexafluoride microsphr, 2 mL, Once in imaging      norepinephrine, Last Rate: 0.06 mcg/kg/min (09/10/24 1300)  sodium chloride 0.9%, Last Rate: 50 mL/hr (09/10/24 0700)      acetaminophen, 650 mg, q4h PRN  alteplase, 1 mg, PRN  alteplase, 2 mg, PRN  dextrose, 12.5 g, q15 min PRN  dextrose, 25 g, q15 min PRN  glucagon, 1 mg, q15 min PRN  glucagon, 1 mg, q15 min PRN  lubricating eye drops, 1 drop, PRN  vancomycin, , Daily PRN      Vitals:    09/10/24 1215   BP:    Pulse: 100   Resp: 19   Temp:    SpO2: 99%        09/08 1900 - 09/10 0659  In: 4339.3 [I.V.:1679.3]  Out: 1505 [Urine:1505]   Weight change:      General appearance: AAOx3.  No distress  Eyes: non-icteric  Skin: no apparent rash  Heart: cardiac sounds regular. No rub  Lungs: CTA bilat.  No wheezing/crackles  Abdomen: soft, nt/nd  Extremities: No edema bilat  : 0.2 ml/kg/hr Evans  Neuro: No response to voice stimulus   ACCESS: central line      Blood Labs:  Results for orders placed or performed during the hospital encounter of 08/24/24 (from the past 24 hour(s))   Urine electrolytes   Result Value Ref Range    Sodium, Urine Random 57 mmol/L    Sodium/Creatinine Ratio 66 Not established. mmol/g Creat    Potassium, Urine Random 29 mmol/L    Potassium/Creatinine Ratio 34 Not established mmol/g Creat    Chloride, Urine Random 55 mmol/L    Chloride/Creatinine Ratio 64 23 - 275 mmol/g creat    Creatinine, Urine Random 85.8 20.0 - 370.0 mg/dL   POCT GLUCOSE   Result Value Ref Range    POCT Glucose 211 (H) 74 - 99 mg/dL   POCT GLUCOSE   Result Value Ref Range    POCT Glucose 189 (H) 74 - 99 mg/dL   POCT GLUCOSE   Result Value Ref Range    POCT Glucose 220 (H) 74 - 99 mg/dL   Blood Gas Arterial Full Panel   Result Value Ref Range    POCT pH, Arterial 7.34 (L) 7.38 - 7.42 pH    POCT pCO2, Arterial 37 (L) 38 - 42 mm Hg    POCT pO2, Arterial 149 (H) 85 - 95 mm Hg    POCT SO2, Arterial 99 94 - 100 %    POCT Oxy Hemoglobin, Arterial 96.5 94.0 - 98.0 %    POCT Hematocrit Calculated, Arterial 23.0 (L) 41.0 - 52.0 %    POCT Sodium, Arterial 137 136 - 145 mmol/L    POCT Potassium, Arterial 5.2 3.5 - 5.3 mmol/L    POCT Chloride, Arterial 113 (H) 98 - 107 mmol/L    POCT Ionized Calcium, Arterial 1.34 (H) 1.10 - 1.33 mmol/L    POCT Glucose, Arterial 232 (H) 74 - 99 mg/dL    POCT Lactate, Arterial 1.2 0.4 - 2.0 mmol/L    POCT Base Excess, Arterial -5.3 (L) -2.0 - 3.0 mmol/L    POCT HCO3 Calculated, Arterial 20.0 (L) 22.0 - 26.0 mmol/L    POCT Hemoglobin, Arterial 7.7 (L) 13.5 - 17.5 g/dL    POCT Anion Gap, Arterial 9 (L) 10 - 25 mmo/L    Patient Temperature 37.0 degrees Celsius    FiO2 40 %   CBC   Result  Value Ref Range    WBC 12.5 (H) 4.4 - 11.3 x10*3/uL    nRBC 0.0 0.0 - 0.0 /100 WBCs    RBC 2.57 (L) 4.50 - 5.90 x10*6/uL    Hemoglobin 7.4 (L) 13.5 - 17.5 g/dL    Hematocrit 22.7 (L) 41.0 - 52.0 %    MCV 88 80 - 100 fL    MCH 28.8 26.0 - 34.0 pg    MCHC 32.6 32.0 - 36.0 g/dL    RDW 18.7 (H) 11.5 - 14.5 %    Platelets 240 150 - 450 x10*3/uL   Hepatic Function Panel   Result Value Ref Range    Albumin 2.1 (L) 3.4 - 5.0 g/dL    Bilirubin, Total 0.7 0.0 - 1.2 mg/dL    Bilirubin, Direct 0.3 0.0 - 0.3 mg/dL    Alkaline Phosphatase 69 33 - 136 U/L    ALT 27 10 - 52 U/L    AST 29 9 - 39 U/L    Total Protein 5.0 (L) 6.4 - 8.2 g/dL   Magnesium   Result Value Ref Range    Magnesium 1.98 1.60 - 2.40 mg/dL   Phosphorus   Result Value Ref Range    Phosphorus 4.0 2.5 - 4.9 mg/dL   Type And Screen   Result Value Ref Range    ABO TYPE A     Rh TYPE POS     ANTIBODY SCREEN POS    Basic Metabolic Panel   Result Value Ref Range    Glucose 221 (H) 74 - 99 mg/dL    Sodium 139 136 - 145 mmol/L    Potassium 4.9 3.5 - 5.3 mmol/L    Chloride 114 (H) 98 - 107 mmol/L    Bicarbonate 21 21 - 32 mmol/L    Anion Gap 9 (L) 10 - 20 mmol/L    Urea Nitrogen 73 (H) 6 - 23 mg/dL    Creatinine 2.90 (H) 0.50 - 1.30 mg/dL    eGFR 22 (L) >60 mL/min/1.73m*2    Calcium 8.2 (L) 8.6 - 10.6 mg/dL   BB ORDER ONLY - Antibody Identification   Result Value Ref Range    Antibody ID Anti-E     CASE # [id number]    Direct Antiglobulin Test   Result Value Ref Range    ALEXA-POLYSPECIFIC NEG    POCT GLUCOSE   Result Value Ref Range    POCT Glucose 204 (H) 74 - 99 mg/dL   Vancomycin   Result Value Ref Range    Vancomycin 18.7 5.0 - 20.0 ug/mL   POCT GLUCOSE   Result Value Ref Range    POCT Glucose 189 (H) 74 - 99 mg/dL   POCT GLUCOSE   Result Value Ref Range    POCT Glucose 192 (H) 74 - 99 mg/dL         ASSESSMENT:  Travis Hunt is a  78 y.o.  Year old , with PMHx of Travis Hunt is a 78 y.o. male with a past medical hx of HTN, HLD, CAD, previous PE/DVTs (on coumadin), renal  cell carcinoma s/p nephrectomy who presented as a fall backward from a chair with positive headstrike who was diagnosed with a large subdural hematoma with midline shift. He was taken to the OR for right craniotomy and subdural hematoma evacuation on 8/24. Patient required intubation, sedation and pressors, and had acute hypoxic respiratory failure. Patient with several complications during admission including ventilator associated pneumonia, shock requiring vasopressors, encephalopathy.    Nephrology consulted for DARIUSZ on CKD, with the concern that azotemia is contributing to current neurological status and to  inquire potential benefit of early dialysis. The cause of current of DARIUSZ is unclear, it could be multifactorial since patient's risk factors: CVD, one kidney, shock, anemia, current medications. The current mental status has remained very similar than what described since azotemia started, and it could be also multifactorial, especially secondary to cerebral edema. We do not consider that currently there is an indication to early dialysis at this time due to risk of complications associated with it. We will watch and follow his azotemia along other indicators and evaluate if dialysis is required. We suggest to check and adjust if needed medications such as Meropenem, vancomycin, try to keep Hb>8, avoid PPI if possible, check renal US and CBC with differential.     Kidney   - DARIUSZ on CKD, oliguric, Stage 2  - Baseline creatinine: 1.5  - Etiology: unclear  - UA showed:    - Urine electrolyes showed FeNA/FeUrea of 1-4  - Clinical volume status: Euvolemic  - Electrolytes (Na, K, Ca, Phos) stable  - Acid base status: stable    Hemodynamics  - Blood pressures remained stable requiring pressor support during hospital course.     Avoid nephrotoxins  - Contrast, hyperglycemia, NSAIDs, ischemia (anemia)    Medications (causative)   - Example - Zosyn and vancomycin, NSAIDs, ACE/ARBs, etc.     Medications  (adjustments)   - Medication adjustments based off current GFR: Meropenem/Vancomycin     Obstruction   -  Check Renal US    Recommendations:  - Check CBC with diff, Renal US   - Indication for dialysis:  No   - Avoid nephrotoxins, contrast if possible  - strict Is/Os  - Renal dosing for medications for latest eGFR, follow medication trough as appropriate (adjust Meropenem/Vancomycin if needed)   - Avoid hypotension/rapid fluctuations in Bps  - Avoid PPI if possible   - Consider pRBC transfusion, try to keep Hb>8 mg/dL  - Close Glycemic control    - Team will follow     Patient seen and discussed with attending physician Dr. Hough.     Koffi Fortune MD  PGY1 Internal Medicine

## 2024-09-10 NOTE — PROGRESS NOTES
[facility]  TRAUMA SERVICE - PROGRESS NOTE    Patient Name: Travis Hunt  MRN: [id number]  Admit Date: 824  : 1945  AGE: 78 y.o.   GENDER: male  ==============================================================================  MECHANISM OF INJURY:   79 yo male came to [facility] as a transfer from [facility] after a fall backwards from a chair landing on the back of his head. + Headstrike. Unknown LOC. CTH with large SDH with midline shift and was transferred to [facility] for neurosurgical intervention and ICU admission. 3% NS at OSH. On arrival to [facility] campus, GCS 3. S/p emergent OR for decompressive craniotomy.     LOC (yes/no?): unknown  Anticoagulant / Anti-platelet Rx? (for what dx?): coumadin/ASA, plavix  Referring Facility Name (N/A for scene EMR run): [facility]    INJURIES:   R SDH with 1.5 cm midline shift    OTHER MEDICAL PROBLEMS:  HTN  HLD  CAD  PE/DVTs (on coumadin)  Renal cell carcinoma  VAP    INCIDENTAL FINDINGS:  Prior fracture deformity of L transverse process of L5.  R posterior rib fracture of 12th rib unchanged since scan on 22.  Densities around left kidney  Ventral hernia containing bowel segment.   Solid lung nodule in Right lower lobe increased in size from previous exam on 21 and now 0.7 X 0.6 cm    PROCEDURES:  : Right craniotomy for hematoma evacuation  : Bronchoscopy    ==============================================================================  TODAY'S ASSESSMENT AND PLAN OF CARE:  79 yo M s/p fall with R SDH on coumadin/ASA/plavix s/p decompressive craniotomy. Repeat CTH with continued blossoming of intraparenchymal hematoma but stable subdural and subarachnoid collections. Last CT head 24 - stable right frontal hematoma with surrounding edema, stable SDH. Now febrile, requiring levophed. Bronchoscopy  with BAL. E faecalis from respiratory cultures    - Continue tube feeds at goal  - Respiratory cultures growing  E. Coli and group B strep, now growing E. faecalis  - Continue Meropenem until 9/11, appreciate ID input  - Continue ongoing goals of care discussions. Will need trach and peg this week. Recommend engaging palliative vs ethics to assist family with decision making  - DVT ppx: lovenox 30 mg BID      ==============================================================================  CHIEF COMPLAINT / OVERNIGHT EVENTS:   No acute events overnight.    MEDICAL HISTORY / ROS:  Admission history and ROS reviewed. Pertinent changes as follows:  None    PHYSICAL EXAM:  Heart Rate:  []   Temp:  [35.9 °C (96.6 °F)-37.3 °C (99.1 °F)]   Resp:  [14-42]   BP: ()/(36-49)   Weight:  [160 kg (353 lb 2.8 oz)]   SpO2:  [98 %-100 %]   Vent Mode: Volume control/assist control  FiO2 (%):  [40 %] 40 %  S RR:  [14] 14  S VT:  [500 mL] 500 mL  PEEP/CPAP (cm H2O):  [5 cm H20] 5 cm H20  MAP (cm H2O):  [12-16] 14    Physical Exam  Constitutional:       Comments: Sedated   Eyes:      Pupils: Pupils are equal, round, and reactive to light.   Cardiovascular:      Rate and Rhythm: Normal rate and regular rhythm.   Pulmonary:      Effort: No respiratory distress.      Comments: Intubated  Abdominal:      General: There is no distension.      Palpations: Abdomen is soft.      Tenderness: There is no abdominal tenderness.   Skin:     General: Skin is warm and dry.   Neurological:      Comments: Spontaneously moves on right        IMAGING SUMMARY:    Chest x-ray 9/10: L sided consolidation with small L pleural effusion    LABS:  Results from last 7 days   Lab Units 09/10/24  0027 09/09/24  0133 09/08/24  0302 09/07/24  1233 09/07/24  0208 09/06/24  1807 09/06/24  1356 09/06/24  0446   WBC AUTO x10*3/uL 12.5* 12.0* 9.8   < > 10.2 10.5 11.2 11.4*   HEMOGLOBIN g/dL 7.4* 7.7* 7.2*   < > 7.1* 7.0* 6.6* 6.2*   HEMATOCRIT % 22.7* 23.7* 22.8*   < > 22.1* 21.1* 19.3* 18.5*   PLATELETS AUTO x10*3/uL 240 217 215   < > 212 215 236 251   NEUTROS PCT AUTO %   --   --   --   --   --  72.0 72.7 75.0   LYMPHO PCT MAN %  --   --   --   --  5.1  --   --   --    LYMPHS PCT AUTO %  --   --   --   --   --  10.1 9.9 8.0   MONO PCT MAN %  --   --   --   --  6.0  --   --   --    MONOS PCT AUTO %  --   --   --   --   --  8.4 8.3 7.7   EOSINO PCT MAN %  --   --   --   --  1.7  --   --   --    EOS PCT AUTO %  --   --   --   --   --  3.6 3.8 3.5    < > = values in this interval not displayed.     Results from last 7 days   Lab Units 09/05/24  0158 09/03/24  1821   APTT seconds 27 27   INR  1.3* 1.2*     Results from last 7 days   Lab Units 09/10/24  0027 09/09/24  0133 09/08/24  0302   SODIUM mmol/L 139 138 138   POTASSIUM mmol/L 4.9 4.3 4.0   CHLORIDE mmol/L 114* 111* 110*   CO2 mmol/L 21 21 20*   BUN mg/dL 73* 64* 61*   CREATININE mg/dL 2.90* 2.27* 1.92*   CALCIUM mg/dL 8.2* 8.1* 7.8*   PROTEIN TOTAL g/dL 5.0* 5.2* 5.1*   BILIRUBIN TOTAL mg/dL 0.7 0.9 0.9   ALK PHOS U/L 69 69 65   ALT U/L 27 31 35   AST U/L 29 30 45*   GLUCOSE mg/dL 221* 225* 185*     Results from last 7 days   Lab Units 09/10/24  0027 09/09/24  0133 09/08/24  0302   BILIRUBIN TOTAL mg/dL 0.7 0.9 0.9   BILIRUBIN DIRECT mg/dL 0.3 0.2 0.2       Results from last 7 days   Lab Units 09/10/24  0027 09/09/24  0356 09/08/24  0302   POCT PH, ARTERIAL pH 7.34* 7.39 7.39   POCT PCO2, ARTERIAL mm Hg 37* 30* 34*   POCT PO2, ARTERIAL mm Hg 149* 140* 122*   POCT HCO3 CALCULATED, ARTERIAL mmol/L 20.0* 18.2* 20.6*   POCT BASE EXCESS, ARTERIAL mmol/L -5.3* -6.0* -3.9*       I have reviewed all medications, laboratory results, and imaging pertinent for today's encounter.

## 2024-09-10 NOTE — PROGRESS NOTES
[place]  TRAUMA ICU - PROGRESS NOTE    Patient Name: Travis Hunt  MRN: [id number]  Admit Date: 824  : 1945  AGE: 78 y.o.   GENDER: male  ==============================================================================    MECHANISM OF INJURY:     79 yo male came to [facility] as a transfer from [facility] after a fall backwards from a chair.     LOC (yes/no?): unknown  Anticoagulant / Anti-platelet Rx? (for what dx?): ASA abd Plavix  Referring Facility Name (N/A for scene EMR run): [facility]     INJURIES:   R subdural hematoma with 1.5 cm Right to left shift     OTHER MEDICAL PROBLEMS:  Prior fracture deformity of L transverse process of L5.  R posterior rib fracture of 12th rib unchanged since scan on 22.    Hx. 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   Solid lung nodule in Right lower lobe increased in size from previous exam on 21 and now 0.7 X 0.6 cm.     PROCEDURES:  : Right craniotomy for SDH evacuation      ==============================================================================  TODAY'S ASSESSMENT AND PLAN OF CARE:  Travis Hunt is a 78 y.o. male in the ICU due to: intubation and mental status, pressors    NEURO/PAIN/SEDATION:   # Severe traumatic brain injury and SDH s/p decompressive crainiotomy on   - 7 day [facility] course completed   - Q4 hr neurochecks   - HOB elevated 30 degrees or more  - EEG showed no seizure activity  -Repeat head CT on 9/10 showed continued edema, hemorrhage, and infarcts      # Acute pain  -as needed Tylenol      RESPIRATORY: #Acute hypoxic respiratory failure secondary to traumatic brain injury  - continue ps trials as tolerated  - daily CXR and ABG as needed        CARDIOVASC:   # H/o CAD, HTN, HLD - s/p coronary stent.   -  Continue home atorvastatin 40 mg nightly  - holding home plavix and coumadin (home dosing Plavix 75 mg daily and coumadin 5 mg daily 5 days a week and 2.5 mg 2 days a week)  - no home medications for HTN      #Shock  - back on levo  - BAL with bronchoscopy on 8/30     - Cefepime was started at that time due to PCN allergy but transitioned to meropenem for Eeg changes       GI: #History of GERD  - Patient has a Dobbhoff in place, transition to Glucerna 1.5 at 45 cc/h.    - Bowel regimen      :   #History of CKD 3 in setting of renal cell carcinoma.    - Baseline creatinine of ~ 1.12  - Evans in place for strict I's and O's.    - daily RFP    #DARIUSZ  -Urine lytes showed intrinsic DARIUSZ  - consulted nephrology     #History of BPH  - Holding home tamsulosin at this point, will restart when appropriate next     HEMATOLOGIC: # Acute blood loss anemia, stable  - Last received PRBCs on 8/27/2024.  Continue to hold home Plavix and warfarin at this time in setting of head bleed.   - continue to monitor CBC, transfuse as appropriate   - Hgb stable today    #Delayed serologic transfusion reaction  - Type and screen identified new antibody     ENDOCRINE: No active issues  - q 4hr glucose checks  - SSI 4  -Lantus 20 PM     MUSCULOSKELETAL/SKIN: #Skin tear underlying pannus on the right  - Local wound care  - Patient is on a specialty bed  - Mepilex to bony prominences.  Continue to help patient turn every 2 hours.     INFECTIOUS DISEASE: # Resolved Leukocytosis  - BAL with bronchoscopy 8/30    - Cultures growing out Escherichia coli and group B strep    - Continue cefepime (allergy to penicillins) (started 8/30), continue for total of 5-7 day course     - 9/4 UA with reflex to culture. No growth on culture.     - 9/4 Resp culture: enterococcus, gram + cocci, gram - bacilli     - 9/4 vanco added    -  9/8 meropenem started  - ID consulted. Recommend Abx through 9/11     GI PROPHYLAXIS: N/A    DVT PROPHYLAXIS: SCDs, lovenox resumed      DISPOSITION: ICU. Ongoing GOC discussion with family.     Pt. Seen and discussed with Dr. Graham.     Bulmaor Urias MD  PGY-1 General Surgery  [id number]    ==============================================================================  CHIEF COMPLAINT / OVERNIGHT EVENTS / HPI:   NAEON. On levo 0.04-0.08 ovn    MEDICAL HISTORY / ROS:  Admission history and ROS reviewed. Pertinent changes as follows:  Unable to obtain ROS d/t mentation/ intubation.     PHYSICAL EXAM:  Heart Rate:  []   Temp:  [35.9 °C (96.6 °F)-36.8 °C (98.2 °F)]   Resp:  [14-42]   BP: ()/(37-49)   Weight:  [160 kg (353 lb 2.8 oz)]   SpO2:  [98 %-100 %]   Physical Exam  Vitals reviewed.   Constitutional:       Appearance: He is obese.   HENT:      Right Ear: External ear normal.      Left Ear: External ear normal.      Mouth/Throat:      Mouth: Mucous membranes are dry.      Pharynx: Oropharynx is clear.   Eyes:      Pupils: Pupils are equal, round, and reactive to light.   Neck:      Comments: Right internal jugular central line  in place   Cardiovascular:      Rate and Rhythm: Normal rate.      Pulses: Normal pulses.   Pulmonary:      Comments: Intubated, on vent  Abdominal:      Comments: Obese abd, soft, OG in place with tube feed infusing at goal    Genitourinary:     Comments: Evans in place draining clear yellow urine   Musculoskeletal:      Right lower leg: Edema present.      Left lower leg: Edema present.      Comments: Generalized +2 edema    Skin:     Capillary Refill: Capillary refill takes less than 2 seconds.     He withdraws to pain on sternal rub. Withdrew in LE b/l to noxious stimuli but not UEs. Does not track or blink to threat. Does not follow commands      LABS:  Results from last 7 days   Lab Units 09/10/24  0027 09/09/24  0133 09/08/24  0302 09/07/24  1233 09/07/24  0208 09/06/24  1807 09/06/24  1356 09/06/24  0446   WBC AUTO x10*3/uL 12.5* 12.0* 9.8   < > 10.2 10.5 11.2 11.4*   HEMOGLOBIN g/dL 7.4* 7.7* 7.2*   <  > 7.1* 7.0* 6.6* 6.2*   HEMATOCRIT % 22.7* 23.7* 22.8*   < > 22.1* 21.1* 19.3* 18.5*   PLATELETS AUTO x10*3/uL 240 217 215   < > 212 215 236 251   NEUTROS PCT AUTO %  --   --   --   --   --  72.0 72.7 75.0   LYMPHO PCT MAN %  --   --   --   --  5.1  --   --   --    LYMPHS PCT AUTO %  --   --   --   --   --  10.1 9.9 8.0   MONO PCT MAN %  --   --   --   --  6.0  --   --   --    MONOS PCT AUTO %  --   --   --   --   --  8.4 8.3 7.7   EOSINO PCT MAN %  --   --   --   --  1.7  --   --   --    EOS PCT AUTO %  --   --   --   --   --  3.6 3.8 3.5    < > = values in this interval not displayed.     Results from last 7 days   Lab Units 09/05/24 0158 09/03/24  1821   APTT seconds 27 27   INR  1.3* 1.2*     Results from last 7 days   Lab Units 09/10/24  0027 09/09/24  0133 09/08/24  0302   SODIUM mmol/L 139 138 138   POTASSIUM mmol/L 4.9 4.3 4.0   CHLORIDE mmol/L 114* 111* 110*   CO2 mmol/L 21 21 20*   BUN mg/dL 73* 64* 61*   CREATININE mg/dL 2.90* 2.27* 1.92*   CALCIUM mg/dL 8.2* 8.1* 7.8*   PROTEIN TOTAL g/dL 5.0* 5.2* 5.1*   BILIRUBIN TOTAL mg/dL 0.7 0.9 0.9   ALK PHOS U/L 69 69 65   ALT U/L 27 31 35   AST U/L 29 30 45*   GLUCOSE mg/dL 221* 225* 185*     Results from last 7 days   Lab Units 09/10/24  0027 09/09/24 0133 09/08/24  0302   BILIRUBIN TOTAL mg/dL 0.7 0.9 0.9   BILIRUBIN DIRECT mg/dL 0.3 0.2 0.2     Results from last 7 days   Lab Units 09/10/24  0027 09/09/24  0356 09/08/24  0302   POCT PH, ARTERIAL pH 7.34* 7.39 7.39   POCT PCO2, ARTERIAL mm Hg 37* 30* 34*   POCT PO2, ARTERIAL mm Hg 149* 140* 122*   POCT HCO3 CALCULATED, ARTERIAL mmol/L 20.0* 18.2* 20.6*   POCT BASE EXCESS, ARTERIAL mmol/L -5.3* -6.0* -3.9*

## 2024-09-10 NOTE — PROCEDURES
Arterial Line Insertion    Date/Time: 9/9/2024 10:27 PM    Performed by: Arabella Baxter MD  Authorized by: Arabella Baxter MD    Consent:     Risks, benefits, and alternatives were discussed: yes      Risks discussed:  Bleeding, infection, pain and repeat procedure  Universal protocol:     Procedure explained and questions answered to patient or proxy's satisfaction: yes      Patient identity confirmed:  Arm band  Indications:     Indications: hemodynamic monitoring and multiple ABGs    Pre-procedure details:     Skin preparation:  Chlorhexidine    Preparation: Patient was prepped and draped in sterile fashion    Sedation:     Sedation type:  None  Anesthesia:     Anesthesia method:  Local infiltration    Local anesthetic:  Lidocaine 1% w/o epi  Procedure details:     Location:  L radial    Needle gauge:  20 G    Placement technique:  Seldinger and ultrasound guided    Number of attempts:  1    Transducer: waveform confirmed and dampened amplitude    Post-procedure details:     Post-procedure:  Sterile dressing applied and sutured    CMS:  Normal    Procedure completion:  Tolerated

## 2024-09-10 NOTE — PROGRESS NOTES
ICU Attending Daily Progress Note    Travis Hunt is a 78 y.o. male, admitted 8/24/2024 with SDH following fall with headstrike.     Neuro: R SDH with 1.5 cm midline shift. GCS 3T, triple flexion in BLE. Multimodal pain control with Tylenol. No sedation. Repeat CTH was unchanged. Family personal Neurologist will be coming to bedside tomorrow. Prognosis is poor.  Pulm: on vent, unable to protect airway. New developing LLL consolidation on CXR but clinically unchanged  CV: Levo to maintain SBP >100 or MAP >65. Straight leg raise done at bedside indicates that he is not fluid responsive. Possible septic shock (pneumonia) vs vasoplegia. Home atorvastatin.   GI/Nutrition: Tube feeds at goal via dobhoff.. Appreciate Nutrition recs.  Renal: DARIUSZ on CKD. BUN is elevated to 73, will discuss with Nephrology about whether this could be contributing to his encephalopathy and if dialysis trial is warranted. I think this is unlikely because his neuro exam was the same even when is BUN was 30s.  Creatinine continues to worsen (2.9). Making adequate urine output. FeNa is intrinsic. Maintain renal perfusion and avoid nephrotoxic meds.   Skin/Musculoskeletal: frequent repositioning to prevent DTI worsening, known stage 1 wound present on admission  Endocrine: ISS, goal maintain euglycemia. Increase at bedtime Lantus to 20  Heme: anemia of critical illness, stable, no indication for transfusion  Infectious: Enterococcus in sputum- vanc/meropenem to end 9/11 (concern for cefepime induced neurotoxicity). Repeat blood cultures today.   PPx: Lovenox--> change to SQH today given worsening DARIUSZ. Home Coumadin (DVTs) being held in setting of TBI  Code status: Full Code. Ongoing goals of care discussion regarding trach/peg.     Case discussed with primary team (Trauma).    I personally spent a total of 35 minutes during this patient encounter, with over 50% of the time devoted to counseling the patient (or family) and coordination of care. This  time does not include any time spent on bedside procedures.  Please see resident/fellow/KARTHIKEYAN note for further details.

## 2024-09-10 NOTE — PROGRESS NOTES
Vancomycin Dosing by Pharmacy- FOLLOW UP    Travis Hunt is a 78 y.o. year old male who Pharmacy has been consulted for vancomycin dosing for CNS/meningoencephalitis. Based on the patient's indication and renal status this patient is being dosed based on a goal trough/random level of 15-20.     Renal function is currently declining (scr 2.90 on 9/10 - from 2.27 on )    Current vancomycin regimen: Dose by levels due to DARIUSZ    Most recent random level: 18.7  mcg/mL (goal range: 15-20)    Visit Vitals  BP (!) 89/49   Pulse 97   Temp 36.8 °C (98.2 °F) (Temporal)   Resp 24        Lab Results   Component Value Date    CREATININE 2.90 (H) 09/10/2024    CREATININE 2.27 (H) 2024    CREATININE 1.92 (H) 2024    CREATININE 1.71 (H) 2024        Patient weight is as follows:   Vitals:    09/10/24 0645   Weight: (!) 160 kg (353 lb 2.8 oz)       Cultures:  Susceptibility data for the encounter in last 14 days.  Collected Specimen Info Organism Ampicillin Cefazolin Ciprofloxacin Gentamicin Gentamicin Synergy Levofloxacin Penicillin Piperacillin/Tazobactam Trimethoprim/Sulfamethoxazole Vancomycin   24 Fluid from BAL Enterococcus faecalis  S     R   S   R  S     Mixed Microorganisms Resembling Upper Respiratory Isis                Mixed Anaerobic Bacteria             24 Fluid from BAL Escherichia coli  S  S  S  S   S   S  S      Streptococcus agalactiae (Group B Streptococcus)             24 Fluid from BAL Escherichia coli  S  S  S  S   S   S  S      Streptococcus agalactiae (Group B Streptococcus)                  I/O last 3 completed shifts:  In: 4339.3 (27.1 mL/kg) [I.V.:1679.3 (10.5 mL/kg); NG/GT:1860; IV Piggyback:800]  Out: 1505 (9.4 mL/kg) [Urine:1505 (0.3 mL/kg/hr)]  Weight: 160.2 kg   I/O during current shift:  I/O this shift:  In: 110.9 [I.V.:65.9; NG/GT:45]  Out: 30 [Urine:30]    Temp (24hrs), Av.7 °C (98.1 °F), Min:36.3 °C (97.3 °F), Max:37.3 °C (99.1  °F)      Assessment/Plan    Trough within range at 18.7 mcg/ml.  Will conservatively re-dose with 500 mg x 1 for indication of CNS.    The next level will be obtained on 9/11 at AM labs. May be obtained sooner if clinically indicated.   Will continue to monitor renal function daily while on vancomycin and order serum creatinine at least every 48 hours if not already ordered.  Follow for continued vancomycin needs, clinical response, and signs/symptoms of toxicity.     Previous ID note from 9/7 - cefepime, metronidazole, and vanc through 9/11. Antibiotic regimen changed from cefepime, metronidazole, and vanco to meropenem and vanc.  Unsure if abx stop date to remain 9/11.      LARY FLETCHER

## 2024-09-10 NOTE — CARE PLAN
The clinical goals for the shift include Patient will remain HDS throughout shift.      Problem: Skin  Goal: Decreased wound size/increased tissue granulation at next dressing change  Outcome: Progressing  Flowsheets (Taken 9/9/2024 2343)  Decreased wound size/increased tissue granulation at next dressing change: Protective dressings over bony prominences  Goal: Participates in plan/prevention/treatment measures  Outcome: Progressing  Flowsheets (Taken 9/9/2024 2343)  Participates in plan/prevention/treatment measures: Elevate heels  Goal: Prevent/manage excess moisture  Outcome: Progressing  Flowsheets (Taken 9/9/2024 2343)  Prevent/manage excess moisture: Cleanse incontinence/protect with barrier cream  Goal: Prevent/minimize sheer/friction injuries  Outcome: Progressing  Flowsheets (Taken 9/9/2024 2343)  Prevent/minimize sheer/friction injuries:   Use pull sheet   Turn/reposition every 2 hours/use positioning/transfer devices  Goal: Promote/optimize nutrition  Outcome: Progressing  Flowsheets (Taken 9/9/2024 2343)  Promote/optimize nutrition: Monitor/record intake including meals  Goal: Promote skin healing  Outcome: Progressing  Flowsheets (Taken 9/9/2024 2343)  Promote skin healing:   Turn/reposition every 2 hours/use positioning/transfer devices   Assess skin/pad under line(s)/device(s)   Protective dressings over bony prominences   Rotate device position/do not position patient on device     Problem: Fall/Injury  Goal: Not fall by end of shift  Outcome: Progressing  Goal: Be free from injury by end of the shift  Outcome: Progressing  Goal: Verbalize understanding of personal risk factors for fall in the hospital  Outcome: Progressing  Goal: Verbalize understanding of risk factor reduction measures to prevent injury from fall in the home  Outcome: Progressing  Goal: Use assistive devices by end of the shift  Outcome: Progressing  Goal: Pace activities to prevent fatigue by end of the shift  Outcome:  Progressing     Problem: Pain  Goal: Takes deep breaths with improved pain control throughout the shift  Outcome: Progressing  Goal: Turns in bed with improved pain control throughout the shift  Outcome: Progressing  Goal: Walks with improved pain control throughout the shift  Outcome: Progressing  Goal: Performs ADL's with improved pain control throughout shift  Outcome: Progressing  Goal: Participates in PT with improved pain control throughout the shift  Outcome: Progressing  Goal: Free from opioid side effects throughout the shift  Outcome: Progressing  Goal: Free from acute confusion related to pain meds throughout the shift  Outcome: Progressing     Problem: Pain - Adult  Goal: Verbalizes/displays adequate comfort level or baseline comfort level  Outcome: Progressing     Problem: Safety - Adult  Goal: Free from fall injury  Outcome: Progressing     Problem: Discharge Planning  Goal: Discharge to home or other facility with appropriate resources  Outcome: Progressing     Problem: Chronic Conditions and Co-morbidities  Goal: Patient's chronic conditions and co-morbidity symptoms are monitored and maintained or improved  Outcome: Progressing     Problem: Knowledge Deficit  Goal: Patient/family/caregiver demonstrates understanding of disease process, treatment plan, medications, and discharge instructions  Outcome: Progressing     Problem: Mechanical Ventilation  Goal: Patient Will Maintain Patent Airway  Outcome: Progressing  Goal: Oral health is maintained or improved  Outcome: Progressing  Goal: Tracheostomy will be managed safely  Outcome: Progressing  Goal: ET tube will be managed safely  Outcome: Progressing  Goal: Ability to express needs and understand communication  Outcome: Progressing  Goal: Mobility/activity is maintained at optimum level for patient  Outcome: Progressing

## 2024-09-11 LAB
ALBUMIN SERPL BCP-MCNC: 2.3 G/DL (ref 3.4–5)
ANION GAP BLDA CALCULATED.4IONS-SCNC: 11 MMO/L (ref 10–25)
ANION GAP SERPL CALC-SCNC: 14 MMOL/L (ref 10–20)
BASE EXCESS BLDA CALC-SCNC: -5.8 MMOL/L (ref -2–3)
BODY TEMPERATURE: 37 DEGREES CELSIUS
BUN SERPL-MCNC: 86 MG/DL (ref 6–23)
CA-I BLDA-SCNC: 1.3 MMOL/L (ref 1.1–1.33)
CALCIUM SERPL-MCNC: 8.4 MG/DL (ref 8.6–10.6)
CHLORIDE BLDA-SCNC: 114 MMOL/L (ref 98–107)
CHLORIDE SERPL-SCNC: 111 MMOL/L (ref 98–107)
CO2 SERPL-SCNC: 19 MMOL/L (ref 21–32)
CREAT SERPL-MCNC: 3.6 MG/DL (ref 0.5–1.3)
EGFRCR SERPLBLD CKD-EPI 2021: 17 ML/MIN/1.73M*2
ERYTHROCYTE [DISTWIDTH] IN BLOOD BY AUTOMATED COUNT: 18.6 % (ref 11.5–14.5)
GLUCOSE BLD MANUAL STRIP-MCNC: 149 MG/DL (ref 74–99)
GLUCOSE BLD MANUAL STRIP-MCNC: 164 MG/DL (ref 74–99)
GLUCOSE BLD MANUAL STRIP-MCNC: 169 MG/DL (ref 74–99)
GLUCOSE BLD MANUAL STRIP-MCNC: 178 MG/DL (ref 74–99)
GLUCOSE BLDA-MCNC: 193 MG/DL (ref 74–99)
GLUCOSE SERPL-MCNC: 189 MG/DL (ref 74–99)
HCO3 BLDA-SCNC: 19.4 MMOL/L (ref 22–26)
HCT VFR BLD AUTO: 21.7 % (ref 41–52)
HCT VFR BLD EST: 23 % (ref 41–52)
HGB BLD-MCNC: 7.1 G/DL (ref 13.5–17.5)
HGB BLDA-MCNC: 7.5 G/DL (ref 13.5–17.5)
INHALED O2 CONCENTRATION: 40 %
LACTATE BLDA-SCNC: 0.8 MMOL/L (ref 0.4–2)
MAGNESIUM SERPL-MCNC: 2.45 MG/DL (ref 1.6–2.4)
MCH RBC QN AUTO: 28.7 PG (ref 26–34)
MCHC RBC AUTO-ENTMCNC: 32.7 G/DL (ref 32–36)
MCV RBC AUTO: 88 FL (ref 80–100)
NRBC BLD-RTO: 0 /100 WBCS (ref 0–0)
OXYHGB MFR BLDA: 97.3 % (ref 94–98)
PATH REV-IMMUNOHEMATOLOGY-PR30: NORMAL
PCO2 BLDA: 36 MM HG (ref 38–42)
PH BLDA: 7.34 PH (ref 7.38–7.42)
PHOSPHATE SERPL-MCNC: 4.9 MG/DL (ref 2.5–4.9)
PLATELET # BLD AUTO: 232 X10*3/UL (ref 150–450)
PO2 BLDA: 137 MM HG (ref 85–95)
POTASSIUM BLDA-SCNC: 6 MMOL/L (ref 3.5–5.3)
POTASSIUM SERPL-SCNC: 5.3 MMOL/L (ref 3.5–5.3)
RBC # BLD AUTO: 2.47 X10*6/UL (ref 4.5–5.9)
SAO2 % BLDA: 100 % (ref 94–100)
SODIUM BLDA-SCNC: 138 MMOL/L (ref 136–145)
SODIUM SERPL-SCNC: 139 MMOL/L (ref 136–145)
VANCOMYCIN SERPL-MCNC: 25.6 UG/ML (ref 5–20)
WBC # BLD AUTO: 12.4 X10*3/UL (ref 4.4–11.3)

## 2024-09-11 PROCEDURE — 2500000005 HC RX 250 GENERAL PHARMACY W/O HCPCS: Performed by: STUDENT IN AN ORGANIZED HEALTH CARE EDUCATION/TRAINING PROGRAM

## 2024-09-11 PROCEDURE — 97530 THERAPEUTIC ACTIVITIES: CPT | Mod: GP

## 2024-09-11 PROCEDURE — 84132 ASSAY OF SERUM POTASSIUM: CPT

## 2024-09-11 PROCEDURE — 83735 ASSAY OF MAGNESIUM: CPT

## 2024-09-11 PROCEDURE — 2020000001 HC ICU ROOM DAILY

## 2024-09-11 PROCEDURE — 94003 VENT MGMT INPAT SUBQ DAY: CPT

## 2024-09-11 PROCEDURE — 82947 ASSAY GLUCOSE BLOOD QUANT: CPT

## 2024-09-11 PROCEDURE — 2500000004 HC RX 250 GENERAL PHARMACY W/ HCPCS (ALT 636 FOR OP/ED): Performed by: STUDENT IN AN ORGANIZED HEALTH CARE EDUCATION/TRAINING PROGRAM

## 2024-09-11 PROCEDURE — 2500000002 HC RX 250 W HCPCS SELF ADMINISTERED DRUGS (ALT 637 FOR MEDICARE OP, ALT 636 FOR OP/ED)

## 2024-09-11 PROCEDURE — 71045 X-RAY EXAM CHEST 1 VIEW: CPT | Performed by: RADIOLOGY

## 2024-09-11 PROCEDURE — 99232 SBSQ HOSP IP/OBS MODERATE 35: CPT

## 2024-09-11 PROCEDURE — 85027 COMPLETE CBC AUTOMATED: CPT

## 2024-09-11 PROCEDURE — 2500000004 HC RX 250 GENERAL PHARMACY W/ HCPCS (ALT 636 FOR OP/ED)

## 2024-09-11 PROCEDURE — 37799 UNLISTED PX VASCULAR SURGERY: CPT

## 2024-09-11 PROCEDURE — 2500000001 HC RX 250 WO HCPCS SELF ADMINISTERED DRUGS (ALT 637 FOR MEDICARE OP): Performed by: NURSE PRACTITIONER

## 2024-09-11 PROCEDURE — 97110 THERAPEUTIC EXERCISES: CPT | Mod: GP

## 2024-09-11 PROCEDURE — 80202 ASSAY OF VANCOMYCIN: CPT

## 2024-09-11 PROCEDURE — 2500000004 HC RX 250 GENERAL PHARMACY W/ HCPCS (ALT 636 FOR OP/ED): Performed by: PHYSICIAN ASSISTANT

## 2024-09-11 RX ORDER — HYDROMORPHONE HYDROCHLORIDE 1 MG/ML
0.4 INJECTION, SOLUTION INTRAMUSCULAR; INTRAVENOUS; SUBCUTANEOUS
Status: DISCONTINUED | OUTPATIENT
Start: 2024-09-11 | End: 2024-09-13 | Stop reason: HOSPADM

## 2024-09-11 RX ORDER — HYDROMORPHONE HYDROCHLORIDE 1 MG/ML
0.4 INJECTION, SOLUTION INTRAMUSCULAR; INTRAVENOUS; SUBCUTANEOUS
Status: DISCONTINUED | OUTPATIENT
Start: 2024-09-11 | End: 2024-09-11

## 2024-09-11 RX ORDER — MEROPENEM 1 G/1
1 INJECTION, POWDER, FOR SOLUTION INTRAVENOUS EVERY 12 HOURS
Status: COMPLETED | OUTPATIENT
Start: 2024-09-11 | End: 2024-09-11

## 2024-09-11 ASSESSMENT — PAIN - FUNCTIONAL ASSESSMENT
PAIN_FUNCTIONAL_ASSESSMENT: CPOT (CRITICAL CARE PAIN OBSERVATION TOOL)

## 2024-09-11 ASSESSMENT — COGNITIVE AND FUNCTIONAL STATUS - GENERAL
MOVING TO AND FROM BED TO CHAIR: TOTAL
MOBILITY SCORE: 6
TURNING FROM BACK TO SIDE WHILE IN FLAT BAD: TOTAL
CLIMB 3 TO 5 STEPS WITH RAILING: TOTAL
MOVING FROM LYING ON BACK TO SITTING ON SIDE OF FLAT BED WITH BEDRAILS: TOTAL
WALKING IN HOSPITAL ROOM: TOTAL
STANDING UP FROM CHAIR USING ARMS: TOTAL

## 2024-09-11 ASSESSMENT — PAIN SCALES - GENERAL: PAINLEVEL_OUTOF10: 0 - NO PAIN

## 2024-09-11 NOTE — ASSESSMENT & PLAN NOTE
ADDENDUM:  Noted plans to transition to comfort care tomorrow  Nephrology will sign off.     Koffi Fortune MD   PYG1 Internal Medicine

## 2024-09-11 NOTE — SIGNIFICANT EVENT
Trauma ICU Significant Event Note    [facility] aware of patient's transition to comfort care.    Tommy York, MS4  Medical Student, [facility]

## 2024-09-11 NOTE — CONSULTS
Vancomycin Dosing by Pharmacy- Cessation of Therapy    Consult to pharmacy for vancomycin dosing has been discontinued by the prescriber, pharmacy will sign off at this time.    Please call pharmacy if there are further questions or re-enter a consult if vancomycin is resumed.     Jo Ann Hawkins, PharmD

## 2024-09-11 NOTE — PROGRESS NOTES
Spiritual Care Visit    Clinical Encounter Type  Visited With: Family  Routine Visit: Follow-up  Continue Visiting: Yes  Crisis Visit: Trauma  Referral From: Nurse, Family  Referral To:               Sacramental Encounters  Sacrament of Sick-Anointing: Anointed         Family Spiritual Care Encounters  Family Coping: Sadness, Accepting  Family Participation in Care: Consistently demonstrated  Family Support During Treatment: Consistently demonstrated  Caregiver-Patient Relationship: Not compromised                   Taxonomy  Intended Effects: Convey a calming presence, Demonstrate caring and concern  Methods: Offer spiritual/Moravian support  Interventions: Provide hospitality, Silent prayer  Family requesting  to be available on Thursday at noon as family has decided to compassionately extubate patient.  spoke with patients spouse.  explained that our  is off on Thursday and assured family that patient has received the Anointing of the Sick.  also called families  (with their permission) to see if he can be bedside with family tomorrow.  explained that he has a  and would not be able to be here. Family understands.  offered to be present and have prayer with family and patient. They feels that would be very welcome.  will plan to be bedside with family tomorrow as they compassionately extubate.

## 2024-09-11 NOTE — CARE PLAN
The patient's goals for the shift include      The clinical goals for the shift include Patient will remain HDS throughout shift.      Problem: Skin  Goal: Decreased wound size/increased tissue granulation at next dressing change  Outcome: Progressing  Goal: Participates in plan/prevention/treatment measures  Outcome: Progressing  Goal: Prevent/manage excess moisture  Outcome: Progressing  Goal: Prevent/minimize sheer/friction injuries  Outcome: Progressing  Goal: Promote/optimize nutrition  Outcome: Progressing  Goal: Promote skin healing  Outcome: Progressing     Problem: Fall/Injury  Goal: Not fall by end of shift  Outcome: Progressing  Goal: Be free from injury by end of the shift  Outcome: Progressing  Goal: Verbalize understanding of personal risk factors for fall in the hospital  Outcome: Progressing  Goal: Verbalize understanding of risk factor reduction measures to prevent injury from fall in the home  Outcome: Progressing  Goal: Use assistive devices by end of the shift  Outcome: Progressing  Goal: Pace activities to prevent fatigue by end of the shift  Outcome: Progressing     Problem: Pain  Goal: Takes deep breaths with improved pain control throughout the shift  Outcome: Progressing  Goal: Turns in bed with improved pain control throughout the shift  Outcome: Progressing  Goal: Walks with improved pain control throughout the shift  Outcome: Progressing  Goal: Performs ADL's with improved pain control throughout shift  Outcome: Progressing  Goal: Participates in PT with improved pain control throughout the shift  Outcome: Progressing  Goal: Free from opioid side effects throughout the shift  Outcome: Progressing  Goal: Free from acute confusion related to pain meds throughout the shift  Outcome: Progressing     Problem: Pain - Adult  Goal: Verbalizes/displays adequate comfort level or baseline comfort level  Outcome: Progressing     Problem: Safety - Adult  Goal: Free from fall injury  Outcome:  Progressing     Problem: Discharge Planning  Goal: Discharge to home or other facility with appropriate resources  Outcome: Progressing     Problem: Chronic Conditions and Co-morbidities  Goal: Patient's chronic conditions and co-morbidity symptoms are monitored and maintained or improved  Outcome: Progressing     Problem: Knowledge Deficit  Goal: Patient/family/caregiver demonstrates understanding of disease process, treatment plan, medications, and discharge instructions  Outcome: Progressing     Problem: Mechanical Ventilation  Goal: Patient Will Maintain Patent Airway  Outcome: Progressing  Goal: Oral health is maintained or improved  Outcome: Progressing  Goal: Tracheostomy will be managed safely  Outcome: Progressing  Goal: ET tube will be managed safely  Outcome: Progressing  Goal: Ability to express needs and understand communication  Outcome: Progressing  Goal: Mobility/activity is maintained at optimum level for patient  Outcome: Progressing

## 2024-09-11 NOTE — PROGRESS NOTES
.[facility]  TRAUMA ICU - PROGRESS NOTE    Patient Name: Travis Hunt  MRN: [id number]  Admit Date: 824  : 1945  AGE: 78 y.o.   GENDER: male  ==============================================================================    MECHANISM OF INJURY:     77 yo male came to [facility] as a transfer from formerly [facility] after a fall backwards from a chair.     LOC (yes/no?): unknown  Anticoagulant / Anti-platelet Rx? (for what dx?): ASA abd Plavix  Referring Facility Name (N/A for scene EMR run): formerly [facility]     INJURIES:   R subdural hematoma with 1.5 cm Right to left shift     OTHER MEDICAL PROBLEMS:  Prior fracture deformity of L transverse process of L5.  R posterior rib fracture of 12th rib unchanged since scan on 22.    Hx. 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   Solid lung nodule in Right lower lobe increased in size from previous exam on 21 and now 0.7 X 0.6 cm.     PROCEDURES:  : Right craniotomy for SDH evacuation      ==============================================================================  TODAY'S ASSESSMENT AND PLAN OF CARE:  Travis Hunt is a 78 y.o. male in the ICU due to: intubation and mental status, pressors    NEURO/PAIN/SEDATION:   # Severe traumatic brain injury and SDH s/p decompressive crainiotomy on   - 7 day [facility] course completed   - Q4 hr neurochecks   - HOB elevated 30 degrees or more  - EEG showed no seizure activity  - Repeat head CT on 9/10 showed continued edema, hemorrhage, and infarcts    -Neuroprog consulted, Given the persistence of the patient's poor exam and imaging, his neurologic prognosis is guarded. The probability of meaningful neurological recovery is existent but unfortunately not very likely.     # Acute pain  -as needed  Tylenol      RESPIRATORY: #Acute hypoxic respiratory failure secondary to traumatic brain injury  - continue ps trials as tolerated  - daily CXR and ABG as needed        CARDIOVASC:   # H/o CAD, HTN, HLD - s/p coronary stent.   - Continue home atorvastatin 40 mg nightly  - holding home plavix and coumadin (home dosing Plavix 75 mg daily and coumadin 5 mg daily 5 days a week and 2.5 mg 2 days a week)  - no home medications for HTN      #Septic Shock  - low dose levo  - BAL with bronchoscopy on 8/30     - Cefepime was started at that time due to PCN allergy but transitioned to meropenem for Eeg changes       GI: #History of GERD  - Patient has a Dobbhoff in place, transition to Glucerna 1.5 at 45 cc/h.    - Bowel regimen      :   #DARIUSZ on  CKD 3 in setting of renal cell carcinoma.    - Baseline creatinine of ~ 1.12  - Evans in place for strict I's and O's.    - daily RFP  -worsening renal fxn, nephrology following  -Urine lytes showed intrinsic DARIUSZ  -consulted nephrology     #History of BPH  - Holding home tamsulosin at this point, will restart when appropriate      HEMATOLOGIC: # Acute blood loss anemia, stable  - Last received PRBCs on 8/27/2024.  Continue to hold home Plavix and warfarin at this time in setting of head bleed.   - continue to monitor CBC, transfuse as appropriate     #Delayed serologic transfusion reaction  - Type and screen identified new antibody     ENDOCRINE: No active issues  - q 4hr glucose checks  - SSI 4  -Lantus 20 PM     MUSCULOSKELETAL/SKIN: #Skin tear underlying pannus on the right  - Local wound care  - Patient is on a specialty bed  - Mepilex to bony prominences.  Continue to help patient turn every 2 hours.     INFECTIOUS DISEASE: # Resolved Leukocytosis  - BAL with bronchoscopy 8/30    - Cultures growing out Escherichia coli and group B strep    - Continue cefepime (allergy to penicillins) (started 8/30), continue for total of 5-7 day course     - 9/4 UA with reflex to culture. No  growth on culture.     - 9/4 Resp culture: enterococcus, gram + cocci, gram - bacilli     - 9/4 vanco added    -  9/8 meropenem started  - ID consulted. Recommend Abx through 9/11 STOP TODAY     GI PROPHYLAXIS: N/A    DVT PROPHYLAXIS: SCDs, SQH     DISPOSITION: ICU. Ongoing GOC discussion with family.     Critical Care Time exceeds 30 minutes spent with patient reviewing VSS/medications, obtaining subjective information, performing physical exam, discussing plan of care;  with greater than 50% of that time spent in patient room for the following reasons: resp failure, septic shock    Pt discussed with Dr. Graham.    Sanford Graham PA-C  Trauma, Critical Care, and Acute Care Surgery  Ext. Floor [id number]; ICU [id number]     ==============================================================================  CHIEF COMPLAINT / OVERNIGHT EVENTS / HPI:   NAEON. On levo 0.02-.05 ovn    MEDICAL HISTORY / ROS:  Admission history and ROS reviewed. Pertinent changes as follows:  Unable to obtain ROS d/t mentation/ intubation.     PHYSICAL EXAM:  Heart Rate:  []   Temp:  [36.4 °C (97.5 °F)-37.1 °C (98.8 °F)]   Resp:  [16-45]   BP: ()/(30-42)   SpO2:  [99 %-100 %]   Physical Exam  Vitals reviewed.   Constitutional:       Appearance: He is obese.   HENT:      Right Ear: External ear normal.      Left Ear: External ear normal.      Mouth/Throat:      Mouth: Mucous membranes are dry.      Pharynx: Oropharynx is clear.   Eyes:      Pupils: Pupils are equal, round, and reactive to light.   Neck:      Comments: Right internal jugular central line  in place   Cardiovascular:      Rate and Rhythm: Normal rate.      Pulses: Normal pulses.   Pulmonary:      Comments: Intubated, on vent  Abdominal:      Comments: Obese abd, soft, OG in place with tube feed infusing at goal    Genitourinary:     Comments: Evans in place draining clear yellow urine   Musculoskeletal:      Right lower leg: Edema present.      Left lower leg: Edema present.       Comments: Generalized +2 edema    Skin:     Capillary Refill: Capillary refill takes less than 2 seconds.   Neurological:      Comments: Withdrew in LE b/l to noxious stimuli but not UEs. Does not track or blink to threat. Does not follow commands           LABS:  Results from last 7 days   Lab Units 09/11/24  0130 09/10/24  0027 09/09/24  0133 09/07/24  1233 09/07/24  0208 09/06/24  1807 09/06/24  1356 09/06/24  0446   WBC AUTO x10*3/uL 12.4* 12.5* 12.0*   < > 10.2 10.5 11.2 11.4*   HEMOGLOBIN g/dL 7.1* 7.4* 7.7*   < > 7.1* 7.0* 6.6* 6.2*   HEMATOCRIT % 21.7* 22.7* 23.7*   < > 22.1* 21.1* 19.3* 18.5*   PLATELETS AUTO x10*3/uL 232 240 217   < > 212 215 236 251   NEUTROS PCT AUTO %  --   --   --   --   --  72.0 72.7 75.0   LYMPHO PCT MAN %  --   --   --   --  5.1  --   --   --    LYMPHS PCT AUTO %  --   --   --   --   --  10.1 9.9 8.0   MONO PCT MAN %  --   --   --   --  6.0  --   --   --    MONOS PCT AUTO %  --   --   --   --   --  8.4 8.3 7.7   EOSINO PCT MAN %  --   --   --   --  1.7  --   --   --    EOS PCT AUTO %  --   --   --   --   --  3.6 3.8 3.5    < > = values in this interval not displayed.     Results from last 7 days   Lab Units 09/05/24  0158   APTT seconds 27   INR  1.3*     Results from last 7 days   Lab Units 09/11/24  0130 09/10/24  0027 09/09/24  0133 09/08/24  0302   SODIUM mmol/L 139 139 138 138   POTASSIUM mmol/L 5.3 4.9 4.3 4.0   CHLORIDE mmol/L 111* 114* 111* 110*   CO2 mmol/L 19* 21 21 20*   BUN mg/dL 86* 73* 64* 61*   CREATININE mg/dL 3.60* 2.90* 2.27* 1.92*   CALCIUM mg/dL 8.4* 8.2* 8.1* 7.8*   PROTEIN TOTAL g/dL  --  5.0* 5.2* 5.1*   BILIRUBIN TOTAL mg/dL  --  0.7 0.9 0.9   ALK PHOS U/L  --  69 69 65   ALT U/L  --  27 31 35   AST U/L  --  29 30 45*   GLUCOSE mg/dL 189* 221* 225* 185*     Results from last 7 days   Lab Units 09/10/24  0027 09/09/24  0133 09/08/24  0302   BILIRUBIN TOTAL mg/dL 0.7 0.9 0.9   BILIRUBIN DIRECT mg/dL 0.3 0.2 0.2     Results from last 7 days   Lab Units  09/11/24  0131 09/10/24  0027 09/09/24  0356   POCT PH, ARTERIAL pH 7.34* 7.34* 7.39   POCT PCO2, ARTERIAL mm Hg 36* 37* 30*   POCT PO2, ARTERIAL mm Hg 137* 149* 140*   POCT HCO3 CALCULATED, ARTERIAL mmol/L 19.4* 20.0* 18.2*   POCT BASE EXCESS, ARTERIAL mmol/L -5.8* -5.3* -6.0*

## 2024-09-11 NOTE — PROGRESS NOTES
Travis Hunt is a 78 y.o. male on day 18 of admission presenting with Subdural hematoma (Multi).      Subjective   No events overnight. No agitation reported. Barely open eyes with sternum rubbing.        Objective     Last Recorded Vitals  BP (!) 134/38   Pulse 98   Temp 36.5 °C (97.7 °F) (Temporal)   Resp 20   Wt (!) 160 kg (353 lb 2.8 oz)   SpO2 99%   Intake/Output last 3 Shifts:    Intake/Output Summary (Last 24 hours) at 9/11/2024 1025  Last data filed at 9/11/2024 1000  Gross per 24 hour   Intake 2784.67 ml   Output 980 ml   Net 1804.67 ml       Admission Weight  Weight: 144 kg (317 lb 15.9 oz) (08/25/24 0200)    Daily Weight  09/10/24 : (!) 160 kg (353 lb 2.8 oz)    Image Results  XR chest 1 view  Narrative: Interpreted By:  Jose Raul Cooper,   STUDY:  XR CHEST 1 VIEW;  9/10/2024 8:27 am      INDICATION:  Signs/Symptoms:intubation.          COMPARISON:  Exam dated 09/09/2024      ACCESSION NUMBER(S):  [id number]      ORDERING CLINICIAN:  NKECHI GARCES      FINDINGS:  AP radiograph of the chest was provided.      Endotracheal tube tip is 5.8 cm above the dylon. Right IJ central  venous catheter tip projects over the mid SVC. Enteric tube projects  over the esophagus, tip not visualized.      CARDIOMEDIASTINAL SILHOUETTE:  Cardiomediastinal silhouette is normal in size and configuration.      LUNGS:  Left-greater-than-right basilar airspace opacities are similar to the  prior exam. Stable blunting of the left costophrenic angle. No  definite pneumothorax.      ABDOMEN:  No remarkable upper abdominal findings.      BONES:  No acute osseous changes.      Impression: 1.  Stable left-greater-than-right basilar atelectasis/consolidation  with small left pleural effusion.  2. Medical devices as above.              MACRO:  None      Signed by: Jose Raul Cooper 9/10/2024 9:34 AM  Dictation workstation:   [id number]      Physical Exam  Cardiovascular:      Rate and Rhythm: Normal rate and regular rhythm.   Pulmonary:       Breath sounds: No rhonchi or rales.      Comments: Mechanically ventilated  Abdominal:      General: Abdomen is flat.      Palpations: Abdomen is soft.   Musculoskeletal:      Right lower leg: Edema present.      Left lower leg: Edema present.      Comments: Bilateral upper extremities with edema       Relevant Results             Scheduled medications  atorvastatin, 40 mg, oral, Nightly  heparin, 7,500 Units, subcutaneous, q8h  insulin glargine, 20 Units, subcutaneous, q24h  insulin lispro, 0-20 Units, subcutaneous, TID  meropenem, 1 g, intravenous, q12h  oxygen, , inhalation, Continuous - Inhalation  pantoprazole, 40 mg, intravenous, Daily  perflutren lipid microspheres, 2 mL of dilution, intravenous, Once in imaging  perflutren protein A microsphere, 0.5 mL, intravenous, Once in imaging  polyethylene glycol, 17 g, oral, Daily  sennosides-docusate sodium, 1 tablet, oral, Nightly  sulfur hexafluoride microsphr, 2 mL, intravenous, Once in imaging      Continuous medications  norepinephrine, 0-0.5 mcg/kg/min, Last Rate: 0.04 mcg/kg/min (09/11/24 0955)  sodium chloride 0.9%, 50 mL/hr, Last Rate: 50 mL/hr (09/11/24 0400)      PRN medications  PRN medications: acetaminophen, alteplase, alteplase, dextrose, dextrose, glucagon, glucagon, lubricating eye drops, vancomycin    Results for orders placed or performed during the hospital encounter of 08/24/24 (from the past 24 hour(s))   POCT GLUCOSE   Result Value Ref Range    POCT Glucose 192 (H) 74 - 99 mg/dL   POCT GLUCOSE   Result Value Ref Range    POCT Glucose 178 (H) 74 - 99 mg/dL   CBC   Result Value Ref Range    WBC 12.4 (H) 4.4 - 11.3 x10*3/uL    nRBC 0.0 0.0 - 0.0 /100 WBCs    RBC 2.47 (L) 4.50 - 5.90 x10*6/uL    Hemoglobin 7.1 (L) 13.5 - 17.5 g/dL    Hematocrit 21.7 (L) 41.0 - 52.0 %    MCV 88 80 - 100 fL    MCH 28.7 26.0 - 34.0 pg    MCHC 32.7 32.0 - 36.0 g/dL    RDW 18.6 (H) 11.5 - 14.5 %    Platelets 232 150 - 450 x10*3/uL   Magnesium   Result Value Ref Range     Magnesium 2.45 (H) 1.60 - 2.40 mg/dL   Renal Function Panel   Result Value Ref Range    Glucose 189 (H) 74 - 99 mg/dL    Sodium 139 136 - 145 mmol/L    Potassium 5.3 3.5 - 5.3 mmol/L    Chloride 111 (H) 98 - 107 mmol/L    Bicarbonate 19 (L) 21 - 32 mmol/L    Anion Gap 14 10 - 20 mmol/L    Urea Nitrogen 86 (H) 6 - 23 mg/dL    Creatinine 3.60 (H) 0.50 - 1.30 mg/dL    eGFR 17 (L) >60 mL/min/1.73m*2    Calcium 8.4 (L) 8.6 - 10.6 mg/dL    Phosphorus 4.9 2.5 - 4.9 mg/dL    Albumin 2.3 (L) 3.4 - 5.0 g/dL   Blood Gas Arterial Full Panel   Result Value Ref Range    POCT pH, Arterial 7.34 (L) 7.38 - 7.42 pH    POCT pCO2, Arterial 36 (L) 38 - 42 mm Hg    POCT pO2, Arterial 137 (H) 85 - 95 mm Hg    POCT SO2, Arterial 100 94 - 100 %    POCT Oxy Hemoglobin, Arterial 97.3 94.0 - 98.0 %    POCT Hematocrit Calculated, Arterial 23.0 (L) 41.0 - 52.0 %    POCT Sodium, Arterial 138 136 - 145 mmol/L    POCT Potassium, Arterial 6.0 (H) 3.5 - 5.3 mmol/L    POCT Chloride, Arterial 114 (H) 98 - 107 mmol/L    POCT Ionized Calcium, Arterial 1.30 1.10 - 1.33 mmol/L    POCT Glucose, Arterial 193 (H) 74 - 99 mg/dL    POCT Lactate, Arterial 0.8 0.4 - 2.0 mmol/L    POCT Base Excess, Arterial -5.8 (L) -2.0 - 3.0 mmol/L    POCT HCO3 Calculated, Arterial 19.4 (L) 22.0 - 26.0 mmol/L    POCT Hemoglobin, Arterial 7.5 (L) 13.5 - 17.5 g/dL    POCT Anion Gap, Arterial 11 10 - 25 mmo/L    Patient Temperature 37.0 degrees Celsius    FiO2 40 %   Vancomycin   Result Value Ref Range    Vancomycin 25.6 (H) 5.0 - 20.0 ug/mL   POCT GLUCOSE   Result Value Ref Range    POCT Glucose 149 (H) 74 - 99 mg/dL       Assessment/Plan      Travis Hunt is a  78 y.o.  Year old , with PMHx of Travis Hunt is a 78 y.o. male with a past medical hx of HTN, HLD, CAD, previous PE/DVTs (on coumadin), renal cell carcinoma s/p nephrectomy who presented as a fall backward from a chair with positive headstrike who was diagnosed with a large subdural hematoma with midline shift. He was  taken to the OR for right craniotomy and subdural hematoma evacuation on 8/24. Patient required intubation, sedation and pressors, and had acute hypoxic respiratory failure. Patient with several complications during admission including ventilator associated pneumonia, septic shock requiring vasopressors, encephalopathy.     Azotemia worsening today, however unlikely to explain current neurological status. Electrolytes and acid base state is stable. Patient with some peripheral edema, but chest x ray unchanged no signs of pulmonary edema. We recommend to control/avoid possible factors contributing to DARIUSZ (please see recs below). No indication of early dialysis at this time.      #DARIUSZ on CKD, oliguric, stage 2  #History of CKD 3 in setting of renal cell carcinoma, s/p right nephrectomy   - Baseline creatinine is 1.5 (8/24/2024) eGFR 45 CKD 3a/3b  - Probably multifactorial etiology at this time. We suggest to keep risk factors and underline current problems under control (please find recommendations below). No indication of early dialysis at this point.   - Creatinine at the time of admission was 1.59 and started trending up from 9/6/2024 -- >2.9 (9/10) -->3.60 (9/11)  - BUN/Cr ratio: 23.8  - FeNa labs from 9/9 - 1.1% (indeterminate)  - UA 9/4 - Protein 100 mg/dL +2  - Blood pressure remained stable with need of norepinephrine  - Urine output was 0.2-0.3 ml/kg/hr last 24hrs  - Recent contrast studies No  - Recent nephrotoxic medication use: vancomycin, PPI   - Recent ACE/ARB/diuretic use: No     # Severe traumatic brain injury and SDH s/p decompressive crainiotomy on 8/24   #Acute hypoxic respiratory failure secondary to traumatic brain injury   # H/o CAD, HTN, HLD - s/p coronary stent.   #Shock   # Acute blood loss anemia   #Delayed serologic transfusion reaction     Kidney   - DARIUSZ on CKD, oliguric, Stage 2  - Baseline creatinine: 1.2-1.5  - Etiology: unclear, multifactorial  - UA showed:    - Urine electrolyes showed  FeNA/FeUrea of 1-4  - Clinical volume status: Euvolemic  - Electrolytes (Na, K, Ca, Phos) stable  - Acid base status: stable     Hemodynamics  - Blood pressures remained stable requiring pressor support during hospital course.      Avoid nephrotoxins  - Contrast, hyperglycemia, NSAIDs, ischemia (anemia)     Medications (causative)              - Example - Zosyn and vancomycin, NSAIDs, ACE/ARBs, etc.      Medications (adjustments)              - Medication adjustments based off current GFR: Meropenem/Vancomycin      Obstruction              -  Check Renal US     Recommendations:  - Order CBC with diff (look for eosinophilia)  - Pending Renal US   - Renal dosing for medications for latest eGFR, follow medication trough as appropriate - if continue with Meropenem, please adjust 500 mg BID. Adjust Vancomycin if need to continue.   - Stop PPI (Protonix) if possible, consider Famotidine   - Consider pRBC transfusion, try to keep Hb>8 mg/dL  - Close Glycemic control    - Indication for dialysis:  No   - Avoid nephrotoxins, contrast if possible  - strict Is/Os  - Avoid hypotension/rapid fluctuations in Bps  - Team will follow     Case discussed with attending physician Dr. Patel.      Koffi Fortune MD  PGY1 Internal Medicine     This patient has a central line   Reason for the central line remaining today? Parenteral medication    This patient has a urinary catheter   Reason for the urinary catheter remaining today? critically ill patient who need accurate urinary output measurements    This patient is intubated   Reason for patient to remain intubated today? they have continued cardiopulmonary lability/instability    Assessment & Plan  Subdural hematoma (Multi)    Stented coronary artery    Pulmonary embolus (Multi)    Irritable bowel syndrome    Anticoagulant long-term use    DVT (deep venous thrombosis) (Multi)      ADDENDUM:  Noted plans to transition to comfort care tomorrow  Nephrology will sign off.     Koffi  Epi Fortune MD   PYG1 Internal Medicine

## 2024-09-11 NOTE — PROGRESS NOTES
ICU Attending Daily Progress Note    Travis Hunt is a 78 y.o. male, admitted 8/24/2024 with SDH following fall with headstrike.     Neuro: R SDH with 1.5 cm midline shift. GCS 3T, triple flexion in BLE. Multimodal pain control with Tylenol. No sedation. Repeat CTH was unchanged. Family personal friend Neurologist will be coming to bedside today. Prognosis is poor.  Pulm: on vent, unable to protect airway. New developing LLL consolidation on CXR but clinically unchanged  CV: Levo to maintain SBP >100 or MAP >65. Not fluid responsive. Possible septic shock (pneumonia) vs vasoplegia.   GI/Nutrition: Tube feeds at goal via dobhoff.  Renal: DARIUSZ on CKD worsening. Nephrology consult. FeNa is intrinsic. Maintain renal perfusion and avoid nephrotoxic meds.   Skin/Musculoskeletal: frequent repositioning to prevent DTI worsening, known stage 1 wound present on admission  Endocrine: ISS and at bedtime lantus, goal maintain euglycemia.  Heme: anemia of critical illness, stable, no indication for transfusion  Infectious: Enterococcus in sputum- vanc/meropenem to end 9/11 (concern for cefepime induced neurotoxicity).  PPx: SQH. Home Coumadin (DVTs) being held in setting of TBI  Code status: Full Code.     3:21 PM  Family (wife Renu, two daughters) have decided to transition to comfort care. They plan to proceed with palliative extubation tomorrow. In meantime, will no escalate care and code status changed to DNR. Will stop all potentially uncomfortable measures including lab work, insulin, SQH. Dilaudid ordered prn.    Case discussed with primary team (Trauma).    I personally spent a total of 35 minutes during this patient encounter, with over 50% of the time devoted to counseling the patient (or family) and coordination of care. This time does not include any time spent on bedside procedures.  Please see resident/fellow/KARTHIKEYAN note for further details.

## 2024-09-11 NOTE — PROGRESS NOTES
Physical Therapy    Physical Therapy Treatment    Patient Name: Travis Hunt  MRN: [id number]  Department: [facility] TSICU  Room: 19/19-A  Today's Date: 9/11/2024  Time Calculation  Start Time: 1113  Stop Time: 1137  Time Calculation (min): 24 min     SPT under direct supervision of PT for entire session, Dayanara Carr, PT      Assessment/Plan   PT Assessment  End of Session Communication: Bedside nurse  Assessment Comment: Pt limited by decreased arousal with no command following. Pt had no blink to threat, and no withdraw to stim. PROM was performed on all joints with increased stiffness on R side. Pt was positioned for comfort and edema control.  End of Session Patient Position: Bed, 3 rail up (pt positioned for comfort, head elevated, arms under pillows for edema control)     PT Plan  Treatment/Interventions: Bed mobility, Transfer training, Gait training, Balance training, Strengthening, Endurance training, Range of motion, Therapeutic exercise, Therapeutic activity, Positioning, Postural re-education  PT Plan: Ongoing PT  PT Frequency: 2 times per week  PT Discharge Recommendations: Moderate intensity level of continued care  Equipment Recommended upon Discharge:  (TBD)  PT Recommended Transfer Status: Total assist  PT - OK to Discharge: Yes      General Visit Information:   PT  Visit  PT Received On: 09/11/24  General  Family/Caregiver Present: Yes (wife and 2 daughters)  Patient Position Received: Bed, 3 rail up  General Comment: Per RN, pt ok to see. Pt not following commands (ETT to vent, VC-AC: Peep 5.0, FIO2 40, 14 RR)    Subjective   Precautions:  Precautions  Medical Precautions: Oxygen therapy device and L/min, Fall precautions       09/11/24 1113 09/11/24 1115 09/11/24 1116   Vital Signs   Vitals Session Pre PT  --   --    Heart Rate 100 99  --    Resp 23 (!) 27 21   SpO2 100 % 99 %  --    BP (!) 116/40  --   --       09/11/24 1130 09/11/24 1137   Vital Signs   Vitals Session  --  Post PT   Heart Rate 94 96    Resp  --  23   SpO2 100 % 99 %   BP  --  (!) 115/41             Objective   Pain:  Pain Assessment  Pain Assessment: CPOT (Critical Care Pain Observation Tool)  0-10 (Numeric) Pain Score: 0 - No pain  Cognition:  Cognition  Overall Cognitive Status: Impaired  Arousal/Alertness: Unresponsive to stimuli  Orientation Level: Unable to assess  Following Commands:  (no command following, spontaneous movement of RUE)  Cognition Comments: no blink to threat, no command following, spontaneous movement of RUE. RLA II.  Processing Speed: Delayed  Coordination:     Postural Control:     Extremity/Trunk Assessments:  RUE   RUE :  (resistance with PROM for shoulder flexion)  LUE   LUE:  (Prom WFL)  RLE   RLE :  (Resistance with PROM for hip flexion and knee flexion)  LLE   LLE :  (PROM WFL)  Activity Tolerance:  Activity Tolerance  Endurance: Tolerates less than 10 min exercise with changes in vital signs (pt's BP dropped to 90/33 ~5 min of upright head, RN notified, pt returned to supine and SCD's donned. BP returned to 115/41)  Early Mobility/Exercise Safety Screen: Proceed with mobilization - No exclusion criteria met  Treatments:  Therapeutic Exercise  Therapeutic Exercise Performed: Yes  Therapeutic Exercise Activity 1: PROM to all joints for 8 min,  resistance on R side    Therapeutic Activity  Therapeutic Activity Performed: Yes  Therapeutic Activity 1: HOB elevation and repositioning. Pt's eyes  manuallty opened with some tracking. Pt positioned for comfort and BUE positioned for edema management.    Outcome Measures:  [facility] Basic Mobility  Turning from your back to your side while in a flat bed without using bedrails: Total  Moving from lying on your back to sitting on the side of a flat bed without using bedrails: Total  Moving to and from bed to chair (including a wheelchair): Total  Standing up from a chair using your arms (e.g. wheelchair or bedside chair): Total  To walk in hospital room: Total  Climbing 3-5 steps  with railing: Total  Basic Mobility - Total Score: 6    FSS-ICU  Ambulation: Unable to attempt due to weakness  Rolling: Unable to perform  Sitting: Unable to perform  Transfer Sit-to-Stand: Unable to perform  Transfer Supine-to-Sit: Unable to perform  Total Score: 0      Early Mobility/Exercise Safety Screen: Proceed with mobilization - No exclusion criteria met  ICU Mobility Scale: Sitting in bed, exercises in bed [1]    Education Documentation  Precautions, taught by JOSÉ ANTONIO Nesbitt at 9/11/2024  2:56 PM.  Learner: Family  Readiness: Acceptance  Method: Explanation  Response: Verbalizes Understanding    Body Mechanics, taught by JOSÉ ANTONIO Nesbitt at 9/11/2024  2:56 PM.  Learner: Family  Readiness: Acceptance  Method: Explanation  Response: Verbalizes Understanding    Mobility Training, taught by JOSÉ ANTONIO Nesbitt at 9/11/2024  2:56 PM.  Learner: Family  Readiness: Acceptance  Method: Explanation  Response: Verbalizes Understanding    Education Comments  No comments found.        OP EDUCATION:       Encounter Problems       Encounter Problems (Active)       Balance       Pt will sit EOB >5 minutes with maxA x 1 (Not Progressing)       Start:  08/28/24    Expected End:  09/11/24               Mobility       Pt will follow >50% of body centered commands  (Not Progressing)       Start:  08/28/24    Expected End:  09/11/24               PT Transfers       STG - Patient to transfer to and from sit to supine maxA  (Not Progressing)       Start:  08/28/24    Expected End:  09/11/24            STG - Patient will transfer sit to and from stand maxA  (Not Progressing)       Start:  08/28/24    Expected End:  09/11/24               Pain - Adult

## 2024-09-11 NOTE — PROGRESS NOTES
[facility]  TRAUMA SERVICE - PROGRESS NOTE    Patient Name: Travis Hunt  MRN: [id number]  Admit Date: 824  : 1945  AGE: 78 y.o.   GENDER: male  ==============================================================================  MECHANISM OF INJURY:   79 yo male came to [facility] as a transfer from [facility] after a fall backwards from a chair landing on the back of his head. + Headstrike. Unknown LOC. CTH with large SDH with midline shift and was transferred to [facility] for neurosurgical intervention and ICU admission. 3% NS at OSH. On arrival to [facility] campus, GCS 3. S/p emergent OR for decompressive craniotomy.     LOC (yes/no?): unknown  Anticoagulant / Anti-platelet Rx? 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  R posterior rib fracture of 12th rib unchanged since scan on 22.  Densities around left kidney  Ventral hernia containing bowel segment.   Solid lung nodule in Right lower lobe increased in size from previous exam on 21 and now 0.7 X 0.6 cm    PROCEDURES:  : Right craniotomy for hematoma evacuation  : Bronchoscopy    ==============================================================================  TODAY'S ASSESSMENT AND PLAN OF CARE:  79 yo M s/p fall with R SDH on coumadin/ASA/plavix s/p decompressive craniotomy. Repeat CTH with continued blossoming of intraparenchymal hematoma but stable subdural and subarachnoid collections. Last CT head 24 - stable right frontal hematoma with surrounding edema, stable SDH. Now febrile, requiring levophed. Bronchoscopy  with BAL. E faecalis from respiratory cultures    -Goals of care discussion held today, family would like to move  towards comfort care tomorrow 9/12.  -Code status updated to DNR today    Patient discussed with attending Dr. Emilio Chávez MD  PGY-4 General Surgery        ==============================================================================  CHIEF COMPLAINT / OVERNIGHT EVENTS:   No acute events overnight.    MEDICAL HISTORY / ROS:  Admission history and ROS reviewed. Pertinent changes as follows:  None    PHYSICAL EXAM:  Heart Rate:  []   Temp:  [36.4 °C (97.5 °F)-37.1 °C (98.8 °F)]   Resp:  [16-45]   BP: ()/(30-42)   SpO2:  [99 %-100 %]   Vent Mode: Volume control/assist control  S RR:  [14] 14  S VT:  [500 mL] 500 mL  PEEP/CPAP (cm H2O):  [5 cm H20] 5 cm H20  MAP (cm H2O):  [11-14] 14    Physical Exam  Constitutional:       Comments: Sedated   Eyes:      Pupils: Pupils are equal, round, and reactive to light.   Cardiovascular:      Rate and Rhythm: Normal rate and regular rhythm.   Pulmonary:      Effort: No respiratory distress.      Comments: Intubated  Abdominal:      General: There is no distension.      Palpations: Abdomen is soft.      Tenderness: There is no abdominal tenderness.   Skin:     General: Skin is warm and dry.   Neurological:      Comments: Spontaneously moves on right        IMAGING SUMMARY:    Chest x-ray 9/10: ETT in place, stable basilar atelectasis    LABS:  Results from last 7 days   Lab Units 09/11/24  0130 09/10/24  0027 09/09/24  0133 09/07/24  1233 09/07/24  0208 09/06/24  1807 09/06/24  1356 09/06/24  0446   WBC AUTO x10*3/uL 12.4* 12.5* 12.0*   < > 10.2 10.5 11.2 11.4*   HEMOGLOBIN g/dL 7.1* 7.4* 7.7*   < > 7.1* 7.0* 6.6* 6.2*   HEMATOCRIT % 21.7* 22.7* 23.7*   < > 22.1* 21.1* 19.3* 18.5*   PLATELETS AUTO x10*3/uL 232 240 217   < > 212 215 236 251   NEUTROS PCT AUTO %  --   --   --   --   --  72.0 72.7 75.0   LYMPHO PCT MAN %  --   --   --   --  5.1  --   --   --    LYMPHS PCT AUTO %  --   --   --   --   --  10.1 9.9 8.0   MONO PCT MAN %  --   --   --   --   6.0  --   --   --    MONOS PCT AUTO %  --   --   --   --   --  8.4 8.3 7.7   EOSINO PCT MAN %  --   --   --   --  1.7  --   --   --    EOS PCT AUTO %  --   --   --   --   --  3.6 3.8 3.5    < > = values in this interval not displayed.     Results from last 7 days   Lab Units 09/05/24  0158   APTT seconds 27   INR  1.3*     Results from last 7 days   Lab Units 09/11/24  0130 09/10/24  0027 09/09/24  0133 09/08/24  0302   SODIUM mmol/L 139 139 138 138   POTASSIUM mmol/L 5.3 4.9 4.3 4.0   CHLORIDE mmol/L 111* 114* 111* 110*   CO2 mmol/L 19* 21 21 20*   BUN mg/dL 86* 73* 64* 61*   CREATININE mg/dL 3.60* 2.90* 2.27* 1.92*   CALCIUM mg/dL 8.4* 8.2* 8.1* 7.8*   PROTEIN TOTAL g/dL  --  5.0* 5.2* 5.1*   BILIRUBIN TOTAL mg/dL  --  0.7 0.9 0.9   ALK PHOS U/L  --  69 69 65   ALT U/L  --  27 31 35   AST U/L  --  29 30 45*   GLUCOSE mg/dL 189* 221* 225* 185*     Results from last 7 days   Lab Units 09/10/24  0027 09/09/24 0133 09/08/24  0302   BILIRUBIN TOTAL mg/dL 0.7 0.9 0.9   BILIRUBIN DIRECT mg/dL 0.3 0.2 0.2       Results from last 7 days   Lab Units 09/11/24  0131 09/10/24  0027 09/09/24  0356   POCT PH, ARTERIAL pH 7.34* 7.34* 7.39   POCT PCO2, ARTERIAL mm Hg 36* 37* 30*   POCT PO2, ARTERIAL mm Hg 137* 149* 140*   POCT HCO3 CALCULATED, ARTERIAL mmol/L 19.4* 20.0* 18.2*   POCT BASE EXCESS, ARTERIAL mmol/L -5.8* -5.3* -6.0*       I have reviewed all medications, laboratory results, and imaging pertinent for today's encounter.

## 2024-09-11 NOTE — SIGNIFICANT EVENT
Code status discussion held with family. After discussions had regarding overall prognosis of patient, wife and family would like to transition toward comfort focused care with plans for comfort care and palliative extubation tomorrow 9/12. Family voiced that he would not want CPR or chest compressions in the event of worsening clinical status. Code status at this time changed to DNR.     Raysa Fitch MD   Surgical Critical Care Fellow

## 2024-09-12 VITALS
SYSTOLIC BLOOD PRESSURE: 115 MMHG | WEIGHT: 315 LBS | RESPIRATION RATE: 22 BRPM | HEIGHT: 69 IN | DIASTOLIC BLOOD PRESSURE: 41 MMHG | TEMPERATURE: 97.7 F | BODY MASS INDEX: 46.65 KG/M2 | HEART RATE: 84 BPM | OXYGEN SATURATION: 91 %

## 2024-09-12 LAB
GLUCOSE BLD MANUAL STRIP-MCNC: 146 MG/DL (ref 74–99)
GLUCOSE BLD MANUAL STRIP-MCNC: 162 MG/DL (ref 74–99)

## 2024-09-12 PROCEDURE — 2500000004 HC RX 250 GENERAL PHARMACY W/ HCPCS (ALT 636 FOR OP/ED)

## 2024-09-12 PROCEDURE — 99238 HOSP IP/OBS DSCHRG MGMT 30/<: CPT | Performed by: NURSE PRACTITIONER

## 2024-09-12 PROCEDURE — 94003 VENT MGMT INPAT SUBQ DAY: CPT

## 2024-09-12 PROCEDURE — 82947 ASSAY GLUCOSE BLOOD QUANT: CPT

## 2024-09-12 RX ORDER — LORAZEPAM 2 MG/ML
0.5 INJECTION INTRAMUSCULAR EVERY 4 HOURS PRN
Status: DISCONTINUED | OUTPATIENT
Start: 2024-09-12 | End: 2024-09-13 | Stop reason: HOSPADM

## 2024-09-12 RX ORDER — LORAZEPAM 2 MG/ML
INJECTION INTRAMUSCULAR
Status: DISCONTINUED
Start: 2024-09-12 | End: 2024-09-13 | Stop reason: HOSPADM

## 2024-09-12 RX ORDER — GLYCOPYRROLATE 0.2 MG/ML
0.2 INJECTION INTRAMUSCULAR; INTRAVENOUS EVERY 4 HOURS PRN
Status: DISCONTINUED | OUTPATIENT
Start: 2024-09-12 | End: 2024-09-13 | Stop reason: HOSPADM

## 2024-09-12 RX ORDER — LORAZEPAM 2 MG/ML
0.5 INJECTION INTRAMUSCULAR EVERY 4 HOURS
Status: DISCONTINUED | OUTPATIENT
Start: 2024-09-12 | End: 2024-09-12

## 2024-09-12 RX ORDER — FENTANYL CITRATE-0.9 % NACL/PF 10 MCG/ML
25-200 PLASTIC BAG, INJECTION (ML) INTRAVENOUS CONTINUOUS
Status: DISCONTINUED | OUTPATIENT
Start: 2024-09-12 | End: 2024-09-13 | Stop reason: HOSPADM

## 2024-09-12 ASSESSMENT — RESPIRATORY DISTRESS OBSERVATION SCALE (RDOS)
RESPIRATORY RATE PER MINUTE: 1 - 19-30 BREATHS
ACCESSORY MUSCLE RISE IN CLAVICLE DURING INSPIRATION: 0 - NONE
RDOS TOTAL SCORE: 2
LOOK OF FEAR: 0 - NONE
RDOS TOTAL SCORE: 3
GRUNTING AT END OF EXPIRATION: 0 - NONE
PARADOXICAL BREATHING PATTERN: 0 - NONE
INVOLUNTARY NASAL FLARING: 0 - NONE
RESPIRATORY RATE PER MINUTE: 1 - 19-30 BREATHS
HEART RATE PER MINUTE: 1 - 90-109 BEATS
HEART RATE PER MINUTE: 0 - <90 BEATS
ACCESSORY MUSCLE RISE IN CLAVICLE DURING INSPIRATION: 0 - NONE
RESTLESS NONPURPOSEFUL MOVEMENTS: 0 - NONE
GRUNTING AT END OF EXPIRATION: 0 - NONE
PARADOXICAL BREATHING PATTERN: 2 - PRESENT
INVOLUNTARY NASAL FLARING: 0 - NONE
RESTLESS NONPURPOSEFUL MOVEMENTS: 0 - NONE
RESPIRATORY RATE PER MINUTE: 1 - 19-30 BREATHS
INVOLUNTARY NASAL FLARING: 0 - NONE
INVOLUNTARY NASAL FLARING: 0 - NONE
GRUNTING AT END OF EXPIRATION: 0 - NONE
HEART RATE PER MINUTE: 0 - <90 BEATS
HEART RATE PER MINUTE: 1 - 90-109 BEATS
RESTLESS NONPURPOSEFUL MOVEMENTS: 0 - NONE
RESPIRATORY RATE PER MINUTE: 1 - 19-30 BREATHS
RDOS TOTAL SCORE: 1
ACCESSORY MUSCLE RISE IN CLAVICLE DURING INSPIRATION: 0 - NONE
ACCESSORY MUSCLE RISE IN CLAVICLE DURING INSPIRATION: 0 - NONE
RDOS TOTAL SCORE: 1
PARADOXICAL BREATHING PATTERN: 0 - NONE
RESTLESS NONPURPOSEFUL MOVEMENTS: 0 - NONE
RDOS TOTAL SCORE: 1
LOOK OF FEAR: 0 - NONE
INVOLUNTARY NASAL FLARING: 0 - NONE
LOOK OF FEAR: 0 - NONE
RESPIRATORY RATE PER MINUTE: 1 - 19-30 BREATHS
RESPIRATORY RATE PER MINUTE: 1 - 19-30 BREATHS
PARADOXICAL BREATHING PATTERN: 0 - NONE
INVOLUNTARY NASAL FLARING: 0 - NONE
GRUNTING AT END OF EXPIRATION: 0 - NONE
LOOK OF FEAR: 0 - NONE
LOOK OF FEAR: 0 - NONE
RDOS TOTAL SCORE: 2
HEART RATE PER MINUTE: 0 - <90 BEATS
PARADOXICAL BREATHING PATTERN: 0 - NONE
GRUNTING AT END OF EXPIRATION: 0 - NONE
LOOK OF FEAR: 0 - NONE
ACCESSORY MUSCLE RISE IN CLAVICLE DURING INSPIRATION: 0 - NONE
RESTLESS NONPURPOSEFUL MOVEMENTS: 0 - NONE
ACCESSORY MUSCLE RISE IN CLAVICLE DURING INSPIRATION: 0 - NONE
PARADOXICAL BREATHING PATTERN: 0 - NONE
GRUNTING AT END OF EXPIRATION: 0 - NONE
RESTLESS NONPURPOSEFUL MOVEMENTS: 0 - NONE
HEART RATE PER MINUTE: 0 - <90 BEATS

## 2024-09-12 ASSESSMENT — PAIN - FUNCTIONAL ASSESSMENT: PAIN_FUNCTIONAL_ASSESSMENT: CPOT (CRITICAL CARE PAIN OBSERVATION TOOL)

## 2024-09-12 NOTE — CARE PLAN
The patient's goals for the shift include   Problem: Skin  Goal: Decreased wound size/increased tissue granulation at next dressing change  Outcome: Not Progressing  Goal: Participates in plan/prevention/treatment measures  Outcome: Not Progressing  Goal: Prevent/manage excess moisture  Outcome: Not Progressing  Goal: Prevent/minimize sheer/friction injuries  Outcome: Not Progressing  Goal: Promote/optimize nutrition  Outcome: Not Progressing  Goal: Promote skin healing  Outcome: Not Progressing     Problem: Fall/Injury  Goal: Not fall by end of shift  Outcome: Not Progressing  Goal: Be free from injury by end of the shift  Outcome: Not Progressing  Goal: Verbalize understanding of personal risk factors for fall in the hospital  Outcome: Not Progressing  Goal: Verbalize understanding of risk factor reduction measures to prevent injury from fall in the home  Outcome: Not Progressing  Goal: Use assistive devices by end of the shift  Outcome: Not Progressing  Goal: Pace activities to prevent fatigue by end of the shift  Outcome: Not Progressing     Problem: Pain  Goal: Takes deep breaths with improved pain control throughout the shift  Outcome: Not Progressing  Goal: Turns in bed with improved pain control throughout the shift  Outcome: Not Progressing  Goal: Walks with improved pain control throughout the shift  Outcome: Not Progressing  Goal: Performs ADL's with improved pain control throughout shift  Outcome: Not Progressing  Goal: Participates in PT with improved pain control throughout the shift  Outcome: Not Progressing  Goal: Free from opioid side effects throughout the shift  Outcome: Not Progressing  Goal: Free from acute confusion related to pain meds throughout the shift  Outcome: Not Progressing     Problem: Pain - Adult  Goal: Verbalizes/displays adequate comfort level or baseline comfort level  Outcome: Not Progressing     Problem: Safety - Adult  Goal: Free from fall injury  Outcome: Not Progressing      Problem: Discharge Planning  Goal: Discharge to home or other facility with appropriate resources  Outcome: Not Progressing     Problem: Chronic Conditions and Co-morbidities  Goal: Patient's chronic conditions and co-morbidity symptoms are monitored and maintained or improved  Outcome: Not Progressing     Problem: Knowledge Deficit  Goal: Patient/family/caregiver demonstrates understanding of disease process, treatment plan, medications, and discharge instructions  Outcome: Not Progressing     Problem: Mechanical Ventilation  Goal: Patient Will Maintain Patent Airway  Outcome: Not Progressing  Goal: Oral health is maintained or improved  Outcome: Not Progressing  Goal: Tracheostomy will be managed safely  Outcome: Not Progressing  Goal: ET tube will be managed safely  Outcome: Not Progressing  Goal: Ability to express needs and understand communication  Outcome: Not Progressing  Goal: Mobility/activity is maintained at optimum level for patient  Outcome: Not Progressing        The clinical goals for the shift include Patient will remain HDS throughout shift.    Over the shift, the patient did not make progress toward the following goals. Barriers to progression include Pts current condition.

## 2024-09-12 NOTE — PROGRESS NOTES
Spiritual Care Visit    Clinical Encounter Type  Visited With: Family  Routine Visit: Follow-up  Continue Visiting: Yes  Surgical Visit: Post-op  Crisis Visit: Trauma  Referral From: Family  Referral To:     Sabianism Encounters  Sabianism Needs: Prayer, Sacred text    Values/Beliefs  Spiritual Requests During Hospitalization: prayers, scripture, comfort and support    Sacramental Encounters  Communion: Patient is currently unable  Sacrament of Sick-Anointing: Anointed         Family Spiritual Care Encounters  Family Coping: Sadness                   Taxonomy  Intended Effects: Convey a calming presence, Ivania affirmation, Promote sense of peace  Methods: Offer spiritual/Rastafarian support, Offer support  Interventions: [place], Provide compassionate touch, Provide sacred reading(s)  Follow up spiritual care visit with family of patient. Many family members are bedside. Family asked  to provide prayers and scripture readings for patient that are comforting. Family is tearful and of course, sad, but realize their loved one would not want to be on a ventilator or not be able to be active.  provided support, comfort and non anxious presence.  will remain available as needed.

## 2024-09-12 NOTE — SIGNIFICANT EVENT
Decision was made by family to make patient comfort care.     Time of death:     There were no spontaneous respirations. No spontaneous movements. No palpable pulse. No breath sounds on exam. No heart sounds to auscultation. Pupils fixed and dilated. Asystole on monitor. Patient was pronounced .      Attending notified. Will continue to attempt to notify family.     Bulmaro Urias MD  PGY1 General Surgery

## 2024-09-12 NOTE — PROGRESS NOTES
[facility]  TRAUMA SERVICE - PROGRESS NOTE    Patient Name: Travis Hunt  MRN: [id number]  Admit Date: 824  : 1945  AGE: 78 y.o.   GENDER: male  ==============================================================================  MECHANISM OF INJURY:   79 yo male came to [facility] as a transfer from [facility] after a fall backwards from a chair landing on the back of his head. + Headstrike. Unknown LOC. CTH with large SDH with midline shift and was [place] [facility] [place] intervention and ICU admission. 3% NS at OSH. On arrival to [facility] campus, GCS 3. S/p emergent OR for decompressive craniotomy.     LOC (yes/no?): unknown  Anticoagulant / Anti-platelet Rx? 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  R posterior rib fracture of 12th rib unchanged since scan on 22.  Densities around left kidney  Ventral hernia containing bowel segment.   Solid lung nodule in Right lower lobe increased in size from previous exam on 21 and now 0.7 X 0.6 cm    PROCEDURES:  : Right craniotomy for hematoma evacuation  : Bronchoscopy    ==============================================================================  TODAY'S ASSESSMENT AND PLAN OF CARE:  79 yo M s/p fall with R SDH on coumadin/ASA/plavix s/p decompressive craniotomy. Repeat CTH with continued blossoming of intraparenchymal hematoma but stable subdural and subarachnoid collections. Last CT head 24 - stable right frontal hematoma with surrounding edema, stable SDH. Now febrile, requiring levophed. Bronchoscopy  with BAL. E faecalis from respiratory cultures    -Patient transitioned to comfort care today    Patient  discussed with attending Dr. Emilio Chávez MD  PGY-4 General Surgery        ==============================================================================  CHIEF COMPLAINT / OVERNIGHT EVENTS:   No acute events overnight.    MEDICAL HISTORY / ROS:  Admission history and ROS reviewed. Pertinent changes as follows:  None    PHYSICAL EXAM:  Heart Rate:  []   Temp:  [36.5 °C (97.7 °F)-36.8 °C (98.2 °F)]   Resp:  [16-35]   BP: (115-116)/(40-41)   SpO2:  [95 %-100 %]   Vent Mode: Volume control/assist control  FiO2 (%):  [40 %] 40 %  S RR:  [14] 14  S VT:  [500 mL] 500 mL  PEEP/CPAP (cm H2O):  [5 cm H20] 5 cm H20  MAP (cm H2O):  [11-18] 15    Physical Exam  Constitutional:       Comments: Sedated   Eyes:      Pupils: Pupils are equal, round, and reactive to light.   Cardiovascular:      Rate and Rhythm: Normal rate and regular rhythm.   Pulmonary:      Effort: No respiratory distress.      Comments: Intubated  Abdominal:      General: There is no distension.      Palpations: Abdomen is soft.      Tenderness: There is no abdominal tenderness.   Skin:     General: Skin is warm and dry.   Neurological:      Comments: Spontaneously moves on right        IMAGING SUMMARY:    Chest x-ray 9/10: ETT in place, stable basilar atelectasis    LABS:  Results from last 7 days   Lab Units 09/11/24  0130 09/10/24  0027 09/09/24  0133 09/07/24  1233 09/07/24  0208 09/06/24  1807 09/06/24  1356 09/06/24  0446   WBC AUTO x10*3/uL 12.4* 12.5* 12.0*   < > 10.2 10.5 11.2 11.4*   HEMOGLOBIN g/dL 7.1* 7.4* 7.7*   < > 7.1* 7.0* 6.6* 6.2*   HEMATOCRIT % 21.7* 22.7* 23.7*   < > 22.1* 21.1* 19.3* 18.5*   PLATELETS AUTO x10*3/uL 232 240 217   < > 212 215 236 251   NEUTROS PCT AUTO %  --   --   --   --   --  72.0 72.7 75.0   LYMPHO PCT MAN %  --   --   --   --  5.1  --   --   --    LYMPHS PCT AUTO %  --   --   --   --   --  10.1 9.9 8.0   MONO PCT MAN %  --   --   --   --  6.0  --   --   --    MONOS PCT AUTO %  --   --   --   --    --  8.4 8.3 7.7   EOSINO PCT MAN %  --   --   --   --  1.7  --   --   --    EOS PCT AUTO %  --   --   --   --   --  3.6 3.8 3.5    < > = values in this interval not displayed.           Results from last 7 days   Lab Units 09/11/24  0130 09/10/24  0027 09/09/24 0133 09/08/24  0302   SODIUM mmol/L 139 139 138 138   POTASSIUM mmol/L 5.3 4.9 4.3 4.0   CHLORIDE mmol/L 111* 114* 111* 110*   CO2 mmol/L 19* 21 21 20*   BUN mg/dL 86* 73* 64* 61*   CREATININE mg/dL 3.60* 2.90* 2.27* 1.92*   CALCIUM mg/dL 8.4* 8.2* 8.1* 7.8*   PROTEIN TOTAL g/dL  --  5.0* 5.2* 5.1*   BILIRUBIN TOTAL mg/dL  --  0.7 0.9 0.9   ALK PHOS U/L  --  69 69 65   ALT U/L  --  27 31 35   AST U/L  --  29 30 45*   GLUCOSE mg/dL 189* 221* 225* 185*     Results from last 7 days   Lab Units 09/10/24  0027 09/09/24  0133 09/08/24  0302   BILIRUBIN TOTAL mg/dL 0.7 0.9 0.9   BILIRUBIN DIRECT mg/dL 0.3 0.2 0.2       Results from last 7 days   Lab Units 09/11/24  0131 09/10/24  0027 09/09/24  0356   POCT PH, ARTERIAL pH 7.34* 7.34* 7.39   POCT PCO2, ARTERIAL mm Hg 36* 37* 30*   POCT PO2, ARTERIAL mm Hg 137* 149* 140*   POCT HCO3 CALCULATED, ARTERIAL mmol/L 19.4* 20.0* 18.2*   POCT BASE EXCESS, ARTERIAL mmol/L -5.8* -5.3* -6.0*       I have reviewed all medications, laboratory results, and imaging pertinent for today's encounter.

## 2024-09-13 ENCOUNTER — APPOINTMENT (OUTPATIENT)
Dept: RADIOLOGY | Facility: CLINIC | Age: 79
End: 2024-09-13
Payer: MEDICARE

## 2024-09-13 LAB
COMPONENT CODE: NORMAL
UNIT NUMBER: NORMAL

## 2024-09-13 NOTE — DISCHARGE SUMMARY
Discharge Diagnosis  Subdural hematoma (Multi)    Issues Requiring Follow-Up  N/a    Test Results Pending At Discharge  Pending Labs       Order Current Status    Path Review-Immunohematology In process    Urticaria Transfusion Reaction Evaluation In process            Hospital Course  79 yo male presented to  General ED after fall backwards from a chair. + Headstrike Unknown LOC. Altered and combative, intubated to protect airway. Transferred via EMS to [facility]. Pt underwent Right decompressive craniotomy for hematoma evacuation on 8/24, and was admitted to TICU. Pt remained intubated for poor GCS. Completed keppra seizure ppx. Pt did develop VAP on 8/30 for which antibiotics were initiated. BAL cultures grew E coli and GBS for which patient was maintained on cefepime.  Due to poor mental status, vEEG ordered which showed severe diffuse encephalopathy and right hemispheric structural lesion. No seizures seen. He also underwent MRI/MRV with no new findings. Neuroprognostication team was consulted on 9/7. Clarified that based on imaging and exam, the patient has some neurologic functional reserve; especially thalami and brainstem are largely reserved and pt is tracking. However, cognitive, motor, visual and sensory deficits can be expected, and recovery to pt's new baseline will likely take a considerable amount of time during which he might need an artifical airway and PEG.      Current status is due to both underlying structural abnormality (large SDH, midline shift, R frontal contusion) as well as metabolic (eg, DARIUSZ) and infectious illness (eg, pneumonia). From the neurologic perspective, his prognosis remains guarded and the chance of functional and meaningful neurologic recovery is, unfortunately, unlikely. Recommend ongoing GOC with family by primary team.     Ongoing goals of care with family. On 9/12, patient's family elected to pursue comfort care/palliative measures. Patient was compassionately extubated  "per wishes of the family. Comfort care protocols initiated. Time of death declared at 19:17 on 2024 by JANET Urias MD. Exam was documented in EMR. Patient's wife was notified of time death at 20:47 via phone discussion. Post-mortem measures implemented by nursing team. Patient transferred to [facility].    Pertinent Physical Exam At Time of Discharge  Physical Exam    Time of Death Note as documented by JANET Urias MD:  \"Decision was made by family to make patient comfort care.      Time of death:      There were no spontaneous respirations. No spontaneous movements. No palpable pulse. No breath sounds on exam. No heart sounds to auscultation. Pupils fixed and dilated. Asystole on monitor. Patient was pronounced .        Attending notified. Will continue to attempt to notify family.      Bulmaro Urias MD  PGY1 General Surgery\"         Home Medications     Medication List      ASK your doctor about these medications     allopurinol 100 mg tablet; Commonly known as: Zyloprim; TAKE 1 TABLET   TWICE A DAY   ammonium lactate 12 % lotion; Commonly known as: Lac-Hydrin   atorvastatin 40 mg tablet; Commonly known as: Lipitor   ciclopirox 8 % solution; Commonly known as: Penlac   clopidogrel 75 mg tablet; Commonly known as: Plavix   clotrimazole-betamethasone lotion; Commonly known as: Lotrisone; Apply   topically every 2 hours if needed (apply every 2 hours as needed if   itching persists).   famotidine 20 mg tablet; Commonly known as: Pepcid   fexofenadine 180 mg tablet; Commonly known as: Allegra   lidocaine 5 % patch; Commonly known as: Lidoderm; PLACE 1 PATCH OVER 12   HOURS ON THE SKIN ONCE DAILY. APPLY TO PAINFUL AREA 12 HOURS PER DAY,   REMOVE FOR 12 HOURS.   meclizine 25 mg tablet; Commonly known as: Antivert; TAKE 1 TABLET BY   MOUTH THREE TIMES A DAY AS NEEDED   metaxalone 800 mg tablet; Commonly known as: Skelaxin; TAKE 1 TABLET   DAILY   nitroglycerin 0.4 mg SL tablet; Commonly known as: Nitrostat   " omeprazole 20 mg DR capsule; Commonly known as: PriLOSEC; TAKE 1 CAPSULE   DAILY   oxyCODONE-acetaminophen 7.5-325 mg tablet; Commonly known as: Percocet   tamsulosin 0.4 mg 24 hr capsule; Commonly known as: Flomax   warfarin 5 mg tablet; Commonly known as: Coumadin; TAKE 1 TABLET (5 MG)   FIVE DAYS A WEEK AND TAKE ONE-HALF (1/2) TABLET (2.5 MG) TWO DAYS A WEEK       Outpatient Follow-Up  Future Appointments   Date Time Provider Department Center   9/16/2024  2:45 PM Zion Dhillon MD [id number] Amandeep Gresham, APRN-CNP  [facility]  #[id number]

## 2024-09-14 LAB
BLOOD EXPIRATION DATE: NORMAL
DISPENSE STATUS: NORMAL
PRODUCT BLOOD TYPE: 600
PRODUCT CODE: NORMAL
UNIT ABO: NORMAL
UNIT NUMBER: NORMAL
UNIT RH: NORMAL
UNIT VOLUME: 350
XM INTEP: NORMAL

## 2024-09-16 ENCOUNTER — APPOINTMENT (OUTPATIENT)
Dept: NEUROSURGERY | Facility: CLINIC | Age: 79
End: 2024-09-16
Payer: MEDICARE

## 2024-10-04 LAB — PATH REV-IMMUNOHEMATOLOGY-PR30: NORMAL

## (undated) DEVICE — MANIFOLD, 4 PORT NEPTUNE STANDARD

## (undated) DEVICE — BUR, ACORN 9MM PRECISION

## (undated) DEVICE — COVER, TABLE, UHC

## (undated) DEVICE — DRAINAGE, MONITORING SYSTEM, EXTERNAL CSF

## (undated) DEVICE — SPONGE, GAUZE, XRAY DECT, 16 PLY, 4 X 4, W/MASTER DMT,STERILE

## (undated) DEVICE — SPONGE, HEMOSTATIC, GELATIN, SURGIFOAM, 8 X 12.5 CM X 10 MM

## (undated) DEVICE — ADMINISTRATION SET, VENTED, FLASHBALL, 109 IN

## (undated) DEVICE — TUBING, SUCTION, CONNECTING, STERILE 0.25 X 120 IN., LF

## (undated) DEVICE — GOWN, SURGICAL, SMARTGOWN, XX-LARGE, STERILE

## (undated) DEVICE — CATHETER, EMBOLECTOMY, 4F X 80CM, SYNTEL, SILICONE

## (undated) DEVICE — Device

## (undated) DEVICE — PIN, SKULL, MAYFIELD, ADULT

## (undated) DEVICE — SPONGE, HEMOSTATIC, CELLULOSE, SURGICEL, FIBRILLAR, 2 X 4 IN

## (undated) DEVICE — DRAPE, CRANIOTOMY

## (undated) DEVICE — GOWN, SURGICAL, SMARTGOWN, XLARGE, STERILE

## (undated) DEVICE — SEALANT, HEMOSTATIC, FLOSEAL, 10 ML

## (undated) DEVICE — SUTURE, NUROLON, 4-0, 18 IN, TF, CONTROL RELEASE, MULTIPACK, BLACK

## (undated) DEVICE — DRESSING, MEPILEX, BORDER, SACRUM, 8.7 X 9.8 IN

## (undated) DEVICE — CATHETER TRAY, SURESTEP, 16FR, URINE METER W/STATLOCK

## (undated) DEVICE — SYRINGE, LUER LOCK, 12ML

## (undated) DEVICE — APPLICATOR, PREP, CHLORAPREP, W/ORANGE TINT, 10.5ML

## (undated) DEVICE — BAG, DECANTER

## (undated) DEVICE — SPHERE, STEALTHSTATION, 5-PK

## (undated) DEVICE — DRAIN, WOUND, FLAT, HUBLESS, FULL LENGTH PERFORATION, 7 MM X 20 CM

## (undated) DEVICE — PAD, GROUNDING, ELECTROSURGICAL, W/9 FT CABLE, POLYHESIVE II, ADULT, LF

## (undated) DEVICE — EXTENSION SET W/MALE LUER LOCK ADAPTER, VOLUME 2.4ML

## (undated) DEVICE — DRESSING, MEPILEX, BORDER, HEEL, 8.7 X 9.1 IN

## (undated) DEVICE — CATHETER, EMBOLECTOMY, 3F X 80CM, SYNTEL, SILICONE

## (undated) DEVICE — EVACUATOR, WOUND, SUCTION, CLOSED, JACKSON-PRATT, 100 CC, SILICONE

## (undated) DEVICE — DRESSING, NON-ADHERENT, TELFA, OUCHLESS, 3 X 8 IN, STERILE

## (undated) DEVICE — BAG, PLASTIC, 10 X 17 IN

## (undated) DEVICE — DRESSING, GAUZE, PETROLATUM, PATCH, XEROFORM, 1 X 8 IN, STERILE

## (undated) DEVICE — BUR, PERFORATOR, CRANIAL, ACRA-CUT 14/11MM, CDM

## (undated) DEVICE — SPONGE, LAP, XRAY DECT, 12IN X 12IN, W/MASTER DMT, STERILE

## (undated) DEVICE — CATHETER KIT, NEURO VENTRICULAR, 35 CML

## (undated) DEVICE — GOWN, SURGICAL, SMARTGOWN, LARGE, STERILE

## (undated) DEVICE — TUBING, SMOKE EVAC, 3/8 X 10 FT

## (undated) DEVICE — SYRINGE, 35 CC, LUER LOCK, MONOJECT, W/O CAP, LF

## (undated) DEVICE — REST, HEAD, BAGEL, 9 IN

## (undated) DEVICE — SUTURE, MONOCRYL, 4-0, 18 IN, PS2, UNDYED

## (undated) DEVICE — COVER, CART, 45 X 27 X 48 IN, CLEAR